# Patient Record
Sex: MALE | Race: WHITE | NOT HISPANIC OR LATINO | Employment: OTHER | URBAN - METROPOLITAN AREA
[De-identification: names, ages, dates, MRNs, and addresses within clinical notes are randomized per-mention and may not be internally consistent; named-entity substitution may affect disease eponyms.]

---

## 2017-02-24 ENCOUNTER — DOCUMENTATION ONLY (OUTPATIENT)
Dept: INTERNAL MEDICINE | Facility: CLINIC | Age: 72
End: 2017-02-24
Payer: MEDICARE

## 2017-02-24 DIAGNOSIS — I10 HYPERTENSION, BENIGN: ICD-10-CM

## 2017-02-24 DIAGNOSIS — E78.00 HIGH CHOLESTEROL: ICD-10-CM

## 2017-02-24 DIAGNOSIS — Z78.9 KNOWN MEDICAL PROBLEMS: Primary | ICD-10-CM

## 2017-02-24 PROCEDURE — 99490 CHRNC CARE MGMT STAFF 1ST 20: CPT | Mod: S$GLB,,, | Performed by: INTERNAL MEDICINE

## 2017-02-24 NOTE — PROGRESS NOTES
The patient's electronic chart was reviewed during this month. The patient's medical, functional, and psychosocial needs were assessed. Need for Home health care, PT, OT, , psychiatric care, and hospice was assessed. All preventative care measures were reviewed and updated. All medications were reviewed and reconciled. Potential drug interactions and medication adherence was reviewed. Prescriptions were renewed as appropriate. Education was provided to the patient and/or caregiver as needed, and all questions were answered. Over 20 minutes were spent providing these non-face-to-face services during this calendar month.    Refilled Simva.

## 2017-03-01 ENCOUNTER — DOCUMENTATION ONLY (OUTPATIENT)
Dept: INTERNAL MEDICINE | Facility: CLINIC | Age: 72
End: 2017-03-01
Payer: MEDICARE

## 2017-03-01 DIAGNOSIS — E87.6 LOW BLOOD POTASSIUM: ICD-10-CM

## 2017-03-01 DIAGNOSIS — E78.5 OTHER AND UNSPECIFIED HYPERLIPIDEMIA: Primary | ICD-10-CM

## 2017-03-01 DIAGNOSIS — Z78.9 KNOWN MEDICAL PROBLEMS: ICD-10-CM

## 2017-03-01 DIAGNOSIS — I10 UNSPECIFIED ESSENTIAL HYPERTENSION: ICD-10-CM

## 2017-03-01 DIAGNOSIS — E87.6 HYPOPOTASSEMIA: ICD-10-CM

## 2017-03-01 PROCEDURE — 99490 CHRNC CARE MGMT STAFF 1ST 20: CPT | Mod: S$GLB,,, | Performed by: INTERNAL MEDICINE

## 2017-03-01 RX ORDER — POTASSIUM CITRATE 10 MEQ/1
10 TABLET, EXTENDED RELEASE ORAL
Qty: 90 TABLET | Refills: 0 | Status: SHIPPED | OUTPATIENT
Start: 2017-03-01 | End: 2017-03-28 | Stop reason: SDUPTHER

## 2017-03-01 NOTE — PROGRESS NOTES
The patient's electronic chart was reviewed during this month. The patient's medical, functional, and psychosocial needs were assessed. Need for Home health care, PT, OT, , psychiatric care, and hospice was assessed. All preventative care measures were reviewed and updated. All medications were reviewed and reconciled. Potential drug interactions and medication adherence was reviewed. Prescriptions were renewed as appropriate. Education was provided to the patient and/or caregiver as needed, and all questions were answered. Over 20 minutes were spent providing these non-face-to-face services during this calendar month.     Refilled Potassium Citrate

## 2017-03-28 DIAGNOSIS — E87.6 HYPOPOTASSEMIA: ICD-10-CM

## 2017-03-28 RX ORDER — POTASSIUM CITRATE 10 MEQ/1
10 TABLET, EXTENDED RELEASE ORAL
Qty: 90 TABLET | Refills: 0 | Status: SHIPPED | OUTPATIENT
Start: 2017-03-28 | End: 2017-04-24 | Stop reason: SDUPTHER

## 2017-04-24 DIAGNOSIS — E87.6 HYPOPOTASSEMIA: ICD-10-CM

## 2017-04-24 RX ORDER — POTASSIUM CITRATE 10 MEQ/1
10 TABLET, EXTENDED RELEASE ORAL
Qty: 90 TABLET | Refills: 1 | Status: SHIPPED | OUTPATIENT
Start: 2017-04-24 | End: 2017-06-22 | Stop reason: SDUPTHER

## 2017-05-02 ENCOUNTER — OFFICE VISIT (OUTPATIENT)
Dept: INTERNAL MEDICINE | Facility: CLINIC | Age: 72
End: 2017-05-02
Attending: INTERNAL MEDICINE
Payer: MEDICARE

## 2017-05-02 VITALS
OXYGEN SATURATION: 97 % | HEART RATE: 77 BPM | WEIGHT: 269 LBS | DIASTOLIC BLOOD PRESSURE: 72 MMHG | BODY MASS INDEX: 35.65 KG/M2 | SYSTOLIC BLOOD PRESSURE: 130 MMHG | HEIGHT: 73 IN

## 2017-05-02 DIAGNOSIS — E78.00 HIGH CHOLESTEROL: ICD-10-CM

## 2017-05-02 DIAGNOSIS — E03.9 HYPOTHYROIDISM, UNSPECIFIED TYPE: ICD-10-CM

## 2017-05-02 DIAGNOSIS — E78.9 DISORDER OF LIPID METABOLISM: ICD-10-CM

## 2017-05-02 DIAGNOSIS — I10 HYPERTENSION, BENIGN: ICD-10-CM

## 2017-05-02 DIAGNOSIS — Z00.00 ROUTINE ADULT HEALTH MAINTENANCE: ICD-10-CM

## 2017-05-02 DIAGNOSIS — N20.1 URETERAL STONE: ICD-10-CM

## 2017-05-02 DIAGNOSIS — E55.9 VITAMIN D DEFICIENCY: ICD-10-CM

## 2017-05-02 DIAGNOSIS — M1A.09X0 IDIOPATHIC CHRONIC GOUT OF MULTIPLE SITES WITHOUT TOPHUS: Primary | ICD-10-CM

## 2017-05-02 DIAGNOSIS — R79.89 OTHER ABNORMAL BLOOD CHEMISTRY: ICD-10-CM

## 2017-05-02 DIAGNOSIS — I49.3 PVC (PREMATURE VENTRICULAR CONTRACTION): ICD-10-CM

## 2017-05-02 DIAGNOSIS — E11.9 TYPE 2 DIABETES MELLITUS WITHOUT COMPLICATION, WITHOUT LONG-TERM CURRENT USE OF INSULIN: Primary | ICD-10-CM

## 2017-05-02 DIAGNOSIS — D50.9 IRON DEFICIENCY ANEMIA, UNSPECIFIED IRON DEFICIENCY ANEMIA TYPE: ICD-10-CM

## 2017-05-02 DIAGNOSIS — Z12.5 SCREENING FOR PROSTATE CANCER: ICD-10-CM

## 2017-05-02 DIAGNOSIS — D51.0 PERNICIOUS ANEMIA: ICD-10-CM

## 2017-05-02 PROCEDURE — 1090F PRES/ABSN URINE INCON ASSESS: CPT | Mod: S$GLB,,, | Performed by: INTERNAL MEDICINE

## 2017-05-02 PROCEDURE — 3017F COLORECTAL CA SCREEN DOC REV: CPT | Mod: S$GLB,,, | Performed by: INTERNAL MEDICINE

## 2017-05-02 PROCEDURE — G0439 PPPS, SUBSEQ VISIT: HCPCS | Mod: S$GLB,,, | Performed by: INTERNAL MEDICINE

## 2017-05-02 PROCEDURE — G0444 DEPRESSION SCREEN ANNUAL: HCPCS | Mod: S$GLB,,, | Performed by: INTERNAL MEDICINE

## 2017-05-02 PROCEDURE — 3288F FALL RISK ASSESSMENT DOCD: CPT | Mod: S$GLB,,, | Performed by: INTERNAL MEDICINE

## 2017-05-02 PROCEDURE — 0521F PLAN OF CARE 4 PAIN DOCD: CPT | Mod: S$GLB,,, | Performed by: INTERNAL MEDICINE

## 2017-05-02 PROCEDURE — 3008F BODY MASS INDEX DOCD: CPT | Mod: S$GLB,,, | Performed by: INTERNAL MEDICINE

## 2017-05-02 PROCEDURE — 1170F FXNL STATUS ASSESSED: CPT | Mod: S$GLB,,, | Performed by: INTERNAL MEDICINE

## 2017-05-02 PROCEDURE — 1101F PT FALLS ASSESS-DOCD LE1/YR: CPT | Mod: S$GLB,,, | Performed by: INTERNAL MEDICINE

## 2017-05-02 PROCEDURE — G0447 BEHAVIOR COUNSEL OBESITY 15M: HCPCS | Mod: S$GLB,,, | Performed by: INTERNAL MEDICINE

## 2017-05-02 PROCEDURE — G0009 ADMIN PNEUMOCOCCAL VACCINE: HCPCS | Mod: S$GLB,,, | Performed by: INTERNAL MEDICINE

## 2017-05-02 PROCEDURE — 90670 PCV13 VACCINE IM: CPT | Mod: S$GLB,,, | Performed by: INTERNAL MEDICINE

## 2017-05-02 PROCEDURE — 3066F NEPHROPATHY DOC TX: CPT | Mod: S$GLB,,, | Performed by: INTERNAL MEDICINE

## 2017-05-02 PROCEDURE — G0442 ANNUAL ALCOHOL SCREEN 15 MIN: HCPCS | Mod: S$GLB,,, | Performed by: INTERNAL MEDICINE

## 2017-05-02 PROCEDURE — 3078F DIAST BP <80 MM HG: CPT | Mod: S$GLB,,, | Performed by: INTERNAL MEDICINE

## 2017-05-02 PROCEDURE — 3075F SYST BP GE 130 - 139MM HG: CPT | Mod: S$GLB,,, | Performed by: INTERNAL MEDICINE

## 2017-05-02 RX ORDER — COLCHICINE 0.6 MG/1
0.6 TABLET ORAL 2 TIMES DAILY PRN
Qty: 60 TABLET | Refills: 0 | Status: SHIPPED | OUTPATIENT
Start: 2017-05-02 | End: 2017-05-08 | Stop reason: SDUPTHER

## 2017-05-02 RX ORDER — INDOMETHACIN 25 MG/1
25 CAPSULE ORAL 2 TIMES DAILY PRN
Qty: 60 CAPSULE | Refills: 0 | Status: SHIPPED | OUTPATIENT
Start: 2017-05-02 | End: 2020-03-10

## 2017-05-02 NOTE — MR AVS SNAPSHOT
[facility]  [address] [facility], [facility] [address] [place] [id number]  Phone: [phone]  Fax: [phone]                  Av Dorantes   2017 8:45 AM   Office Visit    Description:  Male : 1945   Provider:  FORTINO Larry MD   Department:  [facility]           Reason for Visit     Follow-up     Gout           Diagnoses this Visit        Comments    Idiopathic chronic gout of multiple sites without tophus    -  Primary     High cholesterol         Hypertension, benign         PVC (premature ventricular contraction)         Ureteral stone         Routine adult health maintenance                To Do List           Future Appointments        Provider Department Dept Phone    2017 9:00 AM MD [facility]ia [phone]      Goals (5 Years of Data)     None      Follow-Up and Disposition     Return in about 6 months (around 2017).       These Medications        Disp Refills Start End    indomethacin (INDOCIN) 25 MG capsule 60 capsule 0 2017     Take 1 capsule (25 mg total) by mouth 2 (two) times daily as needed. - Oral    Pharmacy: [facility]/pharmacy #[id number] [address] Ph #: [phone]       colchicine (COLCRYS) 0.6 mg tablet 60 tablet 0 2017    Take 1 tablet (0.6 mg total) by mouth 2 (two) times daily as needed. - Oral    Pharmacy: [facility]/pharmacy #[id number] [address] Ph #: [phone]         [facility]     [facility] On [facility] Nurse Care Line -  Assistance  Unless otherwise directed by your provider, please contact [facility], our nurse care line that is available for  assistance.     Registered nurses in the [facility] provide: appointment scheduling, clinical advisement, health education, and other advisory services.  Call: [phone] (toll free)               Medications           START taking these NEW medications        Refills    indomethacin (INDOCIN) 25 MG capsule 0    Sig: Take 1  "capsule (25 mg total) by mouth 2 (two) times daily as needed.    Class: Normal    Route: Oral    colchicine (COLCRYS) 0.6 mg tablet 0    Sig: Take 1 tablet (0.6 mg total) by mouth 2 (two) times daily as needed.    Class: Normal    Route: Oral           Verify that the below list of medications is an accurate representation of the medications you are currently taking.  If none reported, the list may be blank. If incorrect, please contact your healthcare provider. Carry this list with you in case of emergency.           Current Medications     allopurinol (ZYLOPRIM) 300 MG tablet TAKE 1 TABLET DAILY    aspirin 162 MG TbEC Take 162 mg by mouth once daily.      cholecalciferol, vitamin D3, (VITAMIN D3) 1,000 unit capsule Take 1,000 Units by mouth once daily.    colchicine (COLCRYS) 0.6 mg tablet Take 1 tablet (0.6 mg total) by mouth 2 (two) times daily as needed.    indomethacin (INDOCIN) 25 MG capsule Take 1 capsule (25 mg total) by mouth 2 (two) times daily as needed.    irbesartan (AVAPRO) 300 MG tablet TAKE 1 TABLET DAILY    potassium citrate (UROCIT-K) 10 mEq (1,080 mg) TbSR Take 1 tablet (10 mEq total) by mouth 3 (three) times daily with meals.    simvastatin (ZOCOR) 80 MG tablet TAKE 1 TABLET DAILY           Clinical Reference Information           Your Vitals Were     BP Pulse Height Weight SpO2 BMI    130/72 77 6' 1" (1.854 m) 122 kg (269 lb) 97% 35.49 kg/m2      Blood Pressure          Most Recent Value    BP  130/72      Allergies as of 5/2/2017     No Known Allergies      Immunizations Administered on Date of Encounter - 5/2/2017     Name Date Dose VIS Date Route    Pneumococcal Conjugate - 13 Valent  Incomplete 0.5 mL 11/5/2015 Intramuscular      Orders Placed During Today's Visit      Normal Orders This Visit    Pneumococcal Conjugate Vaccine (13 Valent) (IM)       Instructions    Coordinated Plan of Care - 2017        You have been identified as having 2 or more chronic medical problems.  The goal of this " "plan is to minimize the impact of these medical problems on your health and lifespan.      Regaurdless of your medical problems there are 7 things you can do to likely make you live longer.  They are:  1. Do not smoke or quit smoking.   Quit smoking aids (gum, lozenges, and medications) are paid for by registering with the [place] [employer] at   Local - [phone]  Toll Free - [phone]  Web - service@smokingcessationtrust.org    2. Maintain a Body Mass Index < 27. BMI = Your weight in Pounds / (Your Height in Inches)2 × 703.  Examples: 5'0" < 138 lbs., 5'4" < 157 lbs, 5'8" < 177 lbs., 6' < 199 lbs.  (Add about 5 lbs. per additional inch).    3. Engage in regular physical activity.   Exercise aerobically with a target heart rate of (220-age) x 0.8  Exercise 30-45 minutes on most days of the week.  If you are out of shape you may have to start with 5-10 minutes per day and slowly increase your time.    4. Eat a healthy diet.  Fat intake: Not to exceed 30% of total daily calories.  Salt: 1.2 - 3 grams of Sodium each day.  (If you have high blood pressure it should be < 1.2 grams each day).  Supplements  All supplements can be obtained through a varied, healthy diet.   Calcium: 1,000 - 1,200 mg each day.  (1,500 mg each day if you have osteoporosis).   8 oz milk or Calcium fortified O.J. = 300 mg,  1oz of cheese = 100-200 mg  Vitamin D: 1,000 iu each day- Can probably be obtained by 30 min. of direct sunlight each day       3 oz. Kmibhw=242 iu,  3 oz. Tuna =150 iu, Milk or fortified O.J. = 120 iu  Fish oil: 1-2 grams each day or about 840 mg of EPA and DHA (Omega-3 fatty acids) each day       3 oz. Rancocas=2 grams,  3 oz. Tuna=1.3 grams,  3 oz. drained light Tuna= 0.25 grams    5. Maintain a normal cholesterol.  Your Bad cholesterol (LDL) should be < 130.  (LDL < 100 if you have heart artery disease, a stroke, vascular disease, or diabetes).    6. Maintain a normal Blood Pressure.  BP < 150/90 if over 60 " years old.  BP < 140/90 if age under 60, if you have diabetes, or kidney disease.    7. Maintain a normal Glucose (blood sugar).  Fasting Glucose < 100.  Hgb A1c < 6.0.  If you have diabetes a Hgb A1C < 7.0.  If you have diabetes and are older than 65 a Hgb A1C < 8.0.        Other Lifestyle guidelines  Alcohol: 1 drink = 12 oz. domestic beer, 4 oz. wine, or 1 oz. hard (80 proof) liquor             Males: </= 14 drinks per week with no more than 4 in any one day             Females: </= 7 drinks per week with no more than 3 in any one day    Depression: If you feel depressed discuss with your doctor.    Routine tests  You should be see by your Primary Care Doctor at least every 6 months.  Blood pressure check at each visit.  Cholesterol check every year.  Glucose check every year. Every 3-6 months if you have diabetes.  TSH (thyroid screen) every 2 years. Every 6-12 months if you take thyroid medicine.  Colonoscopy at age 50, and repeat every 10 years. until age 75.  Vision screen at age 65. Annual dilated retinal exam if you have diabetes.  Females - Mammogram every 1-2 years from age 40-75.                   Bone density scan at about age 65.  Males: Consider PSA screening annually at age 50, age 45 for  Americans, up to age 75.    Immunizations  Influenza vaccine every year in the fall.  Tetnus/Diptheria/Pertusis(Tdap) vaccine once, then Tetnus/Diptheria(Td) vaccine every 10 years.  Shingles vaccine at age 60.  Pneumonia vaccine at age 65. 2nd Pneumonia vaccine at least 1 year later (1st should be Prevnar-13 and 2nd should be Pneumovax-23).                 Language Assistance Services     ATTENTION: Language assistance services are available, free of charge. Please call [phone].      ATENCIÓN: Si habla chris, tiene a bolivar disposición servicios gratuitos de asistencia lingüística. Llame al [phone].     [facility] Ý: N?u b?n nói Ti?ng Vi?t, có các d?ch v? h? tr? ngôn ng? mi?n phí dành cho b?n. G?i s?  [phone].         [facility] complies with applicable Federal civil rights laws and does not discriminate on the basis of race, color, national origin, age, disability, or sex.

## 2017-05-02 NOTE — PATIENT INSTRUCTIONS
"Coordinated Plan of Care - 2017        You have been identified as having 2 or more chronic medical problems.  The goal of this plan is to minimize the impact of these medical problems on your health and lifespan.      Regaurdless of your medical problems there are 7 things you can do to likely make you live longer.  They are:  1. Do not smoke or quit smoking.   Quit smoking aids (gum, lozenges, and medications) are paid for by registering with the [place] [employer] at   [facility] - [phone]  Toll Free - [phone]  Web - service@smokingcessationtrust.org    2. Maintain a Body Mass Index < 27. BMI = Your weight in Pounds / (Your Height in Inches)2 × 703.  Examples: 5'0" < 138 lbs., 5'4" < 157 lbs, 5'8" < 177 lbs., 6' < 199 lbs.  (Add about 5 lbs. per additional inch).    3. Engage in regular physical activity.   Exercise aerobically with a target heart rate of (220-age) x 0.8  Exercise 30-45 minutes on most days of the week.  If you are out of shape you may have to start with 5-10 minutes per day and slowly increase your time.    4. Eat a healthy diet.  Fat intake: Not to exceed 30% of total daily calories.  Salt: 1.2 - 3 grams of Sodium each day.  (If you have high blood pressure it should be < 1.2 grams each day).  Supplements  All supplements can be obtained through a varied, healthy diet.   Calcium: 1,000 - 1,200 mg each day.  (1,500 mg each day if you have osteoporosis).   8 oz milk or Calcium fortified O.J. = 300 mg,  1oz of cheese = 100-200 mg  Vitamin D: 1,000 iu each day- Can probably be obtained by 30 min. of direct sunlight each day       3 oz. Nlzcop=046 iu,  3 oz. Tuna =150 iu, Milk or fortified O.J. = 120 iu  Fish oil: 1-2 grams each day or about 840 mg of EPA and DHA (Omega-3 fatty acids) each day       3 oz. Burns=2 grams,  3 oz. Tuna=1.3 grams,  3 oz. drained light Tuna= 0.25 grams    5. Maintain a normal cholesterol.  Your Bad cholesterol (LDL) should be < 130.  (LDL < 100 if you have " heart artery disease, a stroke, vascular disease, or diabetes).    6. Maintain a normal Blood Pressure.  BP < 150/90 if over 60 years old.  BP < 140/90 if age under 60, if you have diabetes, or kidney disease.    7. Maintain a normal Glucose (blood sugar).  Fasting Glucose < 100.  Hgb A1c < 6.0.  If you have diabetes a Hgb A1C < 7.0.  If you have diabetes and are older than 65 a Hgb A1C < 8.0.        Other Lifestyle guidelines  Alcohol: 1 drink = 12 oz. domestic beer, 4 oz. wine, or 1 oz. hard (80 proof) liquor             Males: </= 14 drinks per week with no more than 4 in any one day             Females: </= 7 drinks per week with no more than 3 in any one day    Depression: If you feel depressed discuss with your doctor.    Routine tests  You should be see by your Primary Care Doctor at least every 6 months.  Blood pressure check at each visit.  Cholesterol check every year.  Glucose check every year. Every 3-6 months if you have diabetes.  TSH (thyroid screen) every 2 years. Every 6-12 months if you take thyroid medicine.  Colonoscopy at age 50, and repeat every 10 years. until age 75.  Vision screen at age 65. Annual dilated retinal exam if you have diabetes.  Females - Mammogram every 1-2 years from age 40-75.                   Bone density scan at about age 65.  Males: Consider PSA screening annually at age 50, age 45 for  Americans, up to age 75.    Immunizations  Influenza vaccine every year in the fall.  Tetnus/Diptheria/Pertusis(Tdap) vaccine once, then Tetnus/Diptheria(Td) vaccine every 10 years.  Shingles vaccine at age 60.  Pneumonia vaccine at age 65. 2nd Pneumonia vaccine at least 1 year later (1st should be Prevnar-13 and 2nd should be Pneumovax-23).

## 2017-05-04 NOTE — PROGRESS NOTES
Subjective:       Patient ID: Av Dorantes is a 71 y.o. male.    Chief Complaint: Follow-up (6 month) and Gout (right foot)    HPI Comments: Lots of shellfish lately.  Arch of both feet hurt. Better with Colchicine and Indocin for a few days.    Annual Wellness Exam:    Cognitive Impairment: None    Mental Conditions: None    Depression Risk Factors: None    Functional Ability:    Steady gait: Yes  Self reliant: Yes   Safe home: Yes  Hearing Difficulties: No   Vision Difficulties: No    Physical Impairment: None    Living Will: See Patient Demographics    Functional assessment:  Able to do all ADL's  Fall Risk: Low  Urinary incontinence: No  Energy level: Good  Mental well-being: Good    HEIDS Measure screening dates:  BMI: 35.5      DEXA:               See Health Maintenance Report  colon screen:    See Health Maintenance Report      Mammogram:   See Health Maintenance Report                   Glaucoma screen:  per Optho                    Vaccines: See Health Maintenance Report  Flu:            Pneumovax:  Zostavax:       Pain management: Coch and Indocin prn gout pain.      The patient's current health status is: Good   Patient was educated on all medical problems. See A/P.                             Hypertension   This is a chronic problem. The current episode started more than 1 year ago. The problem is controlled.     Review of Systems   Constitutional: Negative.    Respiratory: Negative.    Cardiovascular: Negative.    Musculoskeletal: Positive for arthralgias.   Psychiatric/Behavioral: Negative for dysphoric mood.       Objective:      Physical Exam   Constitutional: He appears well-developed and well-nourished.   HENT:   Head: Normocephalic and atraumatic.   Eyes: Pupils are equal, round, and reactive to light.   Cardiovascular: Normal rate, regular rhythm and normal heart sounds.    Pulmonary/Chest: Effort normal.   Musculoskeletal: He exhibits edema.   1+ edema bilateral.   Neurological: He is alert.        Assessment:       1. Idiopathic chronic gout of multiple sites without tophus    2. High cholesterol    3. Hypertension, benign    4. PVC (premature ventricular contraction)    5. Ureteral stone    6. Routine adult health maintenance        Plan:       Per orders and D/C instructions.  Continue meds/diet for gout, Hx kidney stone, Hx PVCs, HTN, and high cholest. which are stable.  Low purine diet and cont Allopurinol for gout.    Screening: The patient was screened for depression with the PHQ2 questionnaire and possible health consequences were discussed with the patient, who understands (15 minutes spent). The patient was screened for the misuse of alcohol, by asking the number of drinks per average week, and if pt has had more than 4 drinks (more than 3 for women and elderly) in 1 day within the past year. The health and legal consequences of misuse were discussed (15 minutes spent). The patient was screened for obesity (BMI>30), If the current BMI > 30, then the possible consequences of obesity, as well as the benefits of diet, exercise, and weight loss were discussed (15 minutes spent).

## 2017-05-05 LAB
25(OH)D3+25(OH)D2 SERPL-MCNC: 36 NG/ML (ref 30–100)
ALBUMIN SERPL-MCNC: 4.5 G/DL (ref 3.6–5.1)
ALBUMIN/GLOB SERPL: 1.8 (CALC) (ref 1–2.5)
ALP SERPL-CCNC: 46 U/L (ref 40–115)
ALT SERPL-CCNC: 27 U/L (ref 9–46)
AST SERPL-CCNC: 22 U/L (ref 10–35)
BASOPHILS # BLD AUTO: 20 CELLS/UL (ref 0–200)
BASOPHILS NFR BLD AUTO: 0.4 %
BILIRUB SERPL-MCNC: 0.6 MG/DL (ref 0.2–1.2)
BUN SERPL-MCNC: 16 MG/DL (ref 7–25)
BUN/CREAT SERPL: ABNORMAL (CALC) (ref 6–22)
CALCIUM SERPL-MCNC: 9.8 MG/DL (ref 8.6–10.3)
CHLORIDE SERPL-SCNC: 104 MMOL/L (ref 98–110)
CHOLEST SERPL-MCNC: 195 MG/DL (ref 125–200)
CHOLEST/HDLC SERPL: 3.9 (CALC)
CK SERPL-CCNC: 267 U/L (ref 44–196)
CO2 SERPL-SCNC: 25 MMOL/L (ref 20–31)
CREAT SERPL-MCNC: 1 MG/DL (ref 0.7–1.18)
EOSINOPHIL # BLD AUTO: 87 CELLS/UL (ref 15–500)
EOSINOPHIL NFR BLD AUTO: 1.7 %
ERYTHROCYTE [DISTWIDTH] IN BLOOD BY AUTOMATED COUNT: 12.9 % (ref 11–15)
GFR SERPL CREATININE-BSD FRML MDRD: 74 ML/MIN/1.73M2
GLOBULIN SER CALC-MCNC: 2.5 G/DL (CALC) (ref 1.9–3.7)
GLUCOSE SERPL-MCNC: 101 MG/DL (ref 65–99)
HBA1C MFR BLD: 5.7 % OF TOTAL HGB
HCT VFR BLD AUTO: 42.6 % (ref 38.5–50)
HDLC SERPL-MCNC: 50 MG/DL
HGB BLD-MCNC: 14.5 G/DL (ref 13.2–17.1)
LDLC SERPL CALC-MCNC: 114 MG/DL (CALC)
LYMPHOCYTES # BLD AUTO: 1346 CELLS/UL (ref 850–3900)
LYMPHOCYTES NFR BLD AUTO: 26.4 %
MCH RBC QN AUTO: 32.8 PG (ref 27–33)
MCHC RBC AUTO-ENTMCNC: 34.1 G/DL (ref 32–36)
MCV RBC AUTO: 96.1 FL (ref 80–100)
MONOCYTES # BLD AUTO: 464 CELLS/UL (ref 200–950)
MONOCYTES NFR BLD AUTO: 9.1 %
NEUTROPHILS # BLD AUTO: 3182 CELLS/UL (ref 1500–7800)
NEUTROPHILS NFR BLD AUTO: 62.4 %
NONHDLC SERPL-MCNC: 145 MG/DL (CALC)
PLATELET # BLD AUTO: 150 THOUSAND/UL (ref 140–400)
PMV BLD REES-ECKER: 9 FL (ref 7.5–12.5)
POTASSIUM SERPL-SCNC: 4.5 MMOL/L (ref 3.5–5.3)
PROT SERPL-MCNC: 7 G/DL (ref 6.1–8.1)
PSA SERPL-MCNC: 1.2 NG/ML
RBC # BLD AUTO: 4.44 MILLION/UL (ref 4.2–5.8)
SODIUM SERPL-SCNC: 138 MMOL/L (ref 135–146)
TRIGL SERPL-MCNC: 153 MG/DL
TSH SERPL-ACNC: 2.48 MIU/L (ref 0.4–4.5)
URATE SERPL-MCNC: 4.5 MG/DL (ref 4–8)
VIT B12 SERPL-MCNC: 413 PG/ML (ref 200–1100)
VITAMIN D2 SERPL-MCNC: <4 NG/ML
VITAMIN D3 SERPL-MCNC: 36 NG/ML
WBC # BLD AUTO: 5.1 THOUSAND/UL (ref 3.8–10.8)

## 2017-05-08 ENCOUNTER — DOCUMENTATION ONLY (OUTPATIENT)
Dept: INTERNAL MEDICINE | Facility: CLINIC | Age: 72
End: 2017-05-08
Payer: MEDICARE

## 2017-05-08 DIAGNOSIS — I10 HYPERTENSION, BENIGN: Primary | ICD-10-CM

## 2017-05-08 DIAGNOSIS — E78.00 HIGH CHOLESTEROL: ICD-10-CM

## 2017-05-08 DIAGNOSIS — M10.9 GOUT, UNSPECIFIED CAUSE, UNSPECIFIED CHRONICITY, UNSPECIFIED SITE: Primary | ICD-10-CM

## 2017-05-08 DIAGNOSIS — Z78.9 KNOWN MEDICAL PROBLEMS: ICD-10-CM

## 2017-05-08 PROCEDURE — 99490 CHRNC CARE MGMT STAFF 1ST 20: CPT | Mod: S$GLB,,, | Performed by: INTERNAL MEDICINE

## 2017-05-08 RX ORDER — COLCHICINE 0.6 MG/1
0.6 TABLET ORAL 2 TIMES DAILY PRN
Qty: 60 TABLET | Refills: 2 | Status: SHIPPED | OUTPATIENT
Start: 2017-05-08 | End: 2018-05-18 | Stop reason: SDUPTHER

## 2017-05-08 RX ORDER — FEBUXOSTAT 80 MG/1
TABLET, FILM COATED ORAL
Qty: 30 TABLET | Refills: 2 | Status: SHIPPED | OUTPATIENT
Start: 2017-05-08 | End: 2017-08-11 | Stop reason: SDUPTHER

## 2017-05-19 NOTE — PROGRESS NOTES
Called in meds for gout flare up            The patient's electronic chart was reviewed during this month. The patient's medical, functional, and psychosocial needs were assessed. Need for Home health care, PT, OT, , psychiatric care, and hospice was assessed. All preventative care measures were reviewed and updated. All medications were reviewed and reconciled. Potential drug interactions and medication adherence was reviewed. Prescriptions were renewed as appropriate. Education was provided to the patient and/or caregiver. All questions were answered. Over 20 minutes were spent providing these non-face-to-face services during this calendar month.

## 2017-06-22 ENCOUNTER — DOCUMENTATION ONLY (OUTPATIENT)
Dept: INTERNAL MEDICINE | Facility: CLINIC | Age: 72
End: 2017-06-22
Payer: MEDICARE

## 2017-06-22 DIAGNOSIS — I10 HYPERTENSION, BENIGN: ICD-10-CM

## 2017-06-22 DIAGNOSIS — Z78.9 KNOWN MEDICAL PROBLEMS: ICD-10-CM

## 2017-06-22 DIAGNOSIS — E78.00 HIGH CHOLESTEROL: Primary | ICD-10-CM

## 2017-06-22 DIAGNOSIS — E87.6 HYPOPOTASSEMIA: ICD-10-CM

## 2017-06-22 PROCEDURE — 99490 CHRNC CARE MGMT STAFF 1ST 20: CPT | Mod: S$GLB,,, | Performed by: INTERNAL MEDICINE

## 2017-06-22 RX ORDER — POTASSIUM CITRATE 10 MEQ/1
10 TABLET, EXTENDED RELEASE ORAL
Qty: 90 TABLET | Refills: 1 | Status: SHIPPED | OUTPATIENT
Start: 2017-06-22 | End: 2017-12-04 | Stop reason: SDUPTHER

## 2017-06-22 NOTE — PROGRESS NOTES
Refilled potassium citrate and sent to [facility] on [address].          The patient's electronic chart was reviewed during this month. The patient's medical, functional, and psychosocial needs were assessed. Need for Home health care, PT, OT, , psychiatric care, and hospice was assessed. All preventative care measures were reviewed and updated. All medications were reviewed and reconciled. Potential drug interactions and medication adherence was reviewed. Prescriptions were renewed as appropriate. Education was provided to the patient and/or caregiver. All questions were answered. Over 20 minutes were spent providing these non-face-to-face services during this calendar month.

## 2017-06-30 DIAGNOSIS — I10 ESSENTIAL HYPERTENSION: Primary | ICD-10-CM

## 2017-06-30 RX ORDER — IRBESARTAN 300 MG/1
300 TABLET ORAL DAILY
Qty: 90 TABLET | Refills: 1 | Status: SHIPPED | OUTPATIENT
Start: 2017-06-30 | End: 2017-12-29 | Stop reason: SDUPTHER

## 2017-08-11 DIAGNOSIS — M10.9 GOUT, UNSPECIFIED CAUSE, UNSPECIFIED CHRONICITY, UNSPECIFIED SITE: ICD-10-CM

## 2017-08-11 RX ORDER — FEBUXOSTAT 80 MG/1
TABLET, FILM COATED ORAL
Qty: 30 TABLET | Refills: 2 | Status: SHIPPED | OUTPATIENT
Start: 2017-08-11 | End: 2017-11-06 | Stop reason: SDUPTHER

## 2017-09-11 DIAGNOSIS — Z29.89 ALTITUDE SICKNESS PREVENTATIVE MEASURES: Primary | ICD-10-CM

## 2017-09-11 RX ORDER — ACETAZOLAMIDE 250 MG/1
250 TABLET ORAL 2 TIMES DAILY
Qty: 30 TABLET | Refills: 0 | Status: SHIPPED | OUTPATIENT
Start: 2017-09-11 | End: 2017-12-05

## 2017-09-12 DIAGNOSIS — E78.00 HIGH CHOLESTEROL: Primary | ICD-10-CM

## 2017-09-12 RX ORDER — SIMVASTATIN 80 MG/1
80 TABLET, FILM COATED ORAL DAILY
Qty: 90 TABLET | Refills: 1 | Status: SHIPPED | OUTPATIENT
Start: 2017-09-12 | End: 2018-03-13 | Stop reason: SDUPTHER

## 2017-11-06 ENCOUNTER — DOCUMENTATION ONLY (OUTPATIENT)
Dept: INTERNAL MEDICINE | Facility: CLINIC | Age: 72
End: 2017-11-06
Payer: MEDICARE

## 2017-11-06 DIAGNOSIS — Z78.9 KNOWN MEDICAL PROBLEMS: ICD-10-CM

## 2017-11-06 DIAGNOSIS — I10 ESSENTIAL HYPERTENSION, BENIGN: Primary | ICD-10-CM

## 2017-11-06 DIAGNOSIS — E78.5 HYPERLIPIDEMIA, UNSPECIFIED HYPERLIPIDEMIA TYPE: ICD-10-CM

## 2017-11-06 DIAGNOSIS — M10.9 GOUT, UNSPECIFIED CAUSE, UNSPECIFIED CHRONICITY, UNSPECIFIED SITE: ICD-10-CM

## 2017-11-06 PROCEDURE — 99490 CHRNC CARE MGMT STAFF 1ST 20: CPT | Mod: S$GLB,,, | Performed by: INTERNAL MEDICINE

## 2017-11-06 RX ORDER — FEBUXOSTAT 80 MG/1
TABLET, FILM COATED ORAL
Qty: 30 TABLET | Refills: 2 | Status: SHIPPED | OUTPATIENT
Start: 2017-11-06 | End: 2018-03-01 | Stop reason: SDUPTHER

## 2017-11-06 NOTE — PROGRESS NOTES
The patient's electronic chart was reviewed during this month. The patient's medical, functional, and psychosocial needs were assessed. Need for Home health care, PT, OT, , psychiatric care, and hospice was assessed. All preventative care measures were reviewed and updated. All medications were reviewed and reconciled. Potential drug interactions and medication adherence was reviewed. Prescriptions were renewed as appropriate. Education was provided to the patient and/or caregiver as needed, and all questions were answered. Over 20 minutes were spent providing these non-face-to-face services during this calendar month.     Refilled Uloric  Updated LINKS registry

## 2017-12-04 ENCOUNTER — DOCUMENTATION ONLY (OUTPATIENT)
Dept: INTERNAL MEDICINE | Facility: CLINIC | Age: 72
End: 2017-12-04
Payer: MEDICARE

## 2017-12-04 DIAGNOSIS — E87.6 HYPOPOTASSEMIA: ICD-10-CM

## 2017-12-04 DIAGNOSIS — E78.00 HIGH CHOLESTEROL: Primary | ICD-10-CM

## 2017-12-04 DIAGNOSIS — I10 HYPERTENSION, BENIGN: ICD-10-CM

## 2017-12-04 DIAGNOSIS — Z78.9 KNOWN MEDICAL PROBLEMS: ICD-10-CM

## 2017-12-04 PROCEDURE — 99490 CHRNC CARE MGMT STAFF 1ST 20: CPT | Mod: S$GLB,,, | Performed by: INTERNAL MEDICINE

## 2017-12-04 RX ORDER — POTASSIUM CITRATE 10 MEQ/1
10 TABLET, EXTENDED RELEASE ORAL
Qty: 90 TABLET | Refills: 1 | Status: SHIPPED | OUTPATIENT
Start: 2017-12-04 | End: 2018-01-26 | Stop reason: SDUPTHER

## 2017-12-05 ENCOUNTER — OFFICE VISIT (OUTPATIENT)
Dept: INTERNAL MEDICINE | Facility: CLINIC | Age: 72
End: 2017-12-05
Attending: INTERNAL MEDICINE
Payer: MEDICARE

## 2017-12-05 VITALS
DIASTOLIC BLOOD PRESSURE: 78 MMHG | SYSTOLIC BLOOD PRESSURE: 120 MMHG | HEART RATE: 78 BPM | HEIGHT: 73 IN | WEIGHT: 266 LBS | OXYGEN SATURATION: 94 % | BODY MASS INDEX: 35.25 KG/M2

## 2017-12-05 DIAGNOSIS — M1A.09X0 IDIOPATHIC CHRONIC GOUT OF MULTIPLE SITES WITHOUT TOPHUS: ICD-10-CM

## 2017-12-05 DIAGNOSIS — K63.5 POLYP OF COLON, UNSPECIFIED PART OF COLON, UNSPECIFIED TYPE: ICD-10-CM

## 2017-12-05 DIAGNOSIS — I10 HYPERTENSION, BENIGN: ICD-10-CM

## 2017-12-05 DIAGNOSIS — E78.00 HIGH CHOLESTEROL: Primary | ICD-10-CM

## 2017-12-05 PROCEDURE — 90662 IIV NO PRSV INCREASED AG IM: CPT | Mod: S$GLB,,, | Performed by: INTERNAL MEDICINE

## 2017-12-05 PROCEDURE — G0008 ADMIN INFLUENZA VIRUS VAC: HCPCS | Mod: S$GLB,,, | Performed by: INTERNAL MEDICINE

## 2017-12-05 PROCEDURE — 99213 OFFICE O/P EST LOW 20 MIN: CPT | Mod: S$GLB,,, | Performed by: INTERNAL MEDICINE

## 2017-12-05 NOTE — PROGRESS NOTES
Subjective:       Patient ID: Av Dorantes is a 71 y.o. male.    Chief Complaint: Follow-up (6 month)    Hypertension   This is a chronic problem. The current episode started more than 1 year ago. The problem is controlled.     Review of Systems   Constitutional: Negative.    Respiratory: Negative.    Cardiovascular: Negative.        Objective:      Physical Exam   Constitutional: He appears well-developed and well-nourished.   HENT:   Head: Normocephalic and atraumatic.   Eyes: Pupils are equal, round, and reactive to light.   Cardiovascular: Normal rate, regular rhythm and normal heart sounds.    Pulmonary/Chest: Effort normal.   Musculoskeletal: He exhibits edema.   1+ edema bilateral.   Neurological: He is alert.       Assessment:       1. High cholesterol    2. Hypertension, benign    3. Polyp of colon, unspecified part of colon, unspecified type    4. Idiopathic chronic gout of multiple sites without tophus        Plan:       Per orders and D/C instructions.  Continue meds/diet for gout, HTN, and high cholest. which are stable.

## 2017-12-27 NOTE — PROGRESS NOTES
Sent script to pharmacy.          The patient's electronic chart was reviewed during this month. The patient's medical, functional, and psychosocial needs were assessed. Need for Home health care, PT, OT, , psychiatric care, and hospice was assessed. All preventative care measures were reviewed and updated. All medications were reviewed and reconciled. Potential drug interactions and medication adherence was reviewed. Prescriptions were renewed as appropriate. Education was provided to the patient and/or caregiver. All questions were answered. Over 20 minutes were spent providing these non-face-to-face services during this calendar month.

## 2017-12-29 DIAGNOSIS — I10 ESSENTIAL HYPERTENSION: ICD-10-CM

## 2017-12-29 RX ORDER — IRBESARTAN 300 MG/1
300 TABLET ORAL DAILY
Qty: 90 TABLET | Refills: 1 | Status: SHIPPED | OUTPATIENT
Start: 2017-12-29 | End: 2018-06-25 | Stop reason: SDUPTHER

## 2018-01-26 ENCOUNTER — DOCUMENTATION ONLY (OUTPATIENT)
Dept: INTERNAL MEDICINE | Facility: CLINIC | Age: 73
End: 2018-01-26
Payer: MEDICARE

## 2018-01-26 DIAGNOSIS — E78.00 HIGH CHOLESTEROL: Primary | ICD-10-CM

## 2018-01-26 DIAGNOSIS — I10 HYPERTENSION, BENIGN: ICD-10-CM

## 2018-01-26 DIAGNOSIS — Z78.9 KNOWN MEDICAL PROBLEMS: ICD-10-CM

## 2018-01-26 DIAGNOSIS — E87.6 HYPOPOTASSEMIA: ICD-10-CM

## 2018-01-26 PROCEDURE — 99490 CHRNC CARE MGMT STAFF 1ST 20: CPT | Mod: S$GLB,,, | Performed by: INTERNAL MEDICINE

## 2018-01-26 RX ORDER — POTASSIUM CITRATE 10 MEQ/1
10 TABLET, EXTENDED RELEASE ORAL
Qty: 90 TABLET | Refills: 1 | Status: SHIPPED | OUTPATIENT
Start: 2018-01-26 | End: 2018-04-02 | Stop reason: SDUPTHER

## 2018-01-26 NOTE — PROGRESS NOTES
The patient's electronic chart was reviewed during this month. The patient's medical, functional, and psychosocial needs were assessed. Need for Home health care, PT, OT, , psychiatric care, and hospice was assessed. All preventative care measures were reviewed and updated. All medications were reviewed and reconciled. Potential drug interactions and medication adherence was reviewed. Prescriptions were renewed as appropriate. Education was provided to the patient and/or caregiver as needed, and all questions were answered. Over 20 minutes were spent providing these non-face-to-face services during this calendar month.    Refilled Urocrit.

## 2018-03-01 RX ORDER — FEBUXOSTAT 80 MG/1
80 TABLET, FILM COATED ORAL DAILY
Qty: 90 TABLET | Refills: 0 | Status: SHIPPED | OUTPATIENT
Start: 2018-03-01 | End: 2018-07-24 | Stop reason: SDUPTHER

## 2018-03-12 ENCOUNTER — DOCUMENTATION ONLY (OUTPATIENT)
Dept: INTERNAL MEDICINE | Facility: CLINIC | Age: 73
End: 2018-03-12
Payer: MEDICARE

## 2018-03-12 DIAGNOSIS — E78.5 HYPERLIPIDEMIA, UNSPECIFIED HYPERLIPIDEMIA TYPE: Primary | ICD-10-CM

## 2018-03-12 DIAGNOSIS — Z78.9 KNOWN MEDICAL PROBLEMS: ICD-10-CM

## 2018-03-12 DIAGNOSIS — I10 ESSENTIAL HYPERTENSION, MALIGNANT: ICD-10-CM

## 2018-03-12 PROCEDURE — 99490 CHRNC CARE MGMT STAFF 1ST 20: CPT | Mod: S$GLB,,, | Performed by: INTERNAL MEDICINE

## 2018-03-13 RX ORDER — SIMVASTATIN 80 MG/1
80 TABLET, FILM COATED ORAL NIGHTLY
Qty: 90 TABLET | Refills: 0 | Status: SHIPPED | OUTPATIENT
Start: 2018-03-13 | End: 2018-06-11 | Stop reason: SDUPTHER

## 2018-04-02 ENCOUNTER — DOCUMENTATION ONLY (OUTPATIENT)
Dept: INTERNAL MEDICINE | Facility: CLINIC | Age: 73
End: 2018-04-02
Payer: MEDICARE

## 2018-04-02 DIAGNOSIS — E78.00 PURE HYPERCHOLESTEROLEMIA: Primary | ICD-10-CM

## 2018-04-02 DIAGNOSIS — I10 ESSENTIAL HYPERTENSION, MALIGNANT: ICD-10-CM

## 2018-04-02 DIAGNOSIS — Z78.9 KNOWN MEDICAL PROBLEMS: ICD-10-CM

## 2018-04-02 DIAGNOSIS — E87.6 HYPOPOTASSEMIA: ICD-10-CM

## 2018-04-02 PROCEDURE — 99490 CHRNC CARE MGMT STAFF 1ST 20: CPT | Mod: S$GLB,,, | Performed by: INTERNAL MEDICINE

## 2018-04-02 RX ORDER — POTASSIUM CITRATE 10 MEQ/1
10 TABLET, EXTENDED RELEASE ORAL
Qty: 90 TABLET | Refills: 1 | Status: SHIPPED | OUTPATIENT
Start: 2018-04-02 | End: 2018-05-07 | Stop reason: SDUPTHER

## 2018-04-30 NOTE — PROGRESS NOTES
The patient's electronic chart was reviewed during this month. The patient's medical, functional, and psychosocial needs were assessed. Need for Home health care, PT, OT, , psychiatric care, and hospice was assessed. All preventative care measures were reviewed and updated. All medications were reviewed and reconciled. Potential drug interactions and medication adherence was reviewed. Prescriptions were renewed as appropriate. Education was provided to the patient and/or caregiver as needed, and all questions were answered. Over 20 minutes were spent providing these non-face-to-face services during this calendar month.     Refilled Urocit  Received Colonoscopy report, reviewed and updated health maintenance.

## 2018-05-07 DIAGNOSIS — E87.6 HYPOPOTASSEMIA: ICD-10-CM

## 2018-05-07 RX ORDER — POTASSIUM CITRATE 10 MEQ/1
10 TABLET, EXTENDED RELEASE ORAL
Qty: 270 TABLET | Refills: 0 | Status: SHIPPED | OUTPATIENT
Start: 2018-05-07 | End: 2018-12-21 | Stop reason: SDUPTHER

## 2018-05-18 DIAGNOSIS — M10.9 GOUT, UNSPECIFIED CAUSE, UNSPECIFIED CHRONICITY, UNSPECIFIED SITE: ICD-10-CM

## 2018-05-18 RX ORDER — COLCHICINE 0.6 MG/1
0.6 TABLET ORAL 2 TIMES DAILY PRN
Qty: 60 TABLET | Refills: 2 | Status: SHIPPED | OUTPATIENT
Start: 2018-05-18 | End: 2018-07-24 | Stop reason: SDUPTHER

## 2018-06-11 RX ORDER — SIMVASTATIN 80 MG/1
80 TABLET, FILM COATED ORAL NIGHTLY
Qty: 90 TABLET | Refills: 0 | Status: SHIPPED | OUTPATIENT
Start: 2018-06-11 | End: 2018-10-05 | Stop reason: SDUPTHER

## 2018-06-25 DIAGNOSIS — I10 ESSENTIAL HYPERTENSION: ICD-10-CM

## 2018-06-25 RX ORDER — IRBESARTAN 300 MG/1
300 TABLET ORAL DAILY
Qty: 90 TABLET | Refills: 1 | Status: SHIPPED | OUTPATIENT
Start: 2018-06-25 | End: 2018-12-31 | Stop reason: SDUPTHER

## 2018-07-24 DIAGNOSIS — M10.9 GOUT, UNSPECIFIED CAUSE, UNSPECIFIED CHRONICITY, UNSPECIFIED SITE: ICD-10-CM

## 2018-07-24 RX ORDER — COLCHICINE 0.6 MG/1
0.6 TABLET ORAL 2 TIMES DAILY PRN
Qty: 60 TABLET | Refills: 2 | Status: SHIPPED | OUTPATIENT
Start: 2018-07-24 | End: 2018-09-04

## 2018-07-24 RX ORDER — FEBUXOSTAT 80 MG/1
80 TABLET, FILM COATED ORAL DAILY
Qty: 90 TABLET | Refills: 1 | Status: SHIPPED | OUTPATIENT
Start: 2018-07-24 | End: 2019-01-11 | Stop reason: SDUPTHER

## 2018-08-03 DIAGNOSIS — J32.9 SINUSITIS, UNSPECIFIED CHRONICITY, UNSPECIFIED LOCATION: Primary | ICD-10-CM

## 2018-08-03 RX ORDER — METHYLPREDNISOLONE 4 MG/1
TABLET ORAL
Qty: 1 PACKAGE | Refills: 0 | Status: SHIPPED | OUTPATIENT
Start: 2018-08-03 | End: 2018-09-04

## 2018-08-03 RX ORDER — DOXYCYCLINE HYCLATE 100 MG
100 TABLET ORAL 2 TIMES DAILY
Qty: 14 TABLET | Refills: 0 | Status: SHIPPED | OUTPATIENT
Start: 2018-08-03 | End: 2018-09-04

## 2018-09-04 ENCOUNTER — LAB VISIT (OUTPATIENT)
Dept: LAB | Facility: OTHER | Age: 73
End: 2018-09-04
Attending: INTERNAL MEDICINE
Payer: MEDICARE

## 2018-09-04 ENCOUNTER — OFFICE VISIT (OUTPATIENT)
Dept: INTERNAL MEDICINE | Facility: CLINIC | Age: 73
End: 2018-09-04
Attending: INTERNAL MEDICINE
Payer: MEDICARE

## 2018-09-04 VITALS
SYSTOLIC BLOOD PRESSURE: 118 MMHG | HEART RATE: 71 BPM | BODY MASS INDEX: 34.85 KG/M2 | DIASTOLIC BLOOD PRESSURE: 76 MMHG | HEIGHT: 73 IN | WEIGHT: 263 LBS | OXYGEN SATURATION: 97 %

## 2018-09-04 DIAGNOSIS — E55.9 VITAMIN D DEFICIENCY: ICD-10-CM

## 2018-09-04 DIAGNOSIS — Z00.00 ROUTINE ADULT HEALTH MAINTENANCE: ICD-10-CM

## 2018-09-04 DIAGNOSIS — R79.89 OTHER SPECIFIED ABNORMAL FINDINGS OF BLOOD CHEMISTRY: ICD-10-CM

## 2018-09-04 DIAGNOSIS — Z12.5 SCREENING FOR PROSTATE CANCER: ICD-10-CM

## 2018-09-04 DIAGNOSIS — Z13.89 SCREENING FOR OBESITY: ICD-10-CM

## 2018-09-04 DIAGNOSIS — D50.8 OTHER IRON DEFICIENCY ANEMIA: ICD-10-CM

## 2018-09-04 DIAGNOSIS — N20.1 URETERAL STONE: ICD-10-CM

## 2018-09-04 DIAGNOSIS — I10 ESSENTIAL HYPERTENSION: ICD-10-CM

## 2018-09-04 DIAGNOSIS — E78.9 DISORDER OF LIPID METABOLISM: ICD-10-CM

## 2018-09-04 DIAGNOSIS — D51.0 PERNICIOUS ANEMIA: ICD-10-CM

## 2018-09-04 DIAGNOSIS — E78.00 HIGH CHOLESTEROL: Primary | ICD-10-CM

## 2018-09-04 DIAGNOSIS — E66.9 OBESITY (BMI 30.0-34.9): ICD-10-CM

## 2018-09-04 DIAGNOSIS — Z13.39 SCREENING FOR ALCOHOLISM: ICD-10-CM

## 2018-09-04 DIAGNOSIS — M1A.09X0 IDIOPATHIC CHRONIC GOUT OF MULTIPLE SITES WITHOUT TOPHUS: ICD-10-CM

## 2018-09-04 DIAGNOSIS — E03.9 HYPOTHYROIDISM, UNSPECIFIED TYPE: ICD-10-CM

## 2018-09-04 DIAGNOSIS — Z13.31 SCREENING FOR DEPRESSION: ICD-10-CM

## 2018-09-04 PROBLEM — E66.811 OBESITY (BMI 30.0-34.9): Status: ACTIVE | Noted: 2018-09-04

## 2018-09-04 LAB
25(OH)D3+25(OH)D2 SERPL-MCNC: 39 NG/ML
ALBUMIN SERPL BCP-MCNC: 4.1 G/DL
ALP SERPL-CCNC: 59 U/L
ALT SERPL W/O P-5'-P-CCNC: 39 U/L
ANION GAP SERPL CALC-SCNC: 11 MMOL/L
AST SERPL-CCNC: 28 U/L
BASOPHILS # BLD AUTO: 0.03 K/UL
BASOPHILS NFR BLD: 0.6 %
BILIRUB SERPL-MCNC: 1.1 MG/DL
BUN SERPL-MCNC: 16 MG/DL
CALCIUM SERPL-MCNC: 9.8 MG/DL
CHLORIDE SERPL-SCNC: 104 MMOL/L
CHOLEST SERPL-MCNC: 196 MG/DL
CHOLEST/HDLC SERPL: 3.8 {RATIO}
CK SERPL-CCNC: 261 U/L
CO2 SERPL-SCNC: 25 MMOL/L
COMPLEXED PSA SERPL-MCNC: 1.5 NG/ML
CREAT SERPL-MCNC: 1 MG/DL
DIFFERENTIAL METHOD: ABNORMAL
EOSINOPHIL # BLD AUTO: 0.2 K/UL
EOSINOPHIL NFR BLD: 3.3 %
ERYTHROCYTE [DISTWIDTH] IN BLOOD BY AUTOMATED COUNT: 12.1 %
EST. GFR  (AFRICAN AMERICAN): >60 ML/MIN/1.73 M^2
EST. GFR  (NON AFRICAN AMERICAN): >60 ML/MIN/1.73 M^2
ESTIMATED AVG GLUCOSE: 111 MG/DL
GLUCOSE SERPL-MCNC: 99 MG/DL
HBA1C MFR BLD HPLC: 5.5 %
HCT VFR BLD AUTO: 44 %
HDLC SERPL-MCNC: 52 MG/DL
HDLC SERPL: 26.5 %
HGB BLD-MCNC: 14.4 G/DL
LDLC SERPL CALC-MCNC: 122.2 MG/DL
LYMPHOCYTES # BLD AUTO: 1.4 K/UL
LYMPHOCYTES NFR BLD: 29 %
MCH RBC QN AUTO: 32.7 PG
MCHC RBC AUTO-ENTMCNC: 32.7 G/DL
MCV RBC AUTO: 100 FL
MONOCYTES # BLD AUTO: 0.6 K/UL
MONOCYTES NFR BLD: 11.2 %
NEUTROPHILS # BLD AUTO: 2.7 K/UL
NEUTROPHILS NFR BLD: 55.5 %
NONHDLC SERPL-MCNC: 144 MG/DL
PLATELET # BLD AUTO: 160 K/UL
PMV BLD AUTO: 10.5 FL
POTASSIUM SERPL-SCNC: 4.6 MMOL/L
PROT SERPL-MCNC: 7.5 G/DL
RBC # BLD AUTO: 4.41 M/UL
SODIUM SERPL-SCNC: 140 MMOL/L
TRIGL SERPL-MCNC: 109 MG/DL
TSH SERPL DL<=0.005 MIU/L-ACNC: 1.73 UIU/ML
URATE SERPL-MCNC: 4 MG/DL
VIT B12 SERPL-MCNC: 429 PG/ML
WBC # BLD AUTO: 4.89 K/UL

## 2018-09-04 PROCEDURE — G0444 DEPRESSION SCREEN ANNUAL: HCPCS | Mod: 59,S$GLB,, | Performed by: INTERNAL MEDICINE

## 2018-09-04 PROCEDURE — 90662 IIV NO PRSV INCREASED AG IM: CPT | Mod: S$GLB,,, | Performed by: INTERNAL MEDICINE

## 2018-09-04 PROCEDURE — 1160F RVW MEDS BY RX/DR IN RCRD: CPT | Mod: S$GLB,,, | Performed by: INTERNAL MEDICINE

## 2018-09-04 PROCEDURE — 84153 ASSAY OF PSA TOTAL: CPT

## 2018-09-04 PROCEDURE — 82306 VITAMIN D 25 HYDROXY: CPT

## 2018-09-04 PROCEDURE — G0008 ADMIN INFLUENZA VIRUS VAC: HCPCS | Mod: S$GLB,,, | Performed by: INTERNAL MEDICINE

## 2018-09-04 PROCEDURE — 1170F FXNL STATUS ASSESSED: CPT | Mod: S$GLB,,, | Performed by: INTERNAL MEDICINE

## 2018-09-04 PROCEDURE — 84550 ASSAY OF BLOOD/URIC ACID: CPT

## 2018-09-04 PROCEDURE — 82607 VITAMIN B-12: CPT

## 2018-09-04 PROCEDURE — 84443 ASSAY THYROID STIM HORMONE: CPT

## 2018-09-04 PROCEDURE — 1090F PRES/ABSN URINE INCON ASSESS: CPT | Mod: S$GLB,,, | Performed by: INTERNAL MEDICINE

## 2018-09-04 PROCEDURE — 80061 LIPID PANEL: CPT

## 2018-09-04 PROCEDURE — 80053 COMPREHEN METABOLIC PANEL: CPT

## 2018-09-04 PROCEDURE — 3017F COLORECTAL CA SCREEN DOC REV: CPT | Mod: S$GLB,,, | Performed by: INTERNAL MEDICINE

## 2018-09-04 PROCEDURE — 82550 ASSAY OF CK (CPK): CPT

## 2018-09-04 PROCEDURE — 85025 COMPLETE CBC W/AUTO DIFF WBC: CPT

## 2018-09-04 PROCEDURE — G0447 BEHAVIOR COUNSEL OBESITY 15M: HCPCS | Mod: 59,S$GLB,, | Performed by: INTERNAL MEDICINE

## 2018-09-04 PROCEDURE — 83036 HEMOGLOBIN GLYCOSYLATED A1C: CPT

## 2018-09-04 PROCEDURE — G0442 ANNUAL ALCOHOL SCREEN 15 MIN: HCPCS | Mod: 59,S$GLB,, | Performed by: INTERNAL MEDICINE

## 2018-09-04 PROCEDURE — 36415 COLL VENOUS BLD VENIPUNCTURE: CPT

## 2018-09-04 PROCEDURE — 1159F MED LIST DOCD IN RCRD: CPT | Mod: S$GLB,,, | Performed by: INTERNAL MEDICINE

## 2018-09-04 PROCEDURE — 3066F NEPHROPATHY DOC TX: CPT | Mod: S$GLB,,, | Performed by: INTERNAL MEDICINE

## 2018-09-04 PROCEDURE — G0439 PPPS, SUBSEQ VISIT: HCPCS | Mod: S$GLB,,, | Performed by: INTERNAL MEDICINE

## 2018-09-04 PROCEDURE — 0521F PLAN OF CARE 4 PAIN DOCD: CPT | Mod: S$GLB,,, | Performed by: INTERNAL MEDICINE

## 2018-09-04 RX ORDER — COLCHICINE 0.6 MG/1
0.6 TABLET ORAL 2 TIMES DAILY PRN
Qty: 60 TABLET | Refills: 1 | Status: SHIPPED | OUTPATIENT
Start: 2018-09-04 | End: 2019-03-12

## 2018-09-04 NOTE — PATIENT INSTRUCTIONS
"Coordinated Plan of Care - 2017        You have been identified as having 2 or more chronic medical problems.  The goal of this plan is to minimize the impact of these medical problems on your health and lifespan.      Regaurdless of your medical problems there are 7 things you can do to likely make you live longer.  They are:  1. Do not smoke or quit smoking.   Quit smoking aids (gum, lozenges, and medications) are paid for by registering with the [place] [employer] at   [facility] - [phone]  Toll Free - [phone]  Web - service@smokingcessationtrust.org    2. Maintain a Body Mass Index < 27. BMI = Your weight in Pounds / (Your Height in Inches)2 × 703.  Examples: 5'0" < 138 lbs., 5'4" < 157 lbs, 5'8" < 177 lbs., 6' < 199 lbs.  (Add about 5 lbs. per additional inch).    3. Engage in regular physical activity.   Exercise aerobically with a target heart rate of (220-age) x 0.8  Exercise 30-45 minutes on most days of the week.  If you are out of shape you may have to start with 5-10 minutes per day and slowly increase your time.    4. Eat a healthy diet.  Salt: 1.2 - 3 grams of Sodium each day.  (If you have high blood pressure it should be < 1.2 grams each day).  Supplements  All supplements can be obtained through a varied, healthy diet.   Calcium: 1,000 - 1,200 mg each day.  (1,500 mg each day if you have osteoporosis).   8 oz milk or Calcium fortified O.J. = 300 mg,  1oz of cheese = 100-200 mg  Vitamin D: 800 iu each day- Can probably be obtained by 30 min. of direct sunlight each day       3 oz. Xatgbd=208 iu,  3 oz. Tuna =150 iu, Milk or fortified O.J. = 120 iu  Fish oil: 1-2 grams each day or about 840 mg of EPA and DHA (Omega-3 fatty acids) each day       3 oz. [place]=2 grams,  3 oz. Tuna=1.3 grams,  3 oz. drained light Tuna= 0.25 grams    5. Maintain a normal cholesterol.  Your Bad cholesterol (LDL) should be < 130.  (LDL < 100 if you have heart artery disease, a stroke, vascular disease, or " diabetes).    6. Maintain a normal Blood Pressure.  BP < 130/80    7. Maintain a normal Glucose (blood sugar).  Fasting Glucose < 100.  Hgb A1c < 6.0.  If you have diabetes a Hgb A1C < 7.0.  If you have diabetes and are older than 65 a Hgb A1C < 8.0.        Other Lifestyle guidelines  Alcohol: 1 drink = 12 oz. domestic beer, 4 oz. wine, or 1 oz. hard (80 proof) liquor             Males: </= 14 drinks per week with no more than 4 in any one day             Females: </= 7 drinks per week with no more than 3 in any one day    Depression: If you feel depressed discuss with your doctor.    Routine tests  You should be see by your Primary Care Doctor at least every 6 months.  Blood pressure check at each visit.  Cholesterol check every year.  Glucose check every year. Every 3-6 months if you have diabetes.  TSH (thyroid screen) every 2 years. Every 6-12 months if you take thyroid medicine.  Colonoscopy at age 50, and repeat every 10 years. until age 75.  Vision screen at age 65. Annual dilated retinal exam if you have diabetes.  Females - Mammogram every 1-2 years from age 40-75.                   Bone density scan at about age 65.  Males: Consider PSA screening annually at age 50, age 45 for  Americans, up to age 75.    Immunizations  Influenza vaccine every year in the fall.  Tetnus/Diptheria/Pertusis(Tdap) vaccine once, then Tetnus/Diptheria(Td) vaccine every 10 years.  Shingles vaccine at age 60.  Pneumonia vaccine at age 65. 2nd Pneumonia vaccine at least 1 year later (1st should be Prevnar-13 and 2nd should be Pneumovax-23).

## 2018-09-04 NOTE — PROGRESS NOTES
Subjective:       Patient ID: Av Dorantes is a 72 y.o. male.    Chief Complaint: Follow-up (6 month / discuss Colcrys coverage, needs alternative)    Has arthritis in ankle, knees, and hands.    Annual Wellness Exam:    Cognitive Impairment: None    Mental Conditions: None    Depression Risk Factors: None    Functional Ability:    Steady gait: Yes  Self reliant: Yes   Safe home: Yes  Hearing Difficulties: No   Vision Difficulties: No    Physical Impairment: None    Living Will: See Patient Demographics    Functional assessment:  Able to do all ADL's  Fall Risk: Low  Urinary incontinence: No  Energy level: Good  Mental well-being: Good    HEIDS Measure screening dates:  BMI: See Vital signs      DEXA:               See Health Maintenance Report  colon screen:    See Health Maintenance Report      Mammogram:   See Health Maintenance Report                   Glaucoma screen:  per Optho                    Vaccines: See Health Maintenance Report  Flu:            Pneumovax:  Shingrix:       Pain management: Denies pain       The patient's current health status is: Good   Patient was educated on all medical problems. See A/P from note dated today.                               Hypertension   This is a chronic problem. The current episode started more than 1 year ago. The problem is controlled.     Review of Systems   Constitutional: Negative.    Respiratory: Negative.    Cardiovascular: Negative.    Psychiatric/Behavioral: Negative for dysphoric mood.       Objective:      Physical Exam   Constitutional: He appears well-developed and well-nourished.   HENT:   Head: Normocephalic and atraumatic.   Eyes: Pupils are equal, round, and reactive to light.   Cardiovascular: Normal rate, regular rhythm and normal heart sounds.   Pulmonary/Chest: Effort normal.   Musculoskeletal: He exhibits edema.   1+ edema bilateral.   Neurological: He is alert.       Assessment:       1. High cholesterol    2. Essential hypertension    3.  Idiopathic chronic gout of multiple sites without tophus    4. Ureteral stone    5. Routine adult health maintenance    6. Screening for depression    7. Screening for alcoholism    8. Screening for obesity    9. Obesity (BMI 30.0-34.9)        Plan:       Per orders and D/C instructions.  Continue meds/diet for gout, Hx kidney stone, HTN, and high cholest. which are stable.     Check labs.    Screening: The patient was screened for depression with the PHQ2 questionnaire and possible health consequences were discussed with the patient, who understands (15 minutes spent). The patient was screened for the misuse of alcohol, by asking the number of drinks per average week, and if pt has had more than 4 drinks (more than 3 for women and elderly) in 1 day within the past year. The health and legal consequences of misuse were discussed (15 minutes spent). The patient was screened for obesity (BMI>30), If the current BMI > 30, then the possible consequences of obesity, as well as the benefits of diet, exercise, and weight loss were discussed (15 minutes spent).

## 2018-10-05 ENCOUNTER — DOCUMENTATION ONLY (OUTPATIENT)
Dept: INTERNAL MEDICINE | Facility: CLINIC | Age: 73
End: 2018-10-05
Payer: MEDICARE

## 2018-10-05 DIAGNOSIS — E78.00 PURE HYPERCHOLESTEROLEMIA: Primary | ICD-10-CM

## 2018-10-05 DIAGNOSIS — I10 ESSENTIAL HYPERTENSION, MALIGNANT: ICD-10-CM

## 2018-10-05 DIAGNOSIS — Z78.9 KNOWN MEDICAL PROBLEMS: ICD-10-CM

## 2018-10-05 PROCEDURE — 99490 CHRNC CARE MGMT STAFF 1ST 20: CPT | Mod: S$GLB,,, | Performed by: INTERNAL MEDICINE

## 2018-10-05 RX ORDER — SIMVASTATIN 80 MG/1
80 TABLET, FILM COATED ORAL NIGHTLY
Qty: 90 TABLET | Refills: 0 | Status: SHIPPED | OUTPATIENT
Start: 2018-10-05 | End: 2019-01-10 | Stop reason: SDUPTHER

## 2018-10-30 NOTE — PROGRESS NOTES
The patient's electronic chart was reviewed during this month. The patient's medical, functional, and psychosocial needs were assessed. Need for Home health care, PT, OT, , psychiatric care, and hospice was assessed. All preventative care measures were reviewed and updated. All medications were reviewed and reconciled. Potential drug interactions and medication adherence was reviewed. Prescriptions were renewed as appropriate. Education was provided to the patient and/or caregiver as needed, and all questions were answered. Over 20 minutes were spent providing these non-face-to-face services during this calendar month.     Refilled Zocor

## 2018-12-21 ENCOUNTER — DOCUMENTATION ONLY (OUTPATIENT)
Dept: INTERNAL MEDICINE | Facility: CLINIC | Age: 73
End: 2018-12-21
Payer: MEDICARE

## 2018-12-21 DIAGNOSIS — I10 ESSENTIAL HYPERTENSION, MALIGNANT: Primary | ICD-10-CM

## 2018-12-21 DIAGNOSIS — Z78.9 KNOWN MEDICAL PROBLEMS: ICD-10-CM

## 2018-12-21 DIAGNOSIS — E78.00 PURE HYPERCHOLESTEROLEMIA: ICD-10-CM

## 2018-12-21 DIAGNOSIS — E87.6 HYPOPOTASSEMIA: ICD-10-CM

## 2018-12-21 PROCEDURE — 99490 CHRNC CARE MGMT STAFF 1ST 20: CPT | Mod: S$GLB,,, | Performed by: INTERNAL MEDICINE

## 2018-12-21 RX ORDER — POTASSIUM CITRATE 10 MEQ/1
10 TABLET, EXTENDED RELEASE ORAL
Qty: 270 TABLET | Refills: 0 | Status: SHIPPED | OUTPATIENT
Start: 2018-12-21 | End: 2019-04-17 | Stop reason: SDUPTHER

## 2018-12-28 DIAGNOSIS — G43.919 INTRACTABLE MIGRAINE WITHOUT STATUS MIGRAINOSUS, UNSPECIFIED MIGRAINE TYPE: ICD-10-CM

## 2018-12-28 DIAGNOSIS — G43.919 INTRACTABLE MIGRAINE WITHOUT STATUS MIGRAINOSUS, UNSPECIFIED MIGRAINE TYPE: Primary | ICD-10-CM

## 2018-12-28 RX ORDER — BUTALBITAL, ACETAMINOPHEN AND CAFFEINE 50; 325; 40 MG/1; MG/1; MG/1
1 TABLET ORAL DAILY PRN
Qty: 20 TABLET | Refills: 0 | Status: SHIPPED | OUTPATIENT
Start: 2018-12-28 | End: 2018-12-28

## 2018-12-28 RX ORDER — BUTALBITAL, ACETAMINOPHEN AND CAFFEINE 50; 325; 40 MG/1; MG/1; MG/1
1 TABLET ORAL DAILY PRN
Qty: 20 TABLET | Refills: 0 | Status: SHIPPED | OUTPATIENT
Start: 2018-12-28 | End: 2019-01-22 | Stop reason: SDUPTHER

## 2018-12-28 NOTE — PROGRESS NOTES
The patient's electronic chart was reviewed during this month. The patient's medical, functional, and psychosocial needs were assessed. Need for Home health care, PT, OT, , psychiatric care, and hospice was assessed. All preventative care measures were reviewed and updated. All medications were reviewed and reconciled. Potential drug interactions and medication adherence was reviewed. Prescriptions were renewed as appropriate. Education was provided to the patient and/or caregiver as needed, and all questions were answered. Over 20 minutes were spent providing these non-face-to-face services during this calendar month.     Refilled Urocrit

## 2018-12-31 DIAGNOSIS — I10 ESSENTIAL HYPERTENSION: ICD-10-CM

## 2018-12-31 RX ORDER — IRBESARTAN 300 MG/1
300 TABLET ORAL DAILY
Qty: 90 TABLET | Refills: 1 | Status: SHIPPED | OUTPATIENT
Start: 2018-12-31 | End: 2019-06-24 | Stop reason: SDUPTHER

## 2019-01-10 RX ORDER — SIMVASTATIN 80 MG/1
80 TABLET, FILM COATED ORAL NIGHTLY
Qty: 90 TABLET | Refills: 1 | Status: SHIPPED | OUTPATIENT
Start: 2019-01-10 | End: 2019-07-15 | Stop reason: SDUPTHER

## 2019-01-11 DIAGNOSIS — M10.9 GOUT, UNSPECIFIED CAUSE, UNSPECIFIED CHRONICITY, UNSPECIFIED SITE: ICD-10-CM

## 2019-01-11 RX ORDER — FEBUXOSTAT 80 MG/1
TABLET ORAL
Qty: 90 TABLET | Refills: 1 | Status: SHIPPED | OUTPATIENT
Start: 2019-01-11 | End: 2019-09-30 | Stop reason: SDUPTHER

## 2019-01-18 ENCOUNTER — DOCUMENTATION ONLY (OUTPATIENT)
Dept: INTERNAL MEDICINE | Facility: CLINIC | Age: 74
End: 2019-01-18

## 2019-01-22 DIAGNOSIS — G43.919 INTRACTABLE MIGRAINE WITHOUT STATUS MIGRAINOSUS, UNSPECIFIED MIGRAINE TYPE: ICD-10-CM

## 2019-01-22 RX ORDER — SUMATRIPTAN SUCCINATE 100 MG/1
100 TABLET ORAL DAILY PRN
Qty: 15 TABLET | Refills: 3 | Status: SHIPPED | OUTPATIENT
Start: 2019-01-22 | End: 2020-03-10

## 2019-01-23 NOTE — PROGRESS NOTES
Received correspondence from [facility] advising Esomeprazole 40mg is non-formulary.  Called patient to discuss he said one worked for him and one did not but he was not home to check on it.  Will call back to let us know if we can change to formulary med or need to do prior auth for Esomeprazole.    Also followed up with him on lab results where Dr. Larry wanted him to get CT scan ordered by us or Dr. Cisse.  Patient saw Dr. Cisse he sent him to Dr. Varela who ordered CT scan and he said that everything was okay (will try to obtain scans).  He also advised the he has an appointment next week with Dr. Hong to talk about hernia surgery.

## 2019-03-12 ENCOUNTER — OFFICE VISIT (OUTPATIENT)
Dept: INTERNAL MEDICINE | Facility: CLINIC | Age: 74
End: 2019-03-12
Attending: INTERNAL MEDICINE
Payer: MEDICARE

## 2019-03-12 VITALS
HEIGHT: 73 IN | HEART RATE: 80 BPM | BODY MASS INDEX: 34.99 KG/M2 | OXYGEN SATURATION: 98 % | WEIGHT: 264 LBS | SYSTOLIC BLOOD PRESSURE: 128 MMHG | DIASTOLIC BLOOD PRESSURE: 70 MMHG

## 2019-03-12 DIAGNOSIS — E78.00 HIGH CHOLESTEROL: Primary | ICD-10-CM

## 2019-03-12 DIAGNOSIS — I10 ESSENTIAL HYPERTENSION: ICD-10-CM

## 2019-03-12 DIAGNOSIS — M1A.09X0 IDIOPATHIC CHRONIC GOUT OF MULTIPLE SITES WITHOUT TOPHUS: ICD-10-CM

## 2019-03-12 PROCEDURE — 99214 OFFICE O/P EST MOD 30 MIN: CPT | Mod: S$GLB,,, | Performed by: INTERNAL MEDICINE

## 2019-03-12 PROCEDURE — 99214 PR OFFICE/OUTPT VISIT, EST, LEVL IV, 30-39 MIN: ICD-10-PCS | Mod: S$GLB,,, | Performed by: INTERNAL MEDICINE

## 2019-03-12 RX ORDER — COLCHICINE 0.6 MG/1
0.6 TABLET ORAL DAILY
Qty: 60 TABLET | Refills: 1
Start: 2019-03-12 | End: 2019-03-19 | Stop reason: SDUPTHER

## 2019-03-12 NOTE — PROGRESS NOTES
Subjective:       Patient ID: Av Dorantes is a 73 y.o. male.    Chief Complaint: Follow-up (6 month)    Closed shop for prison today.      Hypertension   This is a chronic problem. The current episode started more than 1 year ago. The problem is controlled.     Review of Systems   Constitutional: Negative.    Respiratory: Negative.    Cardiovascular: Negative.    Musculoskeletal: Positive for arthralgias.       Objective:      Physical Exam   Constitutional: He appears well-developed and well-nourished.   HENT:   Head: Normocephalic and atraumatic.   Eyes: Pupils are equal, round, and reactive to light.   Cardiovascular: Normal rate and regular rhythm.   Murmur heard.   Crescendo decrescendo systolic murmur is present with a grade of 2/6.  @RUSB   Pulmonary/Chest: Effort normal.   Musculoskeletal: He exhibits edema.   1+ edema bilateral.   Neurological: He is alert.       Assessment:       1. High cholesterol    2. Idiopathic chronic gout of multiple sites without tophus    3. Essential hypertension        Plan:       Per orders and D/C instructions.  Continue meds/diet for gout, HTN, and high cholest. which are stable.     Defers SE or Echo at this time.

## 2019-03-19 RX ORDER — COLCHICINE 0.6 MG/1
0.6 TABLET ORAL DAILY
Qty: 60 TABLET | Refills: 1 | Status: SHIPPED | OUTPATIENT
Start: 2019-03-19 | End: 2019-09-10 | Stop reason: SDUPTHER

## 2019-04-17 ENCOUNTER — DOCUMENTATION ONLY (OUTPATIENT)
Dept: INTERNAL MEDICINE | Facility: CLINIC | Age: 74
End: 2019-04-17
Payer: MEDICARE

## 2019-04-17 DIAGNOSIS — Z78.9 KNOWN MEDICAL PROBLEMS: ICD-10-CM

## 2019-04-17 DIAGNOSIS — E78.00 PURE HYPERCHOLESTEROLEMIA: Primary | ICD-10-CM

## 2019-04-17 DIAGNOSIS — I10 ESSENTIAL HYPERTENSION, MALIGNANT: ICD-10-CM

## 2019-04-17 DIAGNOSIS — E87.6 HYPOPOTASSEMIA: ICD-10-CM

## 2019-04-17 PROCEDURE — 99490 PR CHRONIC CARE MGMT, 1ST 20 MIN: ICD-10-PCS | Mod: S$GLB,,, | Performed by: INTERNAL MEDICINE

## 2019-04-17 PROCEDURE — 99490 CHRNC CARE MGMT STAFF 1ST 20: CPT | Mod: S$GLB,,, | Performed by: INTERNAL MEDICINE

## 2019-04-17 RX ORDER — POTASSIUM CITRATE 10 MEQ/1
10 TABLET, EXTENDED RELEASE ORAL
Qty: 270 TABLET | Refills: 0 | Status: SHIPPED | OUTPATIENT
Start: 2019-04-17 | End: 2019-05-14 | Stop reason: SDUPTHER

## 2019-04-30 NOTE — PROGRESS NOTES
The patient's electronic chart was reviewed during this month. The patient's medical, functional, and psychosocial needs were assessed. Need for Home health care, PT, OT, , psychiatric care, and hospice was assessed. All preventative care measures were reviewed and updated. All medications were reviewed and reconciled. Potential drug interactions and medication adherence was reviewed. Prescriptions were renewed as appropriate. Education was provided to the patient and/or caregiver as needed, and all questions were answered. Over 20 minutes were spent providing these non-face-to-face services during this calendar month.     Refilled Potassium

## 2019-05-14 DIAGNOSIS — E87.6 HYPOPOTASSEMIA: ICD-10-CM

## 2019-05-14 RX ORDER — POTASSIUM CITRATE 10 MEQ/1
10 TABLET, EXTENDED RELEASE ORAL
Qty: 270 TABLET | Refills: 1 | Status: SHIPPED | OUTPATIENT
Start: 2019-05-14 | End: 2020-06-03

## 2019-06-24 DIAGNOSIS — I10 ESSENTIAL HYPERTENSION: ICD-10-CM

## 2019-06-24 RX ORDER — IRBESARTAN 300 MG/1
300 TABLET ORAL DAILY
Qty: 90 TABLET | Refills: 1 | Status: SHIPPED | OUTPATIENT
Start: 2019-06-24 | End: 2019-12-26 | Stop reason: SDUPTHER

## 2019-07-15 RX ORDER — SIMVASTATIN 80 MG/1
80 TABLET, FILM COATED ORAL NIGHTLY
Qty: 90 TABLET | Refills: 1 | Status: SHIPPED | OUTPATIENT
Start: 2019-07-15 | End: 2020-01-20 | Stop reason: SDUPTHER

## 2019-09-10 ENCOUNTER — OFFICE VISIT (OUTPATIENT)
Dept: INTERNAL MEDICINE | Facility: CLINIC | Age: 74
End: 2019-09-10
Attending: INTERNAL MEDICINE
Payer: MEDICARE

## 2019-09-10 ENCOUNTER — LAB VISIT (OUTPATIENT)
Dept: LAB | Facility: OTHER | Age: 74
End: 2019-09-10
Attending: INTERNAL MEDICINE
Payer: MEDICARE

## 2019-09-10 VITALS
HEIGHT: 73 IN | WEIGHT: 264 LBS | SYSTOLIC BLOOD PRESSURE: 110 MMHG | HEART RATE: 73 BPM | BODY MASS INDEX: 34.99 KG/M2 | DIASTOLIC BLOOD PRESSURE: 68 MMHG | OXYGEN SATURATION: 97 %

## 2019-09-10 DIAGNOSIS — Z13.31 SCREENING FOR DEPRESSION: ICD-10-CM

## 2019-09-10 DIAGNOSIS — E78.9 DISORDER OF LIPID METABOLISM: ICD-10-CM

## 2019-09-10 DIAGNOSIS — Z12.5 SCREENING FOR PROSTATE CANCER: ICD-10-CM

## 2019-09-10 DIAGNOSIS — D51.0 PERNICIOUS ANEMIA: ICD-10-CM

## 2019-09-10 DIAGNOSIS — Z00.00 ROUTINE ADULT HEALTH MAINTENANCE: ICD-10-CM

## 2019-09-10 DIAGNOSIS — Z13.39 SCREENING FOR ALCOHOLISM: ICD-10-CM

## 2019-09-10 DIAGNOSIS — I10 ESSENTIAL HYPERTENSION: ICD-10-CM

## 2019-09-10 DIAGNOSIS — E03.9 HYPOTHYROIDISM, UNSPECIFIED TYPE: ICD-10-CM

## 2019-09-10 DIAGNOSIS — M1A.09X0 IDIOPATHIC CHRONIC GOUT OF MULTIPLE SITES WITHOUT TOPHUS: Primary | ICD-10-CM

## 2019-09-10 DIAGNOSIS — E66.9 OBESITY (BMI 30.0-34.9): ICD-10-CM

## 2019-09-10 DIAGNOSIS — Z13.89 SCREENING FOR OBESITY: ICD-10-CM

## 2019-09-10 DIAGNOSIS — M1A.09X0 IDIOPATHIC CHRONIC GOUT OF MULTIPLE SITES WITHOUT TOPHUS: ICD-10-CM

## 2019-09-10 DIAGNOSIS — D50.8 OTHER IRON DEFICIENCY ANEMIA: ICD-10-CM

## 2019-09-10 DIAGNOSIS — E55.9 VITAMIN D DEFICIENCY: ICD-10-CM

## 2019-09-10 DIAGNOSIS — Z23 FLU VACCINE NEED: Primary | ICD-10-CM

## 2019-09-10 DIAGNOSIS — R79.89 OTHER SPECIFIED ABNORMAL FINDINGS OF BLOOD CHEMISTRY: ICD-10-CM

## 2019-09-10 DIAGNOSIS — E78.00 HIGH CHOLESTEROL: ICD-10-CM

## 2019-09-10 LAB
25(OH)D3+25(OH)D2 SERPL-MCNC: 39 NG/ML (ref 30–96)
ALBUMIN SERPL BCP-MCNC: 4.1 G/DL (ref 3.5–5.2)
ALP SERPL-CCNC: 61 U/L (ref 55–135)
ALT SERPL W/O P-5'-P-CCNC: 38 U/L (ref 10–44)
ANION GAP SERPL CALC-SCNC: 8 MMOL/L (ref 8–16)
AST SERPL-CCNC: 29 U/L (ref 10–40)
BASOPHILS # BLD AUTO: 0.04 K/UL (ref 0–0.2)
BASOPHILS NFR BLD: 0.7 % (ref 0–1.9)
BILIRUB SERPL-MCNC: 0.7 MG/DL (ref 0.1–1)
BUN SERPL-MCNC: 16 MG/DL (ref 8–23)
CALCIUM SERPL-MCNC: 10.1 MG/DL (ref 8.7–10.5)
CHLORIDE SERPL-SCNC: 104 MMOL/L (ref 95–110)
CHOLEST SERPL-MCNC: 182 MG/DL (ref 120–199)
CHOLEST/HDLC SERPL: 3.4 {RATIO} (ref 2–5)
CK SERPL-CCNC: 245 U/L (ref 20–200)
CO2 SERPL-SCNC: 27 MMOL/L (ref 23–29)
COMPLEXED PSA SERPL-MCNC: 2.3 NG/ML (ref 0–4)
CREAT SERPL-MCNC: 1.1 MG/DL (ref 0.5–1.4)
DIFFERENTIAL METHOD: ABNORMAL
EOSINOPHIL # BLD AUTO: 0.1 K/UL (ref 0–0.5)
EOSINOPHIL NFR BLD: 2.1 % (ref 0–8)
ERYTHROCYTE [DISTWIDTH] IN BLOOD BY AUTOMATED COUNT: 11.6 % (ref 11.5–14.5)
EST. GFR  (AFRICAN AMERICAN): >60 ML/MIN/1.73 M^2
EST. GFR  (NON AFRICAN AMERICAN): >60 ML/MIN/1.73 M^2
ESTIMATED AVG GLUCOSE: 117 MG/DL (ref 68–131)
GLUCOSE SERPL-MCNC: 101 MG/DL (ref 70–110)
HBA1C MFR BLD HPLC: 5.7 % (ref 4–5.6)
HCT VFR BLD AUTO: 42.9 % (ref 40–54)
HDLC SERPL-MCNC: 54 MG/DL (ref 40–75)
HDLC SERPL: 29.7 % (ref 20–50)
HGB BLD-MCNC: 14.5 G/DL (ref 14–18)
IMM GRANULOCYTES # BLD AUTO: 0.03 K/UL (ref 0–0.04)
IMM GRANULOCYTES NFR BLD AUTO: 0.5 % (ref 0–0.5)
LDLC SERPL CALC-MCNC: 108.2 MG/DL (ref 63–159)
LYMPHOCYTES # BLD AUTO: 1.3 K/UL (ref 1–4.8)
LYMPHOCYTES NFR BLD: 21.8 % (ref 18–48)
MCH RBC QN AUTO: 33.1 PG (ref 27–31)
MCHC RBC AUTO-ENTMCNC: 33.8 G/DL (ref 32–36)
MCV RBC AUTO: 98 FL (ref 82–98)
MONOCYTES # BLD AUTO: 0.7 K/UL (ref 0.3–1)
MONOCYTES NFR BLD: 11.4 % (ref 4–15)
NEUTROPHILS # BLD AUTO: 3.7 K/UL (ref 1.8–7.7)
NEUTROPHILS NFR BLD: 63.5 % (ref 38–73)
NONHDLC SERPL-MCNC: 128 MG/DL
NRBC BLD-RTO: 0 /100 WBC
PLATELET # BLD AUTO: 159 K/UL (ref 150–350)
PMV BLD AUTO: 10.3 FL (ref 9.2–12.9)
POTASSIUM SERPL-SCNC: 5.3 MMOL/L (ref 3.5–5.1)
PROT SERPL-MCNC: 7.5 G/DL (ref 6–8.4)
RBC # BLD AUTO: 4.38 M/UL (ref 4.6–6.2)
SODIUM SERPL-SCNC: 139 MMOL/L (ref 136–145)
TRIGL SERPL-MCNC: 99 MG/DL (ref 30–150)
TSH SERPL DL<=0.005 MIU/L-ACNC: 1.82 UIU/ML (ref 0.4–4)
URATE SERPL-MCNC: 7.8 MG/DL (ref 3.4–7)
VIT B12 SERPL-MCNC: 418 PG/ML (ref 210–950)
WBC # BLD AUTO: 5.77 K/UL (ref 3.9–12.7)

## 2019-09-10 PROCEDURE — G0439 PPPS, SUBSEQ VISIT: HCPCS | Mod: S$GLB,,, | Performed by: INTERNAL MEDICINE

## 2019-09-10 PROCEDURE — 83036 HEMOGLOBIN GLYCOSYLATED A1C: CPT

## 2019-09-10 PROCEDURE — G0439 PR MEDICARE ANNUAL WELLNESS SUBSEQUENT VISIT: ICD-10-PCS | Mod: S$GLB,,, | Performed by: INTERNAL MEDICINE

## 2019-09-10 PROCEDURE — G0447 BEHAVIOR COUNSEL OBESITY 15M: HCPCS | Mod: S$GLB,,, | Performed by: INTERNAL MEDICINE

## 2019-09-10 PROCEDURE — 82306 VITAMIN D 25 HYDROXY: CPT

## 2019-09-10 PROCEDURE — G0008 ADMIN INFLUENZA VIRUS VAC: HCPCS | Mod: S$GLB,,, | Performed by: INTERNAL MEDICINE

## 2019-09-10 PROCEDURE — 80061 LIPID PANEL: CPT

## 2019-09-10 PROCEDURE — 84550 ASSAY OF BLOOD/URIC ACID: CPT

## 2019-09-10 PROCEDURE — 90662 IIV NO PRSV INCREASED AG IM: CPT | Mod: S$GLB,,, | Performed by: INTERNAL MEDICINE

## 2019-09-10 PROCEDURE — 82607 VITAMIN B-12: CPT

## 2019-09-10 PROCEDURE — 36415 COLL VENOUS BLD VENIPUNCTURE: CPT

## 2019-09-10 PROCEDURE — 80053 COMPREHEN METABOLIC PANEL: CPT

## 2019-09-10 PROCEDURE — G0442 ANNUAL ALCOHOL SCREEN 15 MIN: HCPCS | Mod: S$GLB,,, | Performed by: INTERNAL MEDICINE

## 2019-09-10 PROCEDURE — G0444 DEPRESSION SCREEN ANNUAL: HCPCS | Mod: 59,S$GLB,, | Performed by: INTERNAL MEDICINE

## 2019-09-10 PROCEDURE — G0008 FLU VACCINE - HIGH DOSE (65+) PRESERVATIVE FREE IM: ICD-10-PCS | Mod: S$GLB,,, | Performed by: INTERNAL MEDICINE

## 2019-09-10 PROCEDURE — 90662 FLU VACCINE - HIGH DOSE (65+) PRESERVATIVE FREE IM: ICD-10-PCS | Mod: S$GLB,,, | Performed by: INTERNAL MEDICINE

## 2019-09-10 PROCEDURE — G0442 PR  ALCOHOL SCREENING: ICD-10-PCS | Mod: S$GLB,,, | Performed by: INTERNAL MEDICINE

## 2019-09-10 PROCEDURE — 82550 ASSAY OF CK (CPK): CPT

## 2019-09-10 PROCEDURE — 84443 ASSAY THYROID STIM HORMONE: CPT

## 2019-09-10 PROCEDURE — 84153 ASSAY OF PSA TOTAL: CPT

## 2019-09-10 PROCEDURE — 85025 COMPLETE CBC W/AUTO DIFF WBC: CPT

## 2019-09-10 PROCEDURE — G0447 PR OBESITY COUNSELING: ICD-10-PCS | Mod: S$GLB,,, | Performed by: INTERNAL MEDICINE

## 2019-09-10 PROCEDURE — G0444 PR DEPRESSION SCREENING: ICD-10-PCS | Mod: 59,S$GLB,, | Performed by: INTERNAL MEDICINE

## 2019-09-10 RX ORDER — COLCHICINE 0.6 MG/1
0.6 TABLET ORAL 2 TIMES DAILY PRN
Qty: 30 TABLET | Refills: 1 | Status: SHIPPED | OUTPATIENT
Start: 2019-09-10 | End: 2020-05-22

## 2019-09-11 NOTE — PROGRESS NOTES
Subjective:       Patient ID: Av Dorantes is a 73 y.o. male.    Chief Complaint: Follow-up (6 month)    Annual Wellness Exam:    Cognitive Impairment: None    Mental Conditions: None    Depression Risk Factors: None    Functional Ability:    Steady gait: Yes  Self reliant: Yes   Safe home: Yes  Hearing Difficulties: No   Vision Difficulties: No    Physical Impairment: None    Living Will: See Patient Demographics    Functional assessment:  Able to do all ADL's  Fall Risk: Low  Urinary incontinence: No  Energy level: Good  Mental well-being: Good    HEIDS Measure screening dates:  BMI: See Vital signs      DEXA:               See Health Maintenance Report  colon screen:    See Health Maintenance Report      Mammogram:   See Health Maintenance Report                   Glaucoma screen:  per Optho                    Vaccines: See Health Maintenance Report  Flu:            Pneumovax:  Shingrix:       Pain management: Denies pain       The patient's current health status is: Good   Patient was educated on all medical problems. See A/P from note dated today.                             Hypertension   This is a chronic problem. The current episode started more than 1 year ago. The problem is controlled.     Review of Systems   Constitutional: Negative.    Respiratory: Negative.    Cardiovascular: Negative.    Psychiatric/Behavioral: Negative for dysphoric mood.       Objective:      Physical Exam   Constitutional: He appears well-developed and well-nourished.   HENT:   Head: Normocephalic and atraumatic.   Eyes: Pupils are equal, round, and reactive to light.   Cardiovascular: Normal rate and regular rhythm.   Murmur heard.   Crescendo decrescendo systolic murmur is present with a grade of 2/6.  @RUSB   Pulmonary/Chest: Effort normal.   Musculoskeletal: He exhibits edema.   1+ edema bilateral.   Neurological: He is alert.       Assessment:       1. Flu vaccine need    2. High cholesterol    3. Idiopathic chronic gout of  multiple sites without [facility]    4. Essential hypertension    5. Obesity (BMI 30.0-34.9)    6. Routine adult health maintenance    7. Screening for alcoholism    8. Screening for obesity    9. Screening for depression        Plan:       Per orders and D/C instructions.  Continue meds/diet for gout, HTN, and high cholesterol, which are stable.     Check labs.    Screening: The patient was screened for depression with the PHQ2 questionnaire and possible health consequences were discussed with the patient, who understands (15 minutes spent). The patient was screened for the misuse of alcohol, by asking the number of drinks per average week, and if pt has had more than 4 drinks (more than 3 for women and elderly) in 1 day within the past year. The health and legal consequences of misuse were discussed (15 minutes spent). The patient was screened for obesity (BMI>30), If the current BMI > 30, then the possible consequences of obesity, as well as the benefits of diet, exercise, and weight loss were discussed. Any behavioral risks were identified, and methods to achieve appropriate treatment goals were discussed (15 minutes spent).

## 2019-09-30 DIAGNOSIS — M10.9 GOUT, UNSPECIFIED CAUSE, UNSPECIFIED CHRONICITY, UNSPECIFIED SITE: ICD-10-CM

## 2019-09-30 RX ORDER — FEBUXOSTAT 80 MG/1
TABLET, FILM COATED ORAL
Qty: 90 TABLET | Refills: 3 | Status: SHIPPED | OUTPATIENT
Start: 2019-09-30 | End: 2020-09-22

## 2019-11-26 DIAGNOSIS — R19.7 DIARRHEA: ICD-10-CM

## 2019-11-26 RX ORDER — DIPHENOXYLATE HYDROCHLORIDE AND ATROPINE SULFATE 2.5; .025 MG/1; MG/1
1 TABLET ORAL EVERY 6 HOURS PRN
Qty: 30 TABLET | Refills: 0 | Status: SHIPPED | OUTPATIENT
Start: 2019-11-26 | End: 2020-03-10

## 2019-12-26 ENCOUNTER — DOCUMENTATION ONLY (OUTPATIENT)
Dept: INTERNAL MEDICINE | Facility: CLINIC | Age: 74
End: 2019-12-26
Payer: MEDICARE

## 2019-12-26 DIAGNOSIS — I10 ESSENTIAL HYPERTENSION, MALIGNANT: Primary | ICD-10-CM

## 2019-12-26 DIAGNOSIS — Z78.9 KNOWN MEDICAL PROBLEMS: ICD-10-CM

## 2019-12-26 DIAGNOSIS — E78.00 PURE HYPERCHOLESTEROLEMIA: ICD-10-CM

## 2019-12-26 DIAGNOSIS — I10 ESSENTIAL HYPERTENSION: ICD-10-CM

## 2019-12-26 PROCEDURE — 99490 PR CHRONIC CARE MGMT, 1ST 20 MIN: ICD-10-PCS | Mod: S$GLB,,, | Performed by: INTERNAL MEDICINE

## 2019-12-26 PROCEDURE — 99490 CHRNC CARE MGMT STAFF 1ST 20: CPT | Mod: S$GLB,,, | Performed by: INTERNAL MEDICINE

## 2019-12-26 RX ORDER — IRBESARTAN 300 MG/1
300 TABLET ORAL DAILY
Qty: 90 TABLET | Refills: 1 | Status: SHIPPED | OUTPATIENT
Start: 2019-12-26 | End: 2020-07-28 | Stop reason: SDUPTHER

## 2019-12-30 NOTE — PROGRESS NOTES
The patient's electronic chart was reviewed during this month. The patient's medical, functional, and psychosocial needs were assessed. Need for Home health care, PT, OT, , psychiatric care, and hospice was assessed. All preventative care measures were reviewed and updated. All medications were reviewed and reconciled. Potential drug interactions and medication adherence was reviewed. Prescriptions were renewed as appropriate. Education was provided to the patient and/or caregiver as needed, and all questions were answered. Over 20 minutes were spent providing these non-face-to-face services during this calendar month.     Refilled Irbesartan

## 2020-01-20 RX ORDER — SIMVASTATIN 80 MG/1
80 TABLET, FILM COATED ORAL NIGHTLY
Qty: 90 TABLET | Refills: 1 | Status: SHIPPED | OUTPATIENT
Start: 2020-01-20 | End: 2020-08-05 | Stop reason: SDUPTHER

## 2020-03-10 ENCOUNTER — OFFICE VISIT (OUTPATIENT)
Dept: INTERNAL MEDICINE | Facility: CLINIC | Age: 75
End: 2020-03-10
Attending: INTERNAL MEDICINE
Payer: MEDICARE

## 2020-03-10 VITALS
WEIGHT: 266 LBS | DIASTOLIC BLOOD PRESSURE: 70 MMHG | HEIGHT: 73 IN | SYSTOLIC BLOOD PRESSURE: 108 MMHG | HEART RATE: 67 BPM | OXYGEN SATURATION: 97 % | BODY MASS INDEX: 35.25 KG/M2

## 2020-03-10 DIAGNOSIS — E78.00 HIGH CHOLESTEROL: Primary | ICD-10-CM

## 2020-03-10 DIAGNOSIS — M17.0 PRIMARY OSTEOARTHRITIS OF BOTH KNEES: ICD-10-CM

## 2020-03-10 DIAGNOSIS — I10 ESSENTIAL HYPERTENSION: ICD-10-CM

## 2020-03-10 PROCEDURE — 99214 OFFICE O/P EST MOD 30 MIN: CPT | Mod: S$GLB,,, | Performed by: INTERNAL MEDICINE

## 2020-03-10 PROCEDURE — 99214 PR OFFICE/OUTPT VISIT, EST, LEVL IV, 30-39 MIN: ICD-10-PCS | Mod: S$GLB,,, | Performed by: INTERNAL MEDICINE

## 2020-03-10 RX ORDER — CELECOXIB 200 MG/1
200 CAPSULE ORAL DAILY PRN
Qty: 30 CAPSULE | Refills: 3 | Status: SHIPPED | OUTPATIENT
Start: 2020-03-10 | End: 2020-06-25

## 2020-03-10 NOTE — PROGRESS NOTES
Subjective:       Patient ID: Av Dorantes is a 74 y.o. male.    Chief Complaint: Follow-up (6 month); Temporomandibular Joint Pain (discuss Celebrex script); and Sinus Problem (seeing ENT)    He felt achy, especially in his knees on a cold day, and took 1 of his friends Celebrex which gave him very good relief.  He would like to have a prescription to take as needed.    Follow-up   This is a chronic problem. The current episode started more than 1 year ago. The problem has been unchanged. Associated symptoms include arthralgias.   Sinus Problem   This is a recurrent problem. The current episode started more than 1 month ago.   Hypertension   This is a chronic problem. The current episode started more than 1 year ago. The problem is controlled.     Review of Systems   Constitutional: Negative.    Respiratory: Negative.    Cardiovascular: Negative.    Musculoskeletal: Positive for arthralgias.       Objective:      Physical Exam   Constitutional: He appears well-developed and well-nourished.   HENT:   Head: Normocephalic and atraumatic.   Eyes: Pupils are equal, round, and reactive to light.   Cardiovascular: Normal rate and regular rhythm.   Murmur heard.   Crescendo decrescendo systolic murmur is present with a grade of 2/6.  @RUSB   Pulmonary/Chest: Effort normal.   Musculoskeletal: He exhibits edema.   1+ edema bilateral.   Neurological: He is alert.       Assessment:       1. High cholesterol    2. Essential hypertension    3. Primary osteoarthritis of both knees        Plan:       Per orders and D/C instructions.  Continue meds/diet for HTN, and high cholesterol, which are stable.     Celebrex as needed for arthritis of knees.

## 2020-05-19 DIAGNOSIS — M1A.09X0 IDIOPATHIC CHRONIC GOUT OF MULTIPLE SITES WITHOUT TOPHUS: Primary | ICD-10-CM

## 2020-05-19 RX ORDER — INDOMETHACIN 25 MG/1
25 CAPSULE ORAL 2 TIMES DAILY PRN
Qty: 60 CAPSULE | Refills: 0 | Status: SHIPPED | OUTPATIENT
Start: 2020-05-19 | End: 2020-05-22

## 2020-05-22 DIAGNOSIS — M1A.09X0 IDIOPATHIC CHRONIC GOUT OF MULTIPLE SITES WITHOUT TOPHUS: ICD-10-CM

## 2020-05-22 RX ORDER — INDOMETHACIN 25 MG/1
25 CAPSULE ORAL 2 TIMES DAILY PRN
Qty: 60 CAPSULE | Refills: 0 | Status: SHIPPED | OUTPATIENT
Start: 2020-05-22 | End: 2020-06-15

## 2020-05-22 RX ORDER — COLCHICINE 0.6 MG/1
TABLET, FILM COATED ORAL
Qty: 30 TABLET | Refills: 1 | Status: SHIPPED | OUTPATIENT
Start: 2020-05-22 | End: 2020-06-09

## 2020-07-17 DIAGNOSIS — Z71.89 COMPLEX CARE COORDINATION: ICD-10-CM

## 2020-07-28 DIAGNOSIS — I10 ESSENTIAL HYPERTENSION: ICD-10-CM

## 2020-07-28 RX ORDER — IRBESARTAN 300 MG/1
300 TABLET ORAL DAILY
Qty: 90 TABLET | Refills: 1 | Status: SHIPPED | OUTPATIENT
Start: 2020-07-28 | End: 2021-01-22

## 2020-08-05 ENCOUNTER — DOCUMENTATION ONLY (OUTPATIENT)
Dept: INTERNAL MEDICINE | Facility: CLINIC | Age: 75
End: 2020-08-05
Payer: MEDICARE

## 2020-08-05 DIAGNOSIS — I10 ESSENTIAL HYPERTENSION: Primary | ICD-10-CM

## 2020-08-05 DIAGNOSIS — E78.00 HIGH CHOLESTEROL: ICD-10-CM

## 2020-08-05 DIAGNOSIS — M1A.09X0 IDIOPATHIC CHRONIC GOUT OF MULTIPLE SITES WITHOUT TOPHUS: ICD-10-CM

## 2020-08-05 PROCEDURE — 99490 CHRNC CARE MGMT STAFF 1ST 20: CPT | Mod: S$GLB,,, | Performed by: INTERNAL MEDICINE

## 2020-08-05 PROCEDURE — 99490 PR CHRONIC CARE MGMT, 1ST 20 MIN: ICD-10-PCS | Mod: S$GLB,,, | Performed by: INTERNAL MEDICINE

## 2020-08-05 RX ORDER — SIMVASTATIN 80 MG/1
80 TABLET, FILM COATED ORAL NIGHTLY
Qty: 90 TABLET | Refills: 1 | Status: SHIPPED | OUTPATIENT
Start: 2020-08-05 | End: 2020-11-13

## 2020-08-25 NOTE — PROGRESS NOTES
The patient's electronic chart was reviewed during this month. The patient's medical, functional, and psychosocial needs were assessed. Need for Home health care, PT, OT, , psychiatric care, and hospice was assessed. All preventative care measures were reviewed and updated. All medications were reviewed and reconciled. Potential drug interactions and medication adherence was reviewed. Prescriptions were renewed as appropriate. Education was provided to the patient and/or caregiver as needed, and all questions were answered. Over 20 minutes were spent providing these non-face-to-face services during this calendar month.       Refilled Colcrys  Refilled Simvastatin  Refilled Irbesartan

## 2020-09-08 ENCOUNTER — OFFICE VISIT (OUTPATIENT)
Dept: INTERNAL MEDICINE | Facility: CLINIC | Age: 75
End: 2020-09-08
Attending: INTERNAL MEDICINE
Payer: MEDICARE

## 2020-09-08 ENCOUNTER — LAB VISIT (OUTPATIENT)
Dept: LAB | Facility: OTHER | Age: 75
End: 2020-09-08
Attending: INTERNAL MEDICINE
Payer: MEDICARE

## 2020-09-08 VITALS
SYSTOLIC BLOOD PRESSURE: 134 MMHG | WEIGHT: 259 LBS | HEIGHT: 73 IN | DIASTOLIC BLOOD PRESSURE: 60 MMHG | BODY MASS INDEX: 34.33 KG/M2 | HEART RATE: 66 BPM | OXYGEN SATURATION: 95 %

## 2020-09-08 DIAGNOSIS — Z13.39 SCREENING FOR ALCOHOLISM: ICD-10-CM

## 2020-09-08 DIAGNOSIS — U07.1 COVID-19: Primary | ICD-10-CM

## 2020-09-08 DIAGNOSIS — M1A.09X0 IDIOPATHIC CHRONIC GOUT OF MULTIPLE SITES WITHOUT TOPHUS: ICD-10-CM

## 2020-09-08 DIAGNOSIS — E66.9 OBESITY (BMI 30.0-34.9): ICD-10-CM

## 2020-09-08 DIAGNOSIS — E55.9 VITAMIN D DEFICIENCY: ICD-10-CM

## 2020-09-08 DIAGNOSIS — U07.1 COVID-19: ICD-10-CM

## 2020-09-08 DIAGNOSIS — E03.9 HYPOTHYROIDISM, UNSPECIFIED TYPE: ICD-10-CM

## 2020-09-08 DIAGNOSIS — D51.0 PERNICIOUS ANEMIA: ICD-10-CM

## 2020-09-08 DIAGNOSIS — E78.00 HIGH CHOLESTEROL: Primary | ICD-10-CM

## 2020-09-08 DIAGNOSIS — I10 ESSENTIAL HYPERTENSION: ICD-10-CM

## 2020-09-08 DIAGNOSIS — E78.9 DISORDER OF LIPID METABOLISM: ICD-10-CM

## 2020-09-08 DIAGNOSIS — R79.89 OTHER SPECIFIED ABNORMAL FINDINGS OF BLOOD CHEMISTRY: ICD-10-CM

## 2020-09-08 DIAGNOSIS — D50.8 OTHER IRON DEFICIENCY ANEMIA: ICD-10-CM

## 2020-09-08 DIAGNOSIS — Z13.31 SCREENING FOR DEPRESSION: ICD-10-CM

## 2020-09-08 DIAGNOSIS — Z12.5 SCREENING FOR PROSTATE CANCER: ICD-10-CM

## 2020-09-08 DIAGNOSIS — Z13.89 SCREENING FOR OBESITY: ICD-10-CM

## 2020-09-08 DIAGNOSIS — Z00.00 ROUTINE ADULT HEALTH MAINTENANCE: ICD-10-CM

## 2020-09-08 LAB
25(OH)D3+25(OH)D2 SERPL-MCNC: 36 NG/ML (ref 30–96)
ALBUMIN SERPL BCP-MCNC: 4.2 G/DL (ref 3.5–5.2)
ALP SERPL-CCNC: 55 U/L (ref 55–135)
ALT SERPL W/O P-5'-P-CCNC: 38 U/L (ref 10–44)
ANION GAP SERPL CALC-SCNC: 12 MMOL/L (ref 8–16)
AST SERPL-CCNC: 31 U/L (ref 10–40)
BASOPHILS # BLD AUTO: 0.05 K/UL (ref 0–0.2)
BASOPHILS NFR BLD: 0.8 % (ref 0–1.9)
BILIRUB SERPL-MCNC: 1 MG/DL (ref 0.1–1)
BUN SERPL-MCNC: 19 MG/DL (ref 8–23)
CALCIUM SERPL-MCNC: 9.9 MG/DL (ref 8.7–10.5)
CHLORIDE SERPL-SCNC: 105 MMOL/L (ref 95–110)
CO2 SERPL-SCNC: 22 MMOL/L (ref 23–29)
CREAT SERPL-MCNC: 1 MG/DL (ref 0.5–1.4)
DIFFERENTIAL METHOD: ABNORMAL
EOSINOPHIL # BLD AUTO: 0.1 K/UL (ref 0–0.5)
EOSINOPHIL NFR BLD: 1.9 % (ref 0–8)
ERYTHROCYTE [DISTWIDTH] IN BLOOD BY AUTOMATED COUNT: 12.2 % (ref 11.5–14.5)
EST. GFR  (AFRICAN AMERICAN): >60 ML/MIN/1.73 M^2
EST. GFR  (NON AFRICAN AMERICAN): >60 ML/MIN/1.73 M^2
GLUCOSE SERPL-MCNC: 98 MG/DL (ref 70–110)
HCT VFR BLD AUTO: 44.9 % (ref 40–54)
HGB BLD-MCNC: 14.9 G/DL (ref 14–18)
IMM GRANULOCYTES # BLD AUTO: 0.03 K/UL (ref 0–0.04)
IMM GRANULOCYTES NFR BLD AUTO: 0.5 % (ref 0–0.5)
LYMPHOCYTES # BLD AUTO: 1.3 K/UL (ref 1–4.8)
LYMPHOCYTES NFR BLD: 19.6 % (ref 18–48)
MCH RBC QN AUTO: 33.1 PG (ref 27–31)
MCHC RBC AUTO-ENTMCNC: 33.2 G/DL (ref 32–36)
MCV RBC AUTO: 100 FL (ref 82–98)
MONOCYTES # BLD AUTO: 0.8 K/UL (ref 0.3–1)
MONOCYTES NFR BLD: 12.6 % (ref 4–15)
NEUTROPHILS # BLD AUTO: 4.1 K/UL (ref 1.8–7.7)
NEUTROPHILS NFR BLD: 64.6 % (ref 38–73)
NRBC BLD-RTO: 0 /100 WBC
PLATELET # BLD AUTO: 160 K/UL (ref 150–350)
PMV BLD AUTO: 10.7 FL (ref 9.2–12.9)
POTASSIUM SERPL-SCNC: 4.6 MMOL/L (ref 3.5–5.1)
PROT SERPL-MCNC: 7.3 G/DL (ref 6–8.4)
RBC # BLD AUTO: 4.5 M/UL (ref 4.6–6.2)
SARS-COV-2 IGG SERPLBLD QL IA.RAPID: NEGATIVE
SODIUM SERPL-SCNC: 139 MMOL/L (ref 136–145)
TSH SERPL DL<=0.005 MIU/L-ACNC: 2.14 UIU/ML (ref 0.4–4)
URATE SERPL-MCNC: 5.4 MG/DL (ref 3.4–7)
WBC # BLD AUTO: 6.37 K/UL (ref 3.9–12.7)

## 2020-09-08 PROCEDURE — 84550 ASSAY OF BLOOD/URIC ACID: CPT

## 2020-09-08 PROCEDURE — 82607 VITAMIN B-12: CPT

## 2020-09-08 PROCEDURE — 85025 COMPLETE CBC W/AUTO DIFF WBC: CPT

## 2020-09-08 PROCEDURE — 3066F PR DOCUMENTATION OF TREATMENT FOR NEPHROPATHY: ICD-10-PCS | Mod: S$GLB,,, | Performed by: INTERNAL MEDICINE

## 2020-09-08 PROCEDURE — 99214 OFFICE O/P EST MOD 30 MIN: CPT | Mod: 25,S$GLB,, | Performed by: INTERNAL MEDICINE

## 2020-09-08 PROCEDURE — G0442 PR  ALCOHOL SCREENING: ICD-10-PCS | Mod: S$GLB,,, | Performed by: INTERNAL MEDICINE

## 2020-09-08 PROCEDURE — G0008 PR ADMIN INFLUENZA VIRUS VAC: ICD-10-PCS | Mod: S$GLB,,, | Performed by: INTERNAL MEDICINE

## 2020-09-08 PROCEDURE — G0447 PR OBESITY COUNSELING: ICD-10-PCS | Mod: S$GLB,,, | Performed by: INTERNAL MEDICINE

## 2020-09-08 PROCEDURE — G0447 BEHAVIOR COUNSEL OBESITY 15M: HCPCS | Mod: S$GLB,,, | Performed by: INTERNAL MEDICINE

## 2020-09-08 PROCEDURE — 36415 COLL VENOUS BLD VENIPUNCTURE: CPT

## 2020-09-08 PROCEDURE — 90694 FLU VACCINE - QUADRIVALENT - ADJUVANTED: ICD-10-PCS | Mod: S$GLB,,, | Performed by: INTERNAL MEDICINE

## 2020-09-08 PROCEDURE — G0442 ANNUAL ALCOHOL SCREEN 15 MIN: HCPCS | Mod: S$GLB,,, | Performed by: INTERNAL MEDICINE

## 2020-09-08 PROCEDURE — 3066F NEPHROPATHY DOC TX: CPT | Mod: S$GLB,,, | Performed by: INTERNAL MEDICINE

## 2020-09-08 PROCEDURE — 90694 VACC AIIV4 NO PRSRV 0.5ML IM: CPT | Mod: S$GLB,,, | Performed by: INTERNAL MEDICINE

## 2020-09-08 PROCEDURE — 84443 ASSAY THYROID STIM HORMONE: CPT

## 2020-09-08 PROCEDURE — 82306 VITAMIN D 25 HYDROXY: CPT

## 2020-09-08 PROCEDURE — G0008 ADMIN INFLUENZA VIRUS VAC: HCPCS | Mod: S$GLB,,, | Performed by: INTERNAL MEDICINE

## 2020-09-08 PROCEDURE — 86803 HEPATITIS C AB TEST: CPT

## 2020-09-08 PROCEDURE — 80061 LIPID PANEL: CPT

## 2020-09-08 PROCEDURE — 83036 HEMOGLOBIN GLYCOSYLATED A1C: CPT

## 2020-09-08 PROCEDURE — 80053 COMPREHEN METABOLIC PANEL: CPT

## 2020-09-08 PROCEDURE — 99214 PR OFFICE/OUTPT VISIT, EST, LEVL IV, 30-39 MIN: ICD-10-PCS | Mod: 25,S$GLB,, | Performed by: INTERNAL MEDICINE

## 2020-09-08 PROCEDURE — 86769 SARS-COV-2 COVID-19 ANTIBODY: CPT

## 2020-09-08 RX ORDER — COLCHICINE 0.6 MG/1
0.6 TABLET, FILM COATED ORAL DAILY
Qty: 30 TABLET | Refills: 5
Start: 2020-09-08 | End: 2021-01-19

## 2020-09-08 NOTE — PROGRESS NOTES
Subjective:       Patient ID: Av Dorantes is a 74 y.o. male.    Chief Complaint: Annual Exam    He takes Uloric and 1 colchicine every day to minimize his gout attacks.  He usually gets it in his right foot and is relieved with Indocin.    Annual Wellness Exam:    Cognitive Impairment: None    Mental Conditions: None    Depression Risk Factors: None    Functional Ability:    Steady gait: Yes  Self reliant: Yes   Safe home: Yes  Hearing Difficulties: No   Vision Difficulties: No    Physical Impairment: None    Living Will: See Patient Demographics    Functional assessment:  Able to do all ADL's (including dressing, feeding, toileting, grooming, bathing, and ambulating).  Fall Risk: Low  Energy level: Good  Mental well-being: Good    HEIDS Measure screening dates:  BMI: See Vital signs      DEXA:               See Health Maintenance Report  colon screen:    See Health Maintenance Report      Mammogram:   See Health Maintenance Report                   Glaucoma screen:  per Optho                    Vaccines: See Health Maintenance Report  Flu:            Pneumovax:  Shingrix:           The patient's current health status is: Good   Patient was educated on all medical problems. See A/P from note dated today.    A written screening and health advice schedule was printed for the patient under Patient Instructions.    Screening: The patient was screened for depression with the PHQ2 questionnaire and possible health consequences were discussed with the patient, who understands (15 minutes spent). The patient was screened for the misuse of alcohol, by asking the number of drinks per average week, and if pt has had more than 4 drinks (more than 3 for women and elderly) in 1 day within the past year. The health and legal consequences of misuse were discussed (15 minutes spent). The patient was screened for obesity (BMI>30), If the current BMI > 30, then the possible consequences of obesity, as well as the benefits of diet,  exercise, and weight loss were discussed. Any behavioral risks were identified, and methods to achieve appropriate treatment goals were discussed (15 minutes spent).                                 Hypertension  This is a chronic problem. The current episode started more than 1 year ago. The problem is controlled.     Review of Systems   Constitutional: Negative.    Respiratory: Negative.    Cardiovascular: Negative.    Musculoskeletal: Positive for arthralgias.   Psychiatric/Behavioral: Negative for dysphoric mood.         Objective:      Physical Exam  Constitutional:       Appearance: He is well-developed.   HENT:      Head: Normocephalic and atraumatic.   Eyes:      Pupils: Pupils are equal, round, and reactive to light.   Cardiovascular:      Rate and Rhythm: Normal rate and regular rhythm.      Heart sounds: Murmur present. Crescendo  decrescendo  systolic murmur present with a grade of 2/6.      Comments: @RUSB  Pulmonary:      Effort: Pulmonary effort is normal.   Musculoskeletal:      Comments: 1+ edema bilateral.   Neurological:      Mental Status: He is alert.         Assessment:       1. High cholesterol    2. Idiopathic chronic gout of multiple sites without tophus    3. Essential hypertension    4. Obesity (BMI 30.0-34.9)    5. Routine adult health maintenance    6. Screening for alcoholism    7. Screening for obesity    8. Screening for depression        Plan:       Per orders and D/C instructions.  Continue meds/diet for gout, HTN, and high cholesterol, which are stable.     Check labs.    Per orders and D/C instructions.  Continue meds/diet for DM, HTN, and high cholesterol, which are stable.

## 2020-09-09 LAB
CHOLEST SERPL-MCNC: 178 MG/DL (ref 120–199)
CHOLEST/HDLC SERPL: 3.1 {RATIO} (ref 2–5)
ESTIMATED AVG GLUCOSE: 111 MG/DL (ref 68–131)
HBA1C MFR BLD HPLC: 5.5 % (ref 4–5.6)
HCV AB SERPL QL IA: NEGATIVE
HDLC SERPL-MCNC: 58 MG/DL (ref 40–75)
HDLC SERPL: 32.6 % (ref 20–50)
LDLC SERPL CALC-MCNC: 101.2 MG/DL (ref 63–159)
NONHDLC SERPL-MCNC: 120 MG/DL
TRIGL SERPL-MCNC: 94 MG/DL (ref 30–150)
VIT B12 SERPL-MCNC: 429 PG/ML (ref 210–950)

## 2020-11-13 ENCOUNTER — DOCUMENTATION ONLY (OUTPATIENT)
Dept: INTERNAL MEDICINE | Facility: CLINIC | Age: 75
End: 2020-11-13
Payer: MEDICARE

## 2020-11-13 DIAGNOSIS — I10 ESSENTIAL HYPERTENSION: ICD-10-CM

## 2020-11-13 DIAGNOSIS — Z78.9 KNOWN MEDICAL PROBLEMS: ICD-10-CM

## 2020-11-13 DIAGNOSIS — E78.00 HIGH CHOLESTEROL: Primary | ICD-10-CM

## 2020-11-13 PROCEDURE — 99490 PR CHRONIC CARE MGMT, 1ST 20 MIN: ICD-10-PCS | Mod: S$GLB,,, | Performed by: INTERNAL MEDICINE

## 2020-11-13 PROCEDURE — 99490 CHRNC CARE MGMT STAFF 1ST 20: CPT | Mod: S$GLB,,, | Performed by: INTERNAL MEDICINE

## 2020-11-30 NOTE — PROGRESS NOTES
The patient's electronic chart was reviewed during this month. The patient's medical, functional, and psychosocial needs were assessed. Need for Home health care, PT, OT, , psychiatric care, and hospice was assessed. All preventative care measures were reviewed and updated. All medications were reviewed and reconciled. Potential drug interactions and medication adherence was reviewed. Prescriptions were renewed as appropriate. Education was provided to the patient and/or caregiver as needed, and all questions were answered. Over 20 minutes were spent providing these non-face-to-face services during this calendar month.       Refilled:    indomethacin (INDOCIN) 25 MG capsule 60 capsule 0 11/13/2020  No   Sig: TAKE 1 CAPSULE BY MOUTH TWICE A DAY AS NEEDED   Sent to pharmacy as: indomethacin (INDOCIN) 25 MG capsule   Class: Normal   Order: [id number]   Date/Time Signed: 11/13/2020 16:56       E-Prescribing Status: Receipt confirmed by pharmacy (11/13/2020  4:56 PM CST)       simvastatin (ZOCOR) 80 MG tablet 90 tablet 1 11/13/2020  No   Sig: TAKE 1 TABLET BY MOUTH EVERY EVENING   Sent to pharmacy as: simvastatin (ZOCOR) 80 MG tablet   Class: Normal   Order: [id number]   Date/Time Signed: 11/13/2020 16:56       E-Prescribing Status: Receipt confirmed by pharmacy (11/13/2020  4:56 PM CST)

## 2021-01-06 ENCOUNTER — IMMUNIZATION (OUTPATIENT)
Dept: INTERNAL MEDICINE | Facility: CLINIC | Age: 76
End: 2021-01-06
Payer: MEDICARE

## 2021-01-06 DIAGNOSIS — Z23 NEED FOR VACCINATION: ICD-10-CM

## 2021-01-06 PROCEDURE — 91300 COVID-19, MRNA, LNP-S, PF, 30 MCG/0.3 ML DOSE VACCINE: CPT | Mod: PBBFAC

## 2021-01-19 DIAGNOSIS — M1A.09X0 IDIOPATHIC CHRONIC GOUT OF MULTIPLE SITES WITHOUT TOPHUS: Primary | ICD-10-CM

## 2021-01-19 RX ORDER — COLCHICINE 0.6 MG/1
1 CAPSULE ORAL DAILY
Qty: 30 CAPSULE | Refills: 5 | Status: SHIPPED | OUTPATIENT
Start: 2021-01-19

## 2021-01-27 ENCOUNTER — IMMUNIZATION (OUTPATIENT)
Dept: INTERNAL MEDICINE | Facility: CLINIC | Age: 76
End: 2021-01-27
Payer: MEDICARE

## 2021-01-27 DIAGNOSIS — Z23 NEED FOR VACCINATION: Primary | ICD-10-CM

## 2021-01-27 PROCEDURE — 0002A COVID-19, MRNA, LNP-S, PF, 30 MCG/0.3 ML DOSE VACCINE: CPT | Mod: PBBFAC

## 2021-01-27 PROCEDURE — 91300 COVID-19, MRNA, LNP-S, PF, 30 MCG/0.3 ML DOSE VACCINE: CPT | Mod: PBBFAC

## 2021-02-15 ENCOUNTER — DOCUMENTATION ONLY (OUTPATIENT)
Dept: INTERNAL MEDICINE | Facility: CLINIC | Age: 76
End: 2021-02-15
Payer: MEDICARE

## 2021-02-15 DIAGNOSIS — I10 ESSENTIAL HYPERTENSION: Primary | ICD-10-CM

## 2021-02-15 DIAGNOSIS — E78.00 HIGH CHOLESTEROL: ICD-10-CM

## 2021-02-15 DIAGNOSIS — M17.0 PRIMARY OSTEOARTHRITIS OF BOTH KNEES: ICD-10-CM

## 2021-02-15 DIAGNOSIS — Z78.9 KNOWN MEDICAL PROBLEMS: ICD-10-CM

## 2021-02-15 PROCEDURE — 99490 PR CHRONIC CARE MGMT, 1ST 20 MIN: ICD-10-PCS | Mod: S$GLB,,, | Performed by: INTERNAL MEDICINE

## 2021-02-15 PROCEDURE — 99490 CHRNC CARE MGMT STAFF 1ST 20: CPT | Mod: S$GLB,,, | Performed by: INTERNAL MEDICINE

## 2021-03-08 ENCOUNTER — OFFICE VISIT (OUTPATIENT)
Dept: INTERNAL MEDICINE | Facility: CLINIC | Age: 76
End: 2021-03-08
Attending: INTERNAL MEDICINE
Payer: MEDICARE

## 2021-03-08 VITALS
BODY MASS INDEX: 32.34 KG/M2 | HEIGHT: 73 IN | OXYGEN SATURATION: 98 % | WEIGHT: 244 LBS | HEART RATE: 72 BPM | DIASTOLIC BLOOD PRESSURE: 70 MMHG | SYSTOLIC BLOOD PRESSURE: 110 MMHG

## 2021-03-08 DIAGNOSIS — M17.0 PRIMARY OSTEOARTHRITIS OF BOTH KNEES: ICD-10-CM

## 2021-03-08 DIAGNOSIS — M1A.09X0 IDIOPATHIC CHRONIC GOUT OF MULTIPLE SITES WITHOUT TOPHUS: ICD-10-CM

## 2021-03-08 DIAGNOSIS — I10 ESSENTIAL HYPERTENSION: ICD-10-CM

## 2021-03-08 DIAGNOSIS — E78.00 HIGH CHOLESTEROL: Primary | ICD-10-CM

## 2021-03-08 PROCEDURE — 99214 OFFICE O/P EST MOD 30 MIN: CPT | Mod: S$GLB,,, | Performed by: INTERNAL MEDICINE

## 2021-03-08 PROCEDURE — 99214 PR OFFICE/OUTPT VISIT, EST, LEVL IV, 30-39 MIN: ICD-10-PCS | Mod: S$GLB,,, | Performed by: INTERNAL MEDICINE

## 2021-03-15 ENCOUNTER — PATIENT MESSAGE (OUTPATIENT)
Dept: INTERNAL MEDICINE | Facility: CLINIC | Age: 76
End: 2021-03-15

## 2021-03-18 ENCOUNTER — PATIENT MESSAGE (OUTPATIENT)
Dept: RESEARCH | Facility: HOSPITAL | Age: 76
End: 2021-03-18

## 2021-03-26 ENCOUNTER — PATIENT MESSAGE (OUTPATIENT)
Dept: RESEARCH | Facility: HOSPITAL | Age: 76
End: 2021-03-26

## 2021-04-19 ENCOUNTER — DOCUMENTATION ONLY (OUTPATIENT)
Dept: INTERNAL MEDICINE | Facility: CLINIC | Age: 76
End: 2021-04-19
Payer: MEDICARE

## 2021-04-19 DIAGNOSIS — I10 ESSENTIAL HYPERTENSION: Primary | ICD-10-CM

## 2021-04-19 DIAGNOSIS — E03.9 HYPOTHYROIDISM, UNSPECIFIED TYPE: ICD-10-CM

## 2021-04-19 DIAGNOSIS — Z78.9 KNOWN MEDICAL PROBLEMS: ICD-10-CM

## 2021-04-19 DIAGNOSIS — K29.60 REFLUX GASTRITIS: Primary | ICD-10-CM

## 2021-04-19 DIAGNOSIS — M17.0 PRIMARY OSTEOARTHRITIS OF BOTH KNEES: ICD-10-CM

## 2021-04-19 PROCEDURE — 99490 CHRNC CARE MGMT STAFF 1ST 20: CPT | Mod: S$GLB,,, | Performed by: INTERNAL MEDICINE

## 2021-04-19 PROCEDURE — 99490 PR CHRONIC CARE MGMT, 1ST 20 MIN: ICD-10-PCS | Mod: S$GLB,,, | Performed by: INTERNAL MEDICINE

## 2021-04-19 RX ORDER — PANTOPRAZOLE SODIUM 40 MG/1
40 TABLET, DELAYED RELEASE ORAL DAILY
Qty: 30 TABLET | Refills: 0 | Status: SHIPPED | OUTPATIENT
Start: 2021-04-19 | End: 2021-05-13

## 2021-05-13 ENCOUNTER — DOCUMENTATION ONLY (OUTPATIENT)
Dept: INTERNAL MEDICINE | Facility: CLINIC | Age: 76
End: 2021-05-13
Payer: MEDICARE

## 2021-05-13 DIAGNOSIS — E78.00 HIGH CHOLESTEROL: ICD-10-CM

## 2021-05-13 DIAGNOSIS — M17.0 PRIMARY OSTEOARTHRITIS OF BOTH KNEES: ICD-10-CM

## 2021-05-13 DIAGNOSIS — I10 ESSENTIAL HYPERTENSION: Primary | ICD-10-CM

## 2021-05-13 DIAGNOSIS — Z78.9 KNOWN MEDICAL PROBLEMS: ICD-10-CM

## 2021-05-13 PROCEDURE — 99490 PR CHRONIC CARE MGMT, 1ST 20 MIN: ICD-10-PCS | Mod: S$GLB,,, | Performed by: INTERNAL MEDICINE

## 2021-05-13 PROCEDURE — 99490 CHRNC CARE MGMT STAFF 1ST 20: CPT | Mod: S$GLB,,, | Performed by: INTERNAL MEDICINE

## 2021-06-22 ENCOUNTER — PES CALL (OUTPATIENT)
Dept: ADMINISTRATIVE | Facility: CLINIC | Age: 76
End: 2021-06-22

## 2021-08-15 ENCOUNTER — CLINICAL SUPPORT (OUTPATIENT)
Dept: URGENT CARE | Facility: CLINIC | Age: 76
End: 2021-08-15
Payer: MEDICARE

## 2021-08-15 DIAGNOSIS — Z20.822 ENCOUNTER FOR LABORATORY TESTING FOR COVID-19 VIRUS: ICD-10-CM

## 2021-08-15 LAB
CTP QC/QA: YES
SARS-COV-2 RDRP RESP QL NAA+PROBE: NEGATIVE

## 2021-08-15 PROCEDURE — U0002 COVID-19 LAB TEST NON-CDC: HCPCS | Mod: QW,CR,S$GLB, | Performed by: NURSE PRACTITIONER

## 2021-08-15 PROCEDURE — 99211 OFF/OP EST MAY X REQ PHY/QHP: CPT | Mod: S$GLB,CS,, | Performed by: NURSE PRACTITIONER

## 2021-08-15 PROCEDURE — 99211 PR OFFICE/OUTPT VISIT, EST, LEVL I: ICD-10-PCS | Mod: S$GLB,CS,, | Performed by: NURSE PRACTITIONER

## 2021-08-15 PROCEDURE — U0002: ICD-10-PCS | Mod: QW,CR,S$GLB, | Performed by: NURSE PRACTITIONER

## 2021-09-09 ENCOUNTER — OFFICE VISIT (OUTPATIENT)
Dept: INTERNAL MEDICINE | Facility: CLINIC | Age: 76
End: 2021-09-09
Attending: INTERNAL MEDICINE
Payer: MEDICARE

## 2021-09-09 VITALS
BODY MASS INDEX: 32.74 KG/M2 | DIASTOLIC BLOOD PRESSURE: 64 MMHG | HEIGHT: 73 IN | OXYGEN SATURATION: 97 % | WEIGHT: 247 LBS | HEART RATE: 81 BPM | SYSTOLIC BLOOD PRESSURE: 112 MMHG

## 2021-09-09 DIAGNOSIS — M1A.09X0 IDIOPATHIC CHRONIC GOUT OF MULTIPLE SITES WITHOUT TOPHUS: ICD-10-CM

## 2021-09-09 DIAGNOSIS — E78.00 HIGH CHOLESTEROL: Primary | ICD-10-CM

## 2021-09-09 DIAGNOSIS — Z13.39 SCREENING FOR ALCOHOLISM: ICD-10-CM

## 2021-09-09 DIAGNOSIS — I10 ESSENTIAL HYPERTENSION: ICD-10-CM

## 2021-09-09 DIAGNOSIS — Z13.89 SCREENING FOR OBESITY: ICD-10-CM

## 2021-09-09 DIAGNOSIS — Z13.31 SCREENING FOR DEPRESSION: ICD-10-CM

## 2021-09-09 PROCEDURE — G0442 PR  ALCOHOL SCREENING: ICD-10-PCS | Mod: 59,S$GLB,, | Performed by: INTERNAL MEDICINE

## 2021-09-09 PROCEDURE — G0442 ANNUAL ALCOHOL SCREEN 15 MIN: HCPCS | Mod: 59,S$GLB,, | Performed by: INTERNAL MEDICINE

## 2021-09-09 PROCEDURE — G0444 PR DEPRESSION SCREENING: ICD-10-PCS | Mod: 59,S$GLB,, | Performed by: INTERNAL MEDICINE

## 2021-09-09 PROCEDURE — 99214 PR OFFICE/OUTPT VISIT, EST, LEVL IV, 30-39 MIN: ICD-10-PCS | Mod: 25,S$GLB,, | Performed by: INTERNAL MEDICINE

## 2021-09-09 PROCEDURE — G0447 PR OBESITY COUNSELING: ICD-10-PCS | Mod: 59,S$GLB,, | Performed by: INTERNAL MEDICINE

## 2021-09-09 PROCEDURE — G0447 BEHAVIOR COUNSEL OBESITY 15M: HCPCS | Mod: 59,S$GLB,, | Performed by: INTERNAL MEDICINE

## 2021-09-09 PROCEDURE — 99214 OFFICE O/P EST MOD 30 MIN: CPT | Mod: 25,S$GLB,, | Performed by: INTERNAL MEDICINE

## 2021-09-09 PROCEDURE — G0444 DEPRESSION SCREEN ANNUAL: HCPCS | Mod: 59,S$GLB,, | Performed by: INTERNAL MEDICINE

## 2021-09-27 ENCOUNTER — DOCUMENTATION ONLY (OUTPATIENT)
Dept: INTERNAL MEDICINE | Facility: CLINIC | Age: 76
End: 2021-09-27
Payer: MEDICARE

## 2021-09-27 DIAGNOSIS — Z78.9 KNOWN MEDICAL PROBLEMS: ICD-10-CM

## 2021-09-27 DIAGNOSIS — I10 ESSENTIAL HYPERTENSION: Primary | ICD-10-CM

## 2021-09-27 DIAGNOSIS — M17.0 PRIMARY OSTEOARTHRITIS OF BOTH KNEES: ICD-10-CM

## 2021-09-27 PROCEDURE — 99490 CHRNC CARE MGMT STAFF 1ST 20: CPT | Mod: S$GLB,,, | Performed by: INTERNAL MEDICINE

## 2021-09-27 PROCEDURE — 99490 PR CHRONIC CARE MGMT, 1ST 20 MIN: ICD-10-PCS | Mod: S$GLB,,, | Performed by: INTERNAL MEDICINE

## 2021-12-20 DIAGNOSIS — K29.60 REFLUX GASTRITIS: ICD-10-CM

## 2021-12-20 DIAGNOSIS — M17.0 PRIMARY OSTEOARTHRITIS OF BOTH KNEES: Primary | ICD-10-CM

## 2021-12-20 RX ORDER — CELECOXIB 200 MG/1
200 CAPSULE ORAL DAILY PRN
Qty: 10 CAPSULE | Refills: 0 | Status: SHIPPED | OUTPATIENT
Start: 2021-12-20 | End: 2022-06-27 | Stop reason: SDUPTHER

## 2021-12-20 RX ORDER — PANTOPRAZOLE SODIUM 40 MG/1
40 TABLET, DELAYED RELEASE ORAL DAILY
Qty: 10 TABLET | Refills: 0 | Status: SHIPPED | OUTPATIENT
Start: 2021-12-20 | End: 2022-06-27 | Stop reason: SDUPTHER

## 2022-01-25 DIAGNOSIS — I10 ESSENTIAL HYPERTENSION: ICD-10-CM

## 2022-01-25 DIAGNOSIS — E78.00 HIGH CHOLESTEROL: Primary | ICD-10-CM

## 2022-01-25 RX ORDER — SIMVASTATIN 80 MG/1
80 TABLET, FILM COATED ORAL NIGHTLY
Qty: 90 TABLET | Refills: 1 | Status: SHIPPED | OUTPATIENT
Start: 2022-01-25 | End: 2023-05-05 | Stop reason: SDUPTHER

## 2022-01-25 RX ORDER — IRBESARTAN 300 MG/1
300 TABLET ORAL DAILY
Qty: 90 TABLET | Refills: 1 | Status: SHIPPED | OUTPATIENT
Start: 2022-01-25 | End: 2022-07-22

## 2022-03-08 ENCOUNTER — OFFICE VISIT (OUTPATIENT)
Dept: INTERNAL MEDICINE | Facility: CLINIC | Age: 77
End: 2022-03-08
Attending: INTERNAL MEDICINE
Payer: MEDICARE

## 2022-03-08 VITALS
HEIGHT: 73 IN | DIASTOLIC BLOOD PRESSURE: 80 MMHG | OXYGEN SATURATION: 97 % | WEIGHT: 252 LBS | BODY MASS INDEX: 33.4 KG/M2 | SYSTOLIC BLOOD PRESSURE: 112 MMHG | HEART RATE: 66 BPM

## 2022-03-08 DIAGNOSIS — I10 ESSENTIAL HYPERTENSION: ICD-10-CM

## 2022-03-08 DIAGNOSIS — E66.9 OBESITY (BMI 30.0-34.9): ICD-10-CM

## 2022-03-08 DIAGNOSIS — M1A.09X0 IDIOPATHIC CHRONIC GOUT OF MULTIPLE SITES WITHOUT TOPHUS: ICD-10-CM

## 2022-03-08 DIAGNOSIS — Z13.31 SCREENING FOR DEPRESSION: ICD-10-CM

## 2022-03-08 DIAGNOSIS — E78.00 HIGH CHOLESTEROL: Primary | ICD-10-CM

## 2022-03-08 DIAGNOSIS — Z13.89 SCREENING FOR OBESITY: ICD-10-CM

## 2022-03-08 DIAGNOSIS — M17.0 PRIMARY OSTEOARTHRITIS OF BOTH KNEES: ICD-10-CM

## 2022-03-08 DIAGNOSIS — Z13.39 SCREENING FOR ALCOHOLISM: ICD-10-CM

## 2022-03-08 PROCEDURE — G0447 BEHAVIOR COUNSEL OBESITY 15M: HCPCS | Mod: 59,S$GLB,, | Performed by: INTERNAL MEDICINE

## 2022-03-08 PROCEDURE — 99214 OFFICE O/P EST MOD 30 MIN: CPT | Mod: 25,S$GLB,, | Performed by: INTERNAL MEDICINE

## 2022-03-08 PROCEDURE — G0442 ANNUAL ALCOHOL SCREEN 15 MIN: HCPCS | Mod: 59,S$GLB,, | Performed by: INTERNAL MEDICINE

## 2022-03-08 PROCEDURE — G0442 PR  ALCOHOL SCREENING: ICD-10-PCS | Mod: 59,S$GLB,, | Performed by: INTERNAL MEDICINE

## 2022-03-08 PROCEDURE — G0447 PR OBESITY COUNSELING: ICD-10-PCS | Mod: 59,S$GLB,, | Performed by: INTERNAL MEDICINE

## 2022-03-08 PROCEDURE — G0444 DEPRESSION SCREEN ANNUAL: HCPCS | Mod: 59,S$GLB,, | Performed by: INTERNAL MEDICINE

## 2022-03-08 PROCEDURE — 99214 PR OFFICE/OUTPT VISIT, EST, LEVL IV, 30-39 MIN: ICD-10-PCS | Mod: 25,S$GLB,, | Performed by: INTERNAL MEDICINE

## 2022-03-08 PROCEDURE — G0444 PR DEPRESSION SCREENING: ICD-10-PCS | Mod: 59,S$GLB,, | Performed by: INTERNAL MEDICINE

## 2022-03-09 NOTE — PROGRESS NOTES
Subjective:       Patient ID: Av Dorantes is a 76 y.o. male.    Chief Complaint: Follow-up (Discuss fitness routine)    He would like to start exercising and walk up to 2 miles per day eventually.    Hypertension  This is a chronic problem. The current episode started more than 1 year ago. The problem is controlled.     Review of Systems   Constitutional: Negative.    Respiratory: Negative.    Cardiovascular: Negative.    Musculoskeletal: Positive for arthralgias.         Objective:      Physical Exam  Constitutional:       Appearance: He is well-developed.   HENT:      Head: Normocephalic and atraumatic.   Eyes:      Pupils: Pupils are equal, round, and reactive to light.   Cardiovascular:      Rate and Rhythm: Normal rate and regular rhythm.      Heart sounds: Murmur heard.    Crescendo decrescendo systolic murmur is present with a grade of 2/6.     Comments: @RUSB  Pulmonary:      Effort: Pulmonary effort is normal.   Musculoskeletal:      Comments: 1+ edema bilateral.   Neurological:      Mental Status: He is alert.         Assessment:       Problem List Items Addressed This Visit        Unprioritized    Primary osteoarthritis of both knees    Obesity (BMI 30.0-34.9)    High cholesterol - Primary    Essential hypertension    Chronic idiopathic gout of multiple sites      Other Visit Diagnoses     Screening for depression        Screening for alcoholism        Screening for obesity              Plan:       Per orders and D/C instructions.  Continue diet and/or meds for gout, HTN, and high cholesterol, which are stable.     follow-up with ortho for right knee pain.  Slowly increase exercise up to 2 miles per day.  [facility]    Screening: The patient was screened for depression with the PHQ2 questionnaire and possible health consequences were discussed with the patient, who understands (15 minutes spent).       The patient was screened for the misuse of alcohol, by asking the number of  drinks per average week, and if pt has had more than 4 drinks (more than 3 for women and elderly) in 1 day within the past year. The health and legal consequences of misuse were discussed (15 minutes spent).       The patient was screened for obesity (BMI>30), Since the current BMI is > 30, the possible consequences of obesity, as well as the benefits of diet, exercise, and weight loss were discussed. Any behavioral risks were identified, and methods to achieve appropriate treatment goals were discussed (15 minutes spent).

## 2022-04-18 DIAGNOSIS — E87.6 HYPOPOTASSEMIA: ICD-10-CM

## 2022-04-18 RX ORDER — POTASSIUM CITRATE 10 MEQ/1
10 TABLET, EXTENDED RELEASE ORAL
Qty: 20 TABLET | Refills: 0 | Status: SHIPPED | OUTPATIENT
Start: 2022-04-18 | End: 2024-03-12 | Stop reason: ALTCHOICE

## 2022-06-16 ENCOUNTER — OFFICE VISIT (OUTPATIENT)
Dept: CARDIOLOGY | Facility: CLINIC | Age: 77
End: 2022-06-16
Payer: OTHER GOVERNMENT

## 2022-06-16 VITALS
HEART RATE: 71 BPM | HEIGHT: 73 IN | SYSTOLIC BLOOD PRESSURE: 118 MMHG | WEIGHT: 256 LBS | OXYGEN SATURATION: 97 % | BODY MASS INDEX: 33.93 KG/M2 | DIASTOLIC BLOOD PRESSURE: 66 MMHG

## 2022-06-16 DIAGNOSIS — I35.0 NONRHEUMATIC AORTIC VALVE STENOSIS: Primary | ICD-10-CM

## 2022-06-16 DIAGNOSIS — I50.22 CHRONIC SYSTOLIC HEART FAILURE: ICD-10-CM

## 2022-06-16 PROCEDURE — 93005 ELECTROCARDIOGRAM TRACING: CPT

## 2022-06-16 PROCEDURE — 99205 OFFICE O/P NEW HI 60 MIN: CPT | Mod: S$PBB,,, | Performed by: INTERNAL MEDICINE

## 2022-06-16 PROCEDURE — 93010 EKG 12-LEAD: ICD-10-PCS | Mod: ,,, | Performed by: INTERNAL MEDICINE

## 2022-06-16 PROCEDURE — 99205 PR OFFICE/OUTPT VISIT, NEW, LEVL V, 60-74 MIN: ICD-10-PCS | Mod: S$PBB,,, | Performed by: INTERNAL MEDICINE

## 2022-06-16 PROCEDURE — 99214 OFFICE O/P EST MOD 30 MIN: CPT | Mod: PBBFAC | Performed by: INTERNAL MEDICINE

## 2022-06-16 PROCEDURE — 99999 PR PBB SHADOW E&M-EST. PATIENT-LVL IV: ICD-10-PCS | Mod: PBBFAC,,, | Performed by: INTERNAL MEDICINE

## 2022-06-16 PROCEDURE — 99999 PR PBB SHADOW E&M-EST. PATIENT-LVL IV: CPT | Mod: PBBFAC,,, | Performed by: INTERNAL MEDICINE

## 2022-06-16 PROCEDURE — 93010 ELECTROCARDIOGRAM REPORT: CPT | Mod: ,,, | Performed by: INTERNAL MEDICINE

## 2022-06-16 NOTE — PROGRESS NOTES
[facility]    Chief Complaint  Chief Complaint   Patient presents with    Valvular Heart Disease       HPI:    Referred for evaluation of aortic stenosis.  [facility] because of a murmur.  Denies prior cardiac problems.  I do not have echocardiogram report referral papers [facility] the [facility] suggest an ejection fraction of 35-40% and mild aortic stenosis.  He does note decreased exercise tolerance.  Got winded walking up half of the parking garage ramp.  No associated chest tightness.  No paroxysmal nocturnal dyspnea orthopnea.  No syncope or presyncope.    Medications  Current Outpatient Medications   Medication Sig Dispense Refill    celecoxib (CELEBREX) 200 MG capsule Take 1 capsule (200 mg total) by mouth daily as needed for Pain. 10 capsule 0    cholecalciferol, vitamin D3, (VITAMIN D3) 25 mcg (1,000 unit) capsule Take 1,000 Units by mouth once daily.      colchicine (MITIGARE) 0.6 mg Cap Take 1 capsule (0.6 mg total) by mouth once daily. 30 capsule 5    febuxostat (ULORIC) 80 mg Tab TAKE 1 TABLET DAILY 90 tablet 3    indomethacin (INDOCIN) 25 MG capsule TAKE 1 CAPSULE BY MOUTH TWICE A DAY AS NEEDED 60 capsule 0    irbesartan (AVAPRO) 300 MG tablet Take 1 tablet (300 mg total) by mouth once daily. 90 tablet 1    pantoprazole (PROTONIX) 40 MG tablet Take 1 tablet (40 mg total) by mouth once daily. 10 tablet 0    potassium citrate (UROCIT-K) 10 mEq (1,080 mg) TbSR Take 1 tablet (10 mEq total) by mouth 3 (three) times daily with meals. 20 tablet 0    simvastatin (ZOCOR) 80 MG tablet Take 1 tablet (80 mg total) by mouth every evening. 90 tablet 1    aspirin 162 MG TbEC Take 162 mg by mouth once daily.         No current facility-administered medications for this visit.        History  Past Medical History:   Diagnosis Date    Abnormal stress echo 10/12/2015    9/15/2015: Stress Echo: 6:00 min. No CP. ECG negative but frequent VPCs. Echo negative.     Family  history of premature CAD 2013    Gout, unspecified 2013    High cholesterol 2013    HTN (hypertension) 2013    Hypertension, benign 2013    1995: Diagnosed.     Normal cardiac stress test 2013    Ureteral stone 2013     Past Surgical History:   Procedure Laterality Date    ADENOIDECTOMY      TONSILLECTOMY       Social History     Socioeconomic History    Marital status:     Number of children: 2   Occupational History    Occupation: [employer]   Tobacco Use    Smoking status: Former Smoker     Packs/day: 2.00     Years: 30.00     Pack years: 60.00     Start date: 1962     Quit date: 1992     Years since quittin.4    Smokeless tobacco: Never Used   Substance and Sexual Activity    Alcohol use: Yes     Alcohol/week: 15.0 standard drinks     Types: 15 Shots of liquor per week    Drug use: No    Sexual activity: Not Currently   Social History Narrative    Walks 30 min 3x/wks. Weights 2-3/wk.    Goes to [place] every July.    2019 - retired.     Family History   Problem Relation Age of Onset    Cancer Mother     Heart disease Father 51        MI    Diabetes Paternal Grandfather         Allergies  Review of patient's allergies indicates:  No Known Allergies    Review of Systems   Review of Systems   Constitutional: Negative for malaise/fatigue, weight gain and weight loss.   Eyes: Negative for visual disturbance.   Cardiovascular: Positive for dyspnea on exertion. Negative for chest pain, claudication, cyanosis, irregular heartbeat, leg swelling, near-syncope, orthopnea, palpitations, paroxysmal nocturnal dyspnea and syncope.   Respiratory: Negative for cough, hemoptysis, shortness of breath, sleep disturbances due to breathing and wheezing.    Hematologic/Lymphatic: Negative for bleeding problem. Does not bruise/bleed easily.   Skin: Negative for poor wound healing.   Musculoskeletal: Negative for muscle cramps and myalgias.   Gastrointestinal:  Negative for abdominal pain, anorexia, diarrhea, heartburn, hematemesis, hematochezia, melena, nausea and vomiting.   Genitourinary: Negative for hematuria and nocturia.   Neurological: Negative for excessive daytime sleepiness, dizziness, focal weakness, light-headedness and weakness.       Physical Exam  Vitals:    06/16/22 1427   BP: 118/66   Pulse: 71     Wt Readings from Last 1 Encounters:   06/16/22 116.1 kg (256 lb)     Physical Exam  Vitals and nursing note reviewed.   Constitutional:       General: He is not in acute distress.     Appearance: He is not toxic-appearing or diaphoretic.   HENT:      Head: Normocephalic and atraumatic.      Mouth/Throat:      Lips: Pink.      Mouth: Mucous membranes are moist.   Eyes:      General: No scleral icterus.     Conjunctiva/sclera: Conjunctivae normal.   Neck:      Thyroid: No thyromegaly.      Vascular: No carotid bruit, hepatojugular reflux or JVD.      Trachea: Trachea normal.   Cardiovascular:      Rate and Rhythm: Normal rate and regular rhythm.  No extrasystoles are present.     Chest Wall: PMI is not displaced.      Pulses:           Carotid pulses are 2+ on the right side and 2+ on the left side.       Radial pulses are 2+ on the right side and 2+ on the left side.        Posterior tibial pulses are 2+ on the right side and 2+ on the left side.      Heart sounds: S1 normal and S2 normal. Murmur heard.    Harsh midsystolic murmur is present with a grade of 2/6 at the upper right sternal border radiating to the neck.    No friction rub. No S3 or S4 sounds.   Pulmonary:      Effort: Pulmonary effort is normal. No tachypnea, bradypnea, accessory muscle usage or respiratory distress.      Breath sounds: Normal breath sounds and air entry. No decreased breath sounds, wheezing, rhonchi or rales.   Abdominal:      General: Bowel sounds are normal. There is no distension or abdominal bruit.      Palpations: Abdomen is soft. There is no hepatomegaly, splenomegaly or  pulsatile mass.      Tenderness: There is no abdominal tenderness.   Musculoskeletal:         General: No tenderness or deformity.      Right lower le+ Edema (ankle) present.      Left lower le+ Edema (ankle) present.   Skin:     General: Skin is warm and dry.      Capillary Refill: Capillary refill takes less than 2 seconds.      Coloration: Skin is not cyanotic or pale.      Nails: There is no clubbing.   Neurological:      General: No focal deficit present.      Mental Status: He is alert and oriented to person, place, and time.   Psychiatric:         Attention and Perception: Attention normal.         Mood and Affect: Mood normal.         Speech: Speech normal.         Behavior: Behavior normal. Behavior is cooperative.         Labs  No visits with results within 6 Month(s) from this visit.   Latest known visit with results is:   Clinical Support on 08/15/2021   Component Date Value Ref Range Status    POC Rapid COVID 08/15/2021 Negative  Negative Final     Acceptable 08/15/2021 Yes   Final       Imaging  No results found.    Assessment  1. Nonrheumatic aortic valve stenosis  Mild?  - Echo; Future    2. Chronic systolic heart failure  Seems compensated  - Echo; Future      Plan and Discussion    We discussed aortic stenosis.  We discussed its natural history.  We discussed indications for valve replacement.  We discussed methods of valve replacement.  By my exam, I suspect he has at least mild aortic stenosis.  Will repeat his echocardiogram to better assess for low flow/low gradient aortic stenosis.  Further planning based on those results.    The 10-year ASCVD risk score (Charlott[place]agan CARIAS Jr., et al., 2013) is: 28%    Values used to calculate the score:      Age: 76 years      Sex: Male      Is Non- : No      Diabetic: No      Tobacco smoker: No      Systolic Blood Pressure: 118 mmHg      Is BP treated: Yes      HDL Cholesterol: 58 mg/dL      Total Cholesterol: 178 mg/dL      Follow Up  Follow up in about 4 weeks (around 7/14/2022).      Alberto Montes MD

## 2022-06-27 ENCOUNTER — DOCUMENTATION ONLY (OUTPATIENT)
Dept: INTERNAL MEDICINE | Facility: CLINIC | Age: 77
End: 2022-06-27
Payer: OTHER GOVERNMENT

## 2022-06-27 DIAGNOSIS — Z78.9 KNOWN MEDICAL PROBLEMS: ICD-10-CM

## 2022-06-27 DIAGNOSIS — K29.60 REFLUX GASTRITIS: ICD-10-CM

## 2022-06-27 DIAGNOSIS — M17.0 PRIMARY OSTEOARTHRITIS OF BOTH KNEES: ICD-10-CM

## 2022-06-27 DIAGNOSIS — I10 ESSENTIAL HYPERTENSION: Primary | ICD-10-CM

## 2022-06-27 PROCEDURE — 99490 CHRNC CARE MGMT STAFF 1ST 20: CPT | Mod: S$GLB,,, | Performed by: INTERNAL MEDICINE

## 2022-06-27 PROCEDURE — 99490 PR CHRONIC CARE MGMT, 1ST 20 MIN: ICD-10-PCS | Mod: S$GLB,,, | Performed by: INTERNAL MEDICINE

## 2022-06-27 RX ORDER — PANTOPRAZOLE SODIUM 40 MG/1
40 TABLET, DELAYED RELEASE ORAL DAILY
Qty: 10 TABLET | Refills: 0 | Status: SHIPPED | OUTPATIENT
Start: 2022-06-27 | End: 2022-10-06 | Stop reason: SDUPTHER

## 2022-06-27 RX ORDER — CELECOXIB 200 MG/1
200 CAPSULE ORAL DAILY PRN
Qty: 10 CAPSULE | Refills: 0 | Status: SHIPPED | OUTPATIENT
Start: 2022-06-27 | End: 2022-10-06 | Stop reason: SDUPTHER

## 2022-06-30 NOTE — PROGRESS NOTES
The patient's electronic chart was reviewed during this month. The patient's medical, functional, and psychosocial needs were assessed. Need for Home health care, PT, OT, , psychiatric care, and hospice was assessed. All preventative care measures were reviewed and updated. All medications were reviewed and reconciled. Potential drug interactions and medication adherence was reviewed. Prescriptions were renewed as appropriate. Education was provided to the patient and/or caregiver as needed, and all questions were answered. Over 20 minutes were spent providing these non-face-to-face services during this calendar month.     Refilled    pantoprazole (PROTONIX) 40 MG tablet 10 tablet 0 6/27/2022  No   Sig - Route: Take 1 tablet (40 mg total) by mouth once daily. - Oral   Sent to pharmacy as: pantoprazole (PROTONIX) 40 MG tablet   Class: Normal   Order: [id number]   Date/Time Signed: 6/27/2022 15:11       E-Prescribing Status: Receipt confirmed by pharmacy (6/27/2022  3:12 PM CDT)     celecoxib (CELEBREX) 200 MG capsule 10 capsule 0 6/27/2022  No   Sig - Route: Take 1 capsule (200 mg total) by mouth daily as needed for Pain. - Oral   Sent to pharmacy as: celecoxib (CELEBREX) 200 MG capsule   Class: Normal   Order: [id number]   Date/Time Signed: 6/27/2022 15:11       E-Prescribing Status: Receipt confirmed by pharmacy (6/27/2022  3:12 PM CDT)

## 2022-07-18 ENCOUNTER — HOSPITAL ENCOUNTER (OUTPATIENT)
Dept: CARDIOLOGY | Facility: OTHER | Age: 77
Discharge: HOME | End: 2022-07-18
Attending: INTERNAL MEDICINE
Payer: OTHER GOVERNMENT

## 2022-07-18 VITALS
BODY MASS INDEX: 33.93 KG/M2 | WEIGHT: 256 LBS | HEIGHT: 73 IN | SYSTOLIC BLOOD PRESSURE: 118 MMHG | DIASTOLIC BLOOD PRESSURE: 66 MMHG

## 2022-07-18 DIAGNOSIS — I50.22 CHRONIC SYSTOLIC HEART FAILURE: ICD-10-CM

## 2022-07-18 DIAGNOSIS — I35.0 NONRHEUMATIC AORTIC VALVE STENOSIS: ICD-10-CM

## 2022-07-18 LAB
ASCENDING AORTA: 3.33 CM
AV INDEX (PROSTH): 0.41
AV MEAN GRADIENT: 13 MMHG
AV PEAK GRADIENT: 21 MMHG
AV VALVE AREA: 1.5 CM2
AV VELOCITY RATIO: 0.42
BSA FOR ECHO PROCEDURE: 2.45 M2
CV ECHO LV RWT: 0.44 CM
DOP CALC AO PEAK VEL: 2.31 M/S
DOP CALC AO VTI: 47.6 CM
DOP CALC LVOT AREA: 3.7 CM2
DOP CALC LVOT DIAMETER: 2.17 CM
DOP CALC LVOT PEAK VEL: 0.97 M/S
DOP CALC LVOT STROKE VOLUME: 71.53 CM3
DOP CALCLVOT PEAK VEL VTI: 19.35 CM
E WAVE DECELERATION TIME: 272.85 MSEC
E/A RATIO: 0.45
E/E' RATIO: 10.91 M/S
ECHO LV POSTERIOR WALL: 0.94 CM (ref 0.6–1.1)
EJECTION FRACTION: 50 %
FRACTIONAL SHORTENING: 29 % (ref 28–44)
INTERVENTRICULAR SEPTUM: 1.05 CM (ref 0.6–1.1)
LA MAJOR: 6.76 CM
LA MINOR: 7.09 CM
LA WIDTH: 4.7 CM
LEFT ATRIUM SIZE: 4.15 CM
LEFT ATRIUM VOLUME INDEX MOD: 25.1 ML/M2
LEFT ATRIUM VOLUME INDEX: 48 ML/M2
LEFT ATRIUM VOLUME MOD: 60 CM3
LEFT ATRIUM VOLUME: 114.75 CM3
LEFT INTERNAL DIMENSION IN SYSTOLE: 3 CM (ref 2.1–4)
LEFT VENTRICLE DIASTOLIC VOLUME INDEX: 33.58 ML/M2
LEFT VENTRICLE DIASTOLIC VOLUME: 80.26 ML
LEFT VENTRICLE MASS INDEX: 58 G/M2
LEFT VENTRICLE SYSTOLIC VOLUME INDEX: 14.6 ML/M2
LEFT VENTRICLE SYSTOLIC VOLUME: 34.86 ML
LEFT VENTRICULAR INTERNAL DIMENSION IN DIASTOLE: 4.24 CM (ref 3.5–6)
LEFT VENTRICULAR MASS: 138.36 G
LV LATERAL E/E' RATIO: 10 M/S
LV SEPTAL E/E' RATIO: 12 M/S
MV A" WAVE DURATION": 10.66 MSEC
MV PEAK A VEL: 1.32 M/S
MV PEAK E VEL: 0.6 M/S
MV STENOSIS PRESSURE HALF TIME: 79.13 MS
MV VALVE AREA P 1/2 METHOD: 2.78 CM2
PISA MRMAX VEL: 0.04 M/S
PISA TR MAX VEL: 2.43 M/S
PULM VEIN S/D RATIO: 1.35
PV PEAK D VEL: 0.34 M/S
PV PEAK S VEL: 0.46 M/S
PV PEAK VELOCITY: 0.96 CM/S
RA MAJOR: 5.09 CM
RA PRESSURE: 3 MMHG
RA WIDTH: 2.7 CM
SINUS: 3.27 CM
STJ: 3.22 CM
TDI LATERAL: 0.06 M/S
TDI SEPTAL: 0.05 M/S
TDI: 0.06 M/S
TR MAX PG: 24 MMHG
TRICUSPID ANNULAR PLANE SYSTOLIC EXCURSION: 1.75 CM
TV REST PULMONARY ARTERY PRESSURE: 27 MMHG

## 2022-07-18 PROCEDURE — 93306 TTE W/DOPPLER COMPLETE: CPT

## 2022-07-18 PROCEDURE — 93306 ECHO (CUPID ONLY): ICD-10-PCS | Mod: 26,,, | Performed by: INTERNAL MEDICINE

## 2022-07-18 PROCEDURE — 93306 TTE W/DOPPLER COMPLETE: CPT | Mod: 26,,, | Performed by: INTERNAL MEDICINE

## 2022-07-20 ENCOUNTER — OFFICE VISIT (OUTPATIENT)
Dept: CARDIOLOGY | Facility: CLINIC | Age: 77
End: 2022-07-20
Payer: OTHER GOVERNMENT

## 2022-07-20 VITALS
HEART RATE: 73 BPM | HEIGHT: 73 IN | SYSTOLIC BLOOD PRESSURE: 126 MMHG | BODY MASS INDEX: 33.55 KG/M2 | WEIGHT: 253.19 LBS | DIASTOLIC BLOOD PRESSURE: 74 MMHG

## 2022-07-20 DIAGNOSIS — I35.0 NONRHEUMATIC AORTIC VALVE STENOSIS: Primary | ICD-10-CM

## 2022-07-20 DIAGNOSIS — I10 ESSENTIAL HYPERTENSION: ICD-10-CM

## 2022-07-20 DIAGNOSIS — E78.00 HYPERCHOLESTEROLEMIA: ICD-10-CM

## 2022-07-20 PROCEDURE — 99213 OFFICE O/P EST LOW 20 MIN: CPT | Mod: S$PBB,ICN,, | Performed by: INTERNAL MEDICINE

## 2022-07-20 PROCEDURE — 99213 PR OFFICE/OUTPT VISIT, EST, LEVL III, 20-29 MIN: ICD-10-PCS | Mod: S$PBB,ICN,, | Performed by: INTERNAL MEDICINE

## 2022-07-20 PROCEDURE — 99214 OFFICE O/P EST MOD 30 MIN: CPT | Mod: PBBFAC | Performed by: INTERNAL MEDICINE

## 2022-07-20 PROCEDURE — 99999 PR PBB SHADOW E&M-EST. PATIENT-LVL IV: CPT | Mod: PBBFAC,,, | Performed by: INTERNAL MEDICINE

## 2022-07-20 PROCEDURE — 99999 PR PBB SHADOW E&M-EST. PATIENT-LVL IV: ICD-10-PCS | Mod: PBBFAC,,, | Performed by: INTERNAL MEDICINE

## 2022-07-20 NOTE — PROGRESS NOTES
[facility]    Chief Complaint  Chief Complaint   Patient presents with    Valvular Heart Disease       HPI:    Presents today with his wife to discuss test results.  Continues to exercise regularly.  No change in his symptoms.    Medications  Current Outpatient Medications   Medication Sig Dispense Refill    celecoxib (CELEBREX) 200 MG capsule Take 1 capsule (200 mg total) by mouth daily as needed for Pain. 10 capsule 0    cholecalciferol, vitamin D3, (VITAMIN D3) 25 mcg (1,000 unit) capsule Take 1,000 Units by mouth once daily.      colchicine (MITIGARE) 0.6 mg Cap Take 1 capsule (0.6 mg total) by mouth once daily. 30 capsule 5    febuxostat (ULORIC) 80 mg Tab TAKE 1 TABLET DAILY 90 tablet 3    indomethacin (INDOCIN) 25 MG capsule TAKE 1 CAPSULE BY MOUTH TWICE A DAY AS NEEDED 60 capsule 0    irbesartan (AVAPRO) 300 MG tablet Take 1 tablet (300 mg total) by mouth once daily. 90 tablet 1    pantoprazole (PROTONIX) 40 MG tablet Take 1 tablet (40 mg total) by mouth once daily. 10 tablet 0    potassium citrate (UROCIT-K) 10 mEq (1,080 mg) TbSR Take 1 tablet (10 mEq total) by mouth 3 (three) times daily with meals. 20 tablet 0    simvastatin (ZOCOR) 80 MG tablet Take 1 tablet (80 mg total) by mouth every evening. 90 tablet 1    aspirin 162 MG TbEC Take 162 mg by mouth once daily.         No current facility-administered medications for this visit.        History  Past Medical History:   Diagnosis Date    Abnormal stress echo 10/12/2015    9/15/2015: Stress Echo: 6:00 min. No CP. ECG negative but frequent VPCs. Echo negative.     Family history of premature CAD 01/22/2013    Gout, unspecified 01/22/2013    High cholesterol 01/22/2013    Hypertension, benign 01/22/2013    1995: Diagnosed.     Normal cardiac stress test 01/22/2013    Ureteral stone 01/22/2013     Past Surgical History:   Procedure Laterality Date    ADENOIDECTOMY      TONSILLECTOMY       Social History     Socioeconomic  History    Marital status:     Number of children: 2   Occupational History    Occupation: [employer]   Tobacco Use    Smoking status: Former Smoker     Packs/day: 2.00     Years: 30.00     Pack years: 60.00     Start date: 1962     Quit date: 1992     Years since quittin.5    Smokeless tobacco: Never Used   Substance and Sexual Activity    Alcohol use: Yes     Alcohol/week: 15.0 standard drinks     Types: 15 Shots of liquor per week    Drug use: No    Sexual activity: Not Currently   Social History Narrative    Walks 30 min 3x/wks. Weights 2-3/wk.    Goes to [place] every July.    2019 - retired.     Family History   Problem Relation Age of Onset    Cancer Mother     Heart disease Father 51        MI    Diabetes Paternal Grandfather         Allergies  Review of patient's allergies indicates:  No Known Allergies    Review of Systems   Review of Systems   Constitutional: Negative for malaise/fatigue, weight gain and weight loss.   Eyes: Negative for visual disturbance.   Cardiovascular: Positive for dyspnea on exertion. Negative for chest pain, claudication, cyanosis, irregular heartbeat, leg swelling, near-syncope, orthopnea, palpitations, paroxysmal nocturnal dyspnea and syncope.   Respiratory: Negative for cough, hemoptysis, shortness of breath, sleep disturbances due to breathing and wheezing.    Hematologic/Lymphatic: Negative for bleeding problem. Does not bruise/bleed easily.   Skin: Negative for poor wound healing.   Musculoskeletal: Negative for muscle cramps and myalgias.   Gastrointestinal: Negative for abdominal pain, anorexia, diarrhea, heartburn, hematemesis, hematochezia, melena, nausea and vomiting.   Genitourinary: Negative for hematuria and nocturia.   Neurological: Negative for excessive daytime sleepiness, dizziness, focal weakness, light-headedness and weakness.       Physical Exam  Vitals:    22 1351   BP: 126/74   Pulse: 73     Wt Readings from Last   Encounters:   22 114.8 kg (253 lb 3.2 oz)     Physical Exam  Vitals and nursing note reviewed.   Constitutional:       General: He is not in acute distress.     Appearance: He is not toxic-appearing or diaphoretic.   HENT:      Head: Normocephalic and atraumatic.      Mouth/Throat:      Lips: Pink.      Mouth: Mucous membranes are moist.   Eyes:      General: No scleral icterus.     Conjunctiva/sclera: Conjunctivae normal.   Neck:      Thyroid: No thyromegaly.      Vascular: No carotid bruit, hepatojugular reflux or JVD.      Trachea: Trachea normal.   Cardiovascular:      Rate and Rhythm: Normal rate and regular rhythm.  No extrasystoles are present.     Chest Wall: PMI is not displaced.      Pulses:           Carotid pulses are 2+ on the right side and 2+ on the left side.       Radial pulses are 2+ on the right side and 2+ on the left side.        Posterior tibial pulses are 2+ on the right side and 2+ on the left side.      Heart sounds: S1 normal and S2 normal. Murmur heard.    Harsh midsystolic murmur is present with a grade of 2/6 at the upper right sternal border radiating to the neck.    No friction rub. No S3 or S4 sounds.   Pulmonary:      Effort: Pulmonary effort is normal. No tachypnea, bradypnea, accessory muscle usage or respiratory distress.      Breath sounds: Normal breath sounds and air entry. No decreased breath sounds, wheezing, rhonchi or rales.   Abdominal:      General: Bowel sounds are normal. There is no distension or abdominal bruit.      Palpations: Abdomen is soft. There is no hepatomegaly, splenomegaly or pulsatile mass.      Tenderness: There is no abdominal tenderness.   Musculoskeletal:         General: No tenderness or deformity.      Right lower le+ Edema (ankle) present.      Left lower le+ Edema (ankle) present.   Skin:     General: Skin is warm and dry.      Capillary Refill: Capillary refill takes less than 2 seconds.      Coloration: Skin is not cyanotic or pale.  "     Nails: There is no clubbing.   Neurological:      General: No focal deficit present.      Mental Status: He is alert and oriented to person, place, and time.   Psychiatric:         Attention and Perception: Attention normal.         Mood and Affect: Mood normal.         Speech: Speech normal.         Behavior: Behavior normal. Behavior is cooperative.         Labs  Hospital Outpatient Visit on 07/18/2022   Component Date Value Ref Range Status    BSA 07/18/2022 2.45  m2 Final    TDI SEPTAL 07/18/2022 0.05  m/s Final    LV LATERAL E/E' RATIO 07/18/2022 10.00  m/s Final    LV SEPTAL E/E' RATIO 07/18/2022 12.00  m/s Final    LA WIDTH 07/18/2022 4.70  cm Final    Right Atrial Pressure (from IVC) 07/18/2022 3  mmHg Final    TDI LATERAL 07/18/2022 0.06  m/s Final    PV PEAK VELOCITY 07/18/2022 0.96  cm/s Final    LVIDd 07/18/2022 4.24  3.5 - 6.0 cm Final    IVS 07/18/2022 1.05  0.6 - 1.1 cm Final    Posterior Wall 07/18/2022 0.94  0.6 - 1.1 cm Final    LVIDs 07/18/2022 3.00  2.1 - 4.0 cm Final    FS 07/18/2022 29  28 - 44 % Final    LA volume 07/18/2022 114.75  cm3 Final    Sinus 07/18/2022 3.27  cm Final    STJ 07/18/2022 3.22  cm Final    Ascending aorta 07/18/2022 3.33  cm Final    LV mass 07/18/2022 138.36  g Final    LA size 07/18/2022 4.15  cm Final    TAPSE 07/18/2022 1.75  cm Final    Left Ventricle Relative Wall Thick* 07/18/2022 0.44  cm Final    AV mean gradient 07/18/2022 13  mmHg Final    AV valve area 07/18/2022 1.50  cm2 Final    AV Velocity Ratio 07/18/2022 0.42   Final    AV index (prosthetic) 07/18/2022 0.41   Final    MV valve area p 1/2 method 07/18/2022 2.78  cm2 Final    E/A ratio 07/18/2022 0.45   Final    Mean e' 07/18/2022 0.06  m/s Final    E wave deceleration time 07/18/2022 272.85  msec Final    MV "A" wave duration 07/18/2022 10.66  msec Final    Pulm vein S/D ratio 07/18/2022 1.35   Final    LVOT diameter 07/18/2022 2.17  cm Final    LVOT area 07/18/2022 " 3.7  cm2 Final    LVOT peak elmo 07/18/2022 0.97  m/s Final    LVOT peak VTI 07/18/2022 19.35  cm Final    Ao peak elmo 07/18/2022 2.31  m/s Final    Ao VTI 07/18/2022 47.60  cm Final    Mr max elmo 07/18/2022 0.04  m/s Final    LVOT stroke volume 07/18/2022 71.53  cm3 Final    AV peak gradient 07/18/2022 21  mmHg Final    TV rest pulmonary artery pressure 07/18/2022 27  mmHg Final    E/E' ratio 07/18/2022 10.91  m/s Final    MV Peak E Elmo 07/18/2022 0.60  m/s Final    TR Max Elmo 07/18/2022 2.43  m/s Final    MV stenosis pressure 1/2 time 07/18/2022 79.13  ms Final    MV Peak A Elmo 07/18/2022 1.32  m/s Final    PV Peak S Elmo 07/18/2022 0.46  m/s Final    PV Peak D Elmo 07/18/2022 0.34  m/s Final    LV Systolic Volume 07/18/2022 34.86  mL Final    LV Systolic Volume Index 07/18/2022 14.6  mL/m2 Final    LV Diastolic Volume 07/18/2022 80.26  mL Final    LV Diastolic Volume Index 07/18/2022 33.58  mL/m2 Final    LA Volume Index 07/18/2022 48.0  mL/m2 Final    LV Mass Index 07/18/2022 58  g/m2 Final    RA Major Axis 07/18/2022 5.09  cm Final    Left Atrium Minor Axis 07/18/2022 7.09  cm Final    Left Atrium Major Axis 07/18/2022 6.76  cm Final    Triscuspid Valve Regurgitation Pea* 07/18/2022 24  mmHg Final    LA Volume Index (Mod) 07/18/2022 25.1  mL/m2 Final    LA volume (mod) 07/18/2022 60.00  cm3 Final    RA Width 07/18/2022 2.70  cm Final    EF 07/18/2022 50  % Final       Imaging  Echo    Result Date: 7/18/2022  · The left ventricle is normal in size with concentric remodeling and low normal systolic function. · Severe left atrial enlargement. · Grade I left ventricular diastolic dysfunction. · Normal right ventricular size with normal right ventricular systolic function. · There is mild aortic valve stenosis.        Assessment  1. Nonrheumatic aortic valve stenosis  Mild with preserved left ventricular contractility.    2. Essential hypertension  Controlled    3. Hypercholesterolemia    Laron following      Plan and Discussion    Discussed mild aortic stenosis.  Discussed indications for valve replacement, none yet.  Discussed regular monitoring.    The 10-year ASCVD risk score (Joshmagan CARIAS Jr., et al., 2013) is: 30.9%    Values used to calculate the score:      Age: 76 years      Sex: Male      Is Non- : No      Diabetic: No      Tobacco smoker: No      Systolic Blood Pressure: 126 mmHg      Is BP treated: Yes      HDL Cholesterol: 58 mg/dL      Total Cholesterol: 178 mg/dL     Follow Up  Follow up in about 6 months (around 1/20/2023).      Alberto Montes MD

## 2022-07-22 ENCOUNTER — DOCUMENTATION ONLY (OUTPATIENT)
Dept: INTERNAL MEDICINE | Facility: CLINIC | Age: 77
End: 2022-07-22
Payer: OTHER GOVERNMENT

## 2022-07-22 DIAGNOSIS — M17.0 PRIMARY OSTEOARTHRITIS OF BOTH KNEES: ICD-10-CM

## 2022-07-22 DIAGNOSIS — I10 ESSENTIAL HYPERTENSION: Primary | ICD-10-CM

## 2022-07-22 DIAGNOSIS — Z78.9 KNOWN MEDICAL PROBLEMS: ICD-10-CM

## 2022-07-22 PROCEDURE — 99490 CHRNC CARE MGMT STAFF 1ST 20: CPT | Mod: S$GLB,,, | Performed by: INTERNAL MEDICINE

## 2022-07-22 PROCEDURE — 99490 PR CHRONIC CARE MGMT, 1ST 20 MIN: ICD-10-PCS | Mod: S$GLB,,, | Performed by: INTERNAL MEDICINE

## 2022-07-26 NOTE — PROGRESS NOTES
The patient's electronic chart was reviewed during this month. The patient's medical, functional, and psychosocial needs were assessed. Need for Home health care, PT, OT, , psychiatric care, and hospice was assessed. All preventative care measures were reviewed and updated. All medications were reviewed and reconciled. Potential drug interactions and medication adherence was reviewed. Prescriptions were renewed as appropriate. Education was provided to the patient and/or caregiver as needed, and all questions were answered. Over 20 minutes were spent providing these non-face-to-face services during this calendar month.     Refilled      irbesartan (AVAPRO) 300 MG tablet 90 tablet 1 7/22/2022  No   Sig: TAKE 1 TABLET BY MOUTH EVERY DAY   Sent to pharmacy as: irbesartan (AVAPRO) 300 MG tablet   Class: Normal   Order: [id number]   Date/Time Signed: 7/22/2022 09:05       E-Prescribing Status: Receipt confirmed by pharmacy (7/22/2022  9:05 AM CDT)

## 2022-10-06 DIAGNOSIS — M17.0 PRIMARY OSTEOARTHRITIS OF BOTH KNEES: ICD-10-CM

## 2022-10-06 DIAGNOSIS — K29.60 REFLUX GASTRITIS: ICD-10-CM

## 2022-10-06 RX ORDER — CELECOXIB 200 MG/1
200 CAPSULE ORAL DAILY PRN
Qty: 30 CAPSULE | Refills: 0 | Status: SHIPPED | OUTPATIENT
Start: 2022-10-06 | End: 2022-11-06

## 2022-10-06 RX ORDER — PANTOPRAZOLE SODIUM 40 MG/1
40 TABLET, DELAYED RELEASE ORAL DAILY PRN
Qty: 30 TABLET | Refills: 1 | Status: SHIPPED | OUTPATIENT
Start: 2022-10-06 | End: 2022-10-31

## 2022-12-19 ENCOUNTER — DOCUMENTATION ONLY (OUTPATIENT)
Dept: INTERNAL MEDICINE | Facility: CLINIC | Age: 77
End: 2022-12-19
Payer: OTHER GOVERNMENT

## 2022-12-19 DIAGNOSIS — U07.1 COVID-19: Primary | ICD-10-CM

## 2022-12-19 DIAGNOSIS — E78.00 HIGH CHOLESTEROL: ICD-10-CM

## 2022-12-19 DIAGNOSIS — I10 ESSENTIAL HYPERTENSION: Primary | ICD-10-CM

## 2022-12-19 DIAGNOSIS — Z78.9 KNOWN MEDICAL PROBLEMS: ICD-10-CM

## 2022-12-19 DIAGNOSIS — M17.0 PRIMARY OSTEOARTHRITIS OF BOTH KNEES: ICD-10-CM

## 2022-12-19 PROCEDURE — 99490 CHRNC CARE MGMT STAFF 1ST 20: CPT | Mod: S$GLB,,, | Performed by: INTERNAL MEDICINE

## 2022-12-19 PROCEDURE — 99490 PR CHRONIC CARE MGMT, 1ST 20 MIN: ICD-10-PCS | Mod: S$GLB,,, | Performed by: INTERNAL MEDICINE

## 2022-12-29 NOTE — PROGRESS NOTES
Quality 110: Preventive Care And Screening: Influenza Immunization: Influenza Immunization previously received during influenza season The patient's electronic chart was reviewed during this month. The patient's medical, functional, and psychosocial needs were assessed. Need for Home health care, PT, OT, , psychiatric care, and hospice was assessed. All preventative care measures were reviewed and updated. All medications were reviewed and reconciled. Potential drug interactions and medication adherence was reviewed. Prescriptions were renewed as appropriate. Education was provided to the patient and/or caregiver as needed, and all questions were answered. Over 20 minutes were spent providing these non-face-to-face services during this calendar month.     Refilled    celecoxib (CELEBREX) 200 MG capsule 30 capsule 0 12/5/2022  No   Sig: TAKE 1 CAPSULE BY MOUTH DAILY AS NEEDED FOR PAIN.   Sent to pharmacy as: celecoxib (CELEBREX) 200 MG capsule   Class: Normal   Order: [id number]   Date/Time Signed: 12/5/2022 08:59       E-Prescribing Status: Receipt confirmed by pharmacy (12/5/2022  8:59 AM CST)     fluticasone (VERAMYST) 27.5 mcg/actuation nasal spray 9.1 mL 0 12/19/2022  No   Sig - Route: 2 sprays by Nasal route once daily. - Nasal   Sent to pharmacy as: fluticasone (VERAMYST) 27.5 mcg/actuation nasal spray   Class: Normal   Order: [id number]   Date/Time Signed: 12/19/2022 16:14       E-Prescribing Status: Receipt confirmed by pharmacy (12/19/2022  4:14 PM CST)      Quality 226: Preventive Care And Screening: Tobacco Use: Screening And Cessation Intervention: Patient screened for tobacco and never smoked Quality 431: Preventive Care And Screening: Unhealthy Alcohol Use - Screening: Patient screened for unhealthy alcohol use using a single question and scores less than 2 times per year Detail Level: Detailed Quality 130: Documentation Of Current Medications In The Medical Record: Current Medications Documented Quality 111:Pneumonia Vaccination Status For Older Adults: Pneumococcal Vaccination Previously Received

## 2023-01-06 ENCOUNTER — DOCUMENTATION ONLY (OUTPATIENT)
Dept: INTERNAL MEDICINE | Facility: CLINIC | Age: 78
End: 2023-01-06
Payer: OTHER GOVERNMENT

## 2023-01-06 DIAGNOSIS — M17.0 PRIMARY OSTEOARTHRITIS OF BOTH KNEES: Primary | ICD-10-CM

## 2023-01-06 DIAGNOSIS — I10 ESSENTIAL HYPERTENSION: Primary | ICD-10-CM

## 2023-01-06 DIAGNOSIS — M17.0 PRIMARY OSTEOARTHRITIS OF BOTH KNEES: ICD-10-CM

## 2023-01-06 DIAGNOSIS — Z78.9 KNOWN MEDICAL PROBLEMS: ICD-10-CM

## 2023-01-06 PROCEDURE — 99490 CHRNC CARE MGMT STAFF 1ST 20: CPT | Mod: S$GLB,,, | Performed by: INTERNAL MEDICINE

## 2023-01-06 PROCEDURE — 99490 PR CHRONIC CARE MGMT, 1ST 20 MIN: ICD-10-PCS | Mod: S$GLB,,, | Performed by: INTERNAL MEDICINE

## 2023-01-06 RX ORDER — MELOXICAM 15 MG/1
15 TABLET ORAL DAILY PRN
Qty: 30 TABLET | Refills: 1 | Status: SHIPPED | OUTPATIENT
Start: 2023-01-06 | End: 2023-03-08

## 2023-01-23 NOTE — PROGRESS NOTES
The patient's electronic chart was reviewed during this month. The patient's medical, functional, and psychosocial needs were assessed. Need for Home health care, PT, OT, , psychiatric care, and hospice was assessed. All preventative care measures were reviewed and updated. All medications were reviewed and reconciled. Potential drug interactions and medication adherence was reviewed. Prescriptions were renewed as appropriate. Education was provided to the patient and/or caregiver as needed, and all questions were answered. Over 20 minutes were spent providing these non-face-to-face services during this calendar month.       Orders Placed This Encounter     Active    meloxicam (MOBIC) 15 MG tablet Ordered On: 01/06/2023

## 2023-01-25 ENCOUNTER — OFFICE VISIT (OUTPATIENT)
Dept: CARDIOLOGY | Facility: CLINIC | Age: 78
End: 2023-01-25
Payer: OTHER GOVERNMENT

## 2023-01-25 VITALS
SYSTOLIC BLOOD PRESSURE: 130 MMHG | DIASTOLIC BLOOD PRESSURE: 68 MMHG | WEIGHT: 249.13 LBS | HEART RATE: 78 BPM | BODY MASS INDEX: 32.86 KG/M2 | OXYGEN SATURATION: 95 %

## 2023-01-25 DIAGNOSIS — I10 PRIMARY HYPERTENSION: ICD-10-CM

## 2023-01-25 DIAGNOSIS — E78.00 HYPERCHOLESTEROLEMIA: ICD-10-CM

## 2023-01-25 DIAGNOSIS — I35.0 NONRHEUMATIC AORTIC VALVE STENOSIS: Primary | ICD-10-CM

## 2023-01-25 PROCEDURE — 99214 PR OFFICE/OUTPT VISIT, EST, LEVL IV, 30-39 MIN: ICD-10-PCS | Mod: S$PBB,,, | Performed by: INTERNAL MEDICINE

## 2023-01-25 PROCEDURE — 99999 PR PBB SHADOW E&M-EST. PATIENT-LVL III: CPT | Mod: PBBFAC,,, | Performed by: INTERNAL MEDICINE

## 2023-01-25 PROCEDURE — 99213 OFFICE O/P EST LOW 20 MIN: CPT | Mod: PBBFAC | Performed by: INTERNAL MEDICINE

## 2023-01-25 PROCEDURE — 99214 OFFICE O/P EST MOD 30 MIN: CPT | Mod: S$PBB,,, | Performed by: INTERNAL MEDICINE

## 2023-01-25 PROCEDURE — 99999 PR PBB SHADOW E&M-EST. PATIENT-LVL III: ICD-10-PCS | Mod: PBBFAC,,, | Performed by: INTERNAL MEDICINE

## 2023-01-25 NOTE — PROGRESS NOTES
[facility]    Chief Complaint  Chief Complaint   Patient presents with    Valvular Heart Disease       HPI:    No complaints.  Doing well.  Exercises 30-60 minutes every day.  No problems with decrease in exercise capacity.  No syncope.  No palpitations.  No angina.  No paroxysmal nocturnal dyspnea or orthopnea.  [facility]  Was recommended that he take metoprolol.  His blood pressure was good that day on his current regimen.    Medications  Current Outpatient Medications   Medication Sig Dispense Refill    cholecalciferol, vitamin D3, (VITAMIN D3) 25 mcg (1,000 unit) capsule Take 1,000 Units by mouth once daily.      colchicine (MITIGARE) 0.6 mg Cap Take 1 capsule (0.6 mg total) by mouth once daily. 30 capsule 5    febuxostat (ULORIC) 80 mg Tab TAKE 1 TABLET DAILY 90 tablet 1    fluticasone (VERAMYST) 27.5 mcg/actuation nasal spray 2 sprays by Nasal route once daily. 9.1 mL 0    indomethacin (INDOCIN) 25 MG capsule TAKE 1 CAPSULE BY MOUTH TWICE A DAY AS NEEDED 60 capsule 0    irbesartan (AVAPRO) 300 MG tablet TAKE 1 TABLET BY MOUTH EVERY DAY 90 tablet 1    meloxicam (MOBIC) 15 MG tablet Take 1 tablet (15 mg total) by mouth daily as needed for Pain. Take with food. 30 tablet 1    pantoprazole (PROTONIX) 40 MG tablet TAKE 1 TABLET BY MOUTH EVERY DAY AS NEEDED 30 tablet 1    potassium citrate (UROCIT-K) 10 mEq (1,080 mg) TbSR Take 1 tablet (10 mEq total) by mouth 3 (three) times daily with meals. 20 tablet 0    simvastatin (ZOCOR) 80 MG tablet Take 1 tablet (80 mg total) by mouth every evening. 90 tablet 1     No current facility-administered medications for this visit.        History  Past Medical History:   Diagnosis Date    Abnormal stress echo 10/12/2015    9/15/2015: Stress Echo: 6:00 min. No CP. ECG negative but frequent VPCs. Echo negative.     Aortic valve stenosis     Family history of premature CAD 01/22/2013    Gout, unspecified 01/22/2013    High cholesterol  2013    Hypertension, benign 2013    1995: Diagnosed.     Normal cardiac stress test 2013    Ureteral stone 2013     Past Surgical History:   Procedure Laterality Date    ADENOIDECTOMY      TONSILLECTOMY       Social History     Socioeconomic History    Marital status:     Number of children: 2   Occupational History    Occupation: [employer]   Tobacco Use    Smoking status: Former     Packs/day: 2.00     Years: 30.00     Pack years: 60.00     Types: Cigarettes     Start date: 1962     Quit date: 1992     Years since quittin.0    Smokeless tobacco: Never   Substance and Sexual Activity    Alcohol use: Yes     Alcohol/week: 15.0 standard drinks     Types: 15 Shots of liquor per week    Drug use: No    Sexual activity: Not Currently   Social History Narrative    Walks 30 min 3x/wks. Weights 2-3/wk.    Goes to [place] every July.    2019 - retired.     Family History   Problem Relation Age of Onset    Cancer Mother     Heart disease Father 51        MI    Diabetes Paternal Grandfather         Allergies  Review of patient's allergies indicates:  No Known Allergies    Review of Systems   Review of Systems   Constitutional: Negative for malaise/fatigue, weight gain and weight loss.   Eyes:  Negative for visual disturbance.   Cardiovascular:  Negative for chest pain, claudication, cyanosis, dyspnea on exertion, irregular heartbeat, leg swelling, near-syncope, orthopnea, palpitations, paroxysmal nocturnal dyspnea and syncope.   Respiratory:  Negative for cough, hemoptysis, shortness of breath, sleep disturbances due to breathing and wheezing.    Hematologic/Lymphatic: Negative for bleeding problem. Does not bruise/bleed easily.   Skin:  Negative for poor wound healing.   Musculoskeletal:  Negative for muscle cramps and myalgias.   Gastrointestinal:  Negative for abdominal pain, anorexia, diarrhea, heartburn, hematemesis, hematochezia, melena, nausea and vomiting.   Genitourinary:   Negative for hematuria and nocturia.   Neurological:  Negative for excessive daytime sleepiness, dizziness, focal weakness, light-headedness and weakness.     Physical Exam  Vitals:    01/25/23 1324   BP: 130/68   Pulse: 78     Wt Readings from Last 1 Encounters:   01/25/23 113 kg (249 lb 1.6 oz)     Physical Exam  Vitals and nursing note reviewed.   Constitutional:       General: He is not in acute distress.     Appearance: He is not toxic-appearing or diaphoretic.   HENT:      Head: Normocephalic and atraumatic.      Mouth/Throat:      Lips: Pink.      Mouth: Mucous membranes are moist.   Eyes:      General: No scleral icterus.     Conjunctiva/sclera: Conjunctivae normal.   Neck:      Thyroid: No thyromegaly.      Vascular: No carotid bruit, hepatojugular reflux or JVD.      Trachea: Trachea normal.   Cardiovascular:      Rate and Rhythm: Normal rate and regular rhythm. No extrasystoles are present.     Chest Wall: PMI is not displaced.      Pulses:           Carotid pulses are 2+ on the right side and 2+ on the left side.       Radial pulses are 2+ on the right side and 2+ on the left side.        Posterior tibial pulses are 2+ on the right side and 2+ on the left side.      Heart sounds: S1 normal and S2 normal. Murmur heard.   Harsh midsystolic murmur is present with a grade of 2/6 at the upper right sternal border radiating to the neck.     No friction rub. No S3 or S4 sounds.   Pulmonary:      Effort: Pulmonary effort is normal. No tachypnea, bradypnea, accessory muscle usage or respiratory distress.      Breath sounds: Normal breath sounds and air entry. No decreased breath sounds, wheezing, rhonchi or rales.   Abdominal:      General: Bowel sounds are normal. There is no distension or abdominal bruit.      Palpations: Abdomen is soft. There is no hepatomegaly, splenomegaly or pulsatile mass.      Tenderness: There is no abdominal tenderness.   Musculoskeletal:         General: No tenderness or deformity.       Right lower le+ Edema (ankle) present.      Left lower le+ Edema (ankle) present.   Skin:     General: Skin is warm and dry.      Capillary Refill: Capillary refill takes less than 2 seconds.      Coloration: Skin is not cyanotic or pale.      Nails: There is no clubbing.   Neurological:      General: No focal deficit present.      Mental Status: He is alert and oriented to person, place, and time.   Psychiatric:         Attention and Perception: Attention normal.         Mood and Affect: Mood normal.         Speech: Speech normal.         Behavior: Behavior normal. Behavior is cooperative.       Labs  No visits with results within 6 Month(s) from this visit.   Latest known visit with results is:   Hospital Outpatient Visit on 2022   Component Date Value Ref Range Status    BSA 2022 2.45  m2 Final    TDI SEPTAL 2022 0.05  m/s Final    LV LATERAL E/E' RATIO 2022 10.00  m/s Final    LV SEPTAL E/E' RATIO 2022 12.00  m/s Final    LA WIDTH 2022 4.70  cm Final    Right Atrial Pressure (from IVC) 2022 3  mmHg Final    TDI LATERAL 2022 0.06  m/s Final    PV PEAK VELOCITY 2022 0.96  cm/s Final    LVIDd 2022 4.24  3.5 - 6.0 cm Final    IVS 2022 1.05  0.6 - 1.1 cm Final    Posterior Wall 2022 0.94  0.6 - 1.1 cm Final    LVIDs 2022 3.00  2.1 - 4.0 cm Final    FS 2022 29  28 - 44 % Final    LA volume 2022 114.75  cm3 Final    Sinus 2022 3.27  cm Final    STJ 2022 3.22  cm Final    Ascending aorta 2022 3.33  cm Final    LV mass 2022 138.36  g Final    LA size 2022 4.15  cm Final    TAPSE 2022 1.75  cm Final    Left Ventricle Relative Wall Thick* 2022 0.44  cm Final    AV mean gradient 2022 13  mmHg Final    AV valve area 2022 1.50  cm2 Final    AV Velocity Ratio 2022 0.42   Final    AV index (prosthetic) 2022 0.41   Final    MV valve area p 1/2 method 2022 2.78  " cm2 Final    E/A ratio 07/18/2022 0.45   Final    Mean e' 07/18/2022 0.06  m/s Final    E wave deceleration time 07/18/2022 272.85  msec Final    MV "A" wave duration 07/18/2022 10.66  msec Final    Pulm vein S/D ratio 07/18/2022 1.35   Final    LVOT diameter 07/18/2022 2.17  cm Final    LVOT area 07/18/2022 3.7  cm2 Final    LVOT peak elmo 07/18/2022 0.97  m/s Final    LVOT peak VTI 07/18/2022 19.35  cm Final    Ao peak elmo 07/18/2022 2.31  m/s Final    Ao VTI 07/18/2022 47.60  cm Final    Mr max elmo 07/18/2022 0.04  m/s Final    LVOT stroke volume 07/18/2022 71.53  cm3 Final    AV peak gradient 07/18/2022 21  mmHg Final    TV rest pulmonary artery pressure 07/18/2022 27  mmHg Final    E/E' ratio 07/18/2022 10.91  m/s Final    MV Peak E Elmo 07/18/2022 0.60  m/s Final    TR Max Elmo 07/18/2022 2.43  m/s Final    MV stenosis pressure 1/2 time 07/18/2022 79.13  ms Final    MV Peak A Elmo 07/18/2022 1.32  m/s Final    PV Peak S Elmo 07/18/2022 0.46  m/s Final    PV Peak D Elmo 07/18/2022 0.34  m/s Final    LV Systolic Volume 07/18/2022 34.86  mL Final    LV Systolic Volume Index 07/18/2022 14.6  mL/m2 Final    LV Diastolic Volume 07/18/2022 80.26  mL Final    LV Diastolic Volume Index 07/18/2022 33.58  mL/m2 Final    LA Volume Index 07/18/2022 48.0  mL/m2 Final    LV Mass Index 07/18/2022 58  g/m2 Final    RA Major [place] 07/18/2022 5.09  cm Final    Left Atrium Minor Axis 07/18/2022 7.09  cm Final    Left Atrium Major Axis 07/18/2022 6.76  cm Final    Triscuspid Valve Regurgitation Pea* 07/18/2022 24  mmHg Final    LA Volume Index (Mod) 07/18/2022 25.1  mL/m2 Final    LA volume (mod) 07/18/2022 60.00  cm3 Final    RA Width 07/18/2022 2.70  cm Final    EF 07/18/2022 50  % Final       Imaging  No results found.    Assessment  1. Nonrheumatic aortic valve stenosis  Mild with normal left ventricular function  - Echo; Future    2. Primary hypertension  Controlled.  Probably no need to add metoprolol    3. " Hypercholesterolemia  [facility] and [facility] good control      Plan and Discussion    Continue current guideline directed medical therapy.  Repeat echocardiogram prior to next visit.    The 10-year ASCVD risk score (Debra DK, et al., 2019) is: 34.4%    Values used to calculate the score:      Age: 77 years      Sex: Male      Is Non- : No      Diabetic: No      Tobacco smoker: No      Systolic Blood Pressure: 130 mmHg      Is BP treated: Yes      HDL Cholesterol: 58 mg/dL      Total Cholesterol: 178 mg/dL     Follow Up  Follow up in about 6 months (around 7/25/2023).      Alberto Montes MD

## 2023-02-22 ENCOUNTER — DOCUMENTATION ONLY (OUTPATIENT)
Dept: INTERNAL MEDICINE | Facility: CLINIC | Age: 78
End: 2023-02-22
Payer: OTHER GOVERNMENT

## 2023-02-22 DIAGNOSIS — I10 ESSENTIAL HYPERTENSION: Primary | ICD-10-CM

## 2023-02-22 DIAGNOSIS — M17.0 PRIMARY OSTEOARTHRITIS OF BOTH KNEES: ICD-10-CM

## 2023-02-22 DIAGNOSIS — Z78.9 KNOWN MEDICAL PROBLEMS: ICD-10-CM

## 2023-02-22 DIAGNOSIS — E78.00 HIGH CHOLESTEROL: ICD-10-CM

## 2023-02-22 PROCEDURE — 99490 PR CHRONIC CARE MGMT, 1ST 20 MIN: ICD-10-PCS | Mod: S$GLB,,, | Performed by: INTERNAL MEDICINE

## 2023-02-22 PROCEDURE — 99490 CHRNC CARE MGMT STAFF 1ST 20: CPT | Mod: S$GLB,,, | Performed by: INTERNAL MEDICINE

## 2023-02-22 NOTE — PROGRESS NOTES
The patient's electronic chart was reviewed during this month. The patient's medical, functional, and psychosocial needs were assessed. Need for Home health care, PT, OT, , psychiatric care, and hospice was assessed. All preventative care measures were reviewed and updated. All medications were reviewed and reconciled. Potential drug interactions and medication adherence was reviewed. Prescriptions were renewed as appropriate. Education was provided to the patient and/or caregiver as needed, and all questions were answered. Over 20 minutes were spent providing these non-face-to-face services during this calendar month.     Refilled     Disp Refills Start End ADRIANE   febuxostat (ULORIC) 80 mg Tab 90 tablet 0 2/22/2023  No   Sig: TAKE 1 TABLET DAILY   Sent to pharmacy as: febuxostat (ULORIC) 80 mg Tab   Class: Normal   Order: [id number]   Date/Time Signed: 2/22/2023 08:34       E-Prescribing Status: Receipt confirmed by pharmacy (2/22/2023  8:35 AM CST)

## 2023-03-08 ENCOUNTER — OFFICE VISIT (OUTPATIENT)
Dept: INTERNAL MEDICINE | Facility: CLINIC | Age: 78
End: 2023-03-08
Attending: INTERNAL MEDICINE
Payer: MEDICARE

## 2023-03-08 VITALS
WEIGHT: 246 LBS | HEIGHT: 73 IN | SYSTOLIC BLOOD PRESSURE: 122 MMHG | BODY MASS INDEX: 32.6 KG/M2 | DIASTOLIC BLOOD PRESSURE: 76 MMHG | OXYGEN SATURATION: 96 % | HEART RATE: 74 BPM

## 2023-03-08 DIAGNOSIS — Z13.39 SCREENING FOR ALCOHOLISM: ICD-10-CM

## 2023-03-08 DIAGNOSIS — E78.00 HIGH CHOLESTEROL: Primary | ICD-10-CM

## 2023-03-08 DIAGNOSIS — I35.0 NONRHEUMATIC AORTIC VALVE STENOSIS: ICD-10-CM

## 2023-03-08 DIAGNOSIS — M1A.09X0 IDIOPATHIC CHRONIC GOUT OF MULTIPLE SITES WITHOUT TOPHUS: ICD-10-CM

## 2023-03-08 DIAGNOSIS — Z13.31 SCREENING FOR DEPRESSION: ICD-10-CM

## 2023-03-08 DIAGNOSIS — R31.9 HEMATURIA, UNSPECIFIED TYPE: ICD-10-CM

## 2023-03-08 DIAGNOSIS — I10 ESSENTIAL HYPERTENSION: ICD-10-CM

## 2023-03-08 DIAGNOSIS — Z13.89 SCREENING FOR OBESITY: ICD-10-CM

## 2023-03-08 DIAGNOSIS — E66.9 OBESITY (BMI 30.0-34.9): ICD-10-CM

## 2023-03-08 PROCEDURE — 99214 OFFICE O/P EST MOD 30 MIN: CPT | Mod: 25,S$GLB,, | Performed by: INTERNAL MEDICINE

## 2023-03-08 PROCEDURE — G0444 PR DEPRESSION SCREENING: ICD-10-PCS | Mod: 59,S$GLB,, | Performed by: INTERNAL MEDICINE

## 2023-03-08 PROCEDURE — G0447 BEHAVIOR COUNSEL OBESITY 15M: HCPCS | Mod: 59,S$GLB,, | Performed by: INTERNAL MEDICINE

## 2023-03-08 PROCEDURE — 99214 PR OFFICE/OUTPT VISIT, EST, LEVL IV, 30-39 MIN: ICD-10-PCS | Mod: 25,S$GLB,, | Performed by: INTERNAL MEDICINE

## 2023-03-08 PROCEDURE — G0447 PR OBESITY COUNSELING: ICD-10-PCS | Mod: 59,S$GLB,, | Performed by: INTERNAL MEDICINE

## 2023-03-08 PROCEDURE — G0442 PR  ALCOHOL SCREENING: ICD-10-PCS | Mod: 59,S$GLB,, | Performed by: INTERNAL MEDICINE

## 2023-03-08 PROCEDURE — G0442 ANNUAL ALCOHOL SCREEN 15 MIN: HCPCS | Mod: 59,S$GLB,, | Performed by: INTERNAL MEDICINE

## 2023-03-08 PROCEDURE — G0444 DEPRESSION SCREEN ANNUAL: HCPCS | Mod: 59,S$GLB,, | Performed by: INTERNAL MEDICINE

## 2023-03-08 RX ORDER — UBIDECARENONE 75 MG
500 CAPSULE ORAL DAILY
COMMUNITY
End: 2023-09-12 | Stop reason: SDUPTHER

## 2023-03-08 RX ORDER — UBIDECARENONE 75 MG
500 CAPSULE ORAL DAILY
COMMUNITY

## 2023-03-08 NOTE — PROGRESS NOTES
Subjective:       Patient ID: Av Dorantes is a 77 y.o. male.    Chief Complaint: Annual Ex[facility] ([facility]), Hyperlipidemia, and Hypertension    [facility].  His labs were good.  His urinalysis had blood.  He is scheduled to get a CT scan to look for kidney stone.    Hyperlipidemia  This is a chronic problem. The current episode started more than 1 year ago. The problem is controlled.   Hypertension    Review of Systems   Constitutional: Negative.    Respiratory: Negative.     Cardiovascular: Negative.    Musculoskeletal:  Positive for arthralgias.       Objective:      Physical Exam  Constitutional:       Appearance: He is well-developed.   HENT:      Head: Normocephalic and atraumatic.   Eyes:      Pupils: Pupils are equal, round, and reactive to light.   Cardiovascular:      Rate and Rhythm: Normal rate and regular rhythm.      Heart sounds: Murmur heard.   Crescendo decrescendo systolic murmur is present with a grade of 2/6.      Comments: @RUSB  Pulmonary:      Effort: Pulmonary effort is normal.   Musculoskeletal:      Comments: 1+ edema bilateral.   Neurological:      Mental Status: He is alert.       Assessment:       Problem List Items Addressed This Visit          Unprioritized    High cholesterol - Primary    Chronic idiopathic gout of multiple sites    Essential hypertension    Obesity (BMI 30.0-34.9)    Nonrheumatic aortic valve stenosis     Other Visit Diagnoses       Hematuria, unspecified type        Screening for depression        Screening for alcoholism        Screening for obesity                  Plan:       Per orders and D/C instructions.  Continue diet and/or meds for gout, obesity, HTN, and high cholesterol, which are stable.    Follow-up with cardiology for aortic valve stenosis.    Screening: The patient was screened for depression with the PHQ2 questionnaire and possible health consequences were discussed with the patient, who  understands (15 minutes spent).       The patient was screened for the misuse of alcohol, by asking the number of drinks per average week, and if pt has had more than 4 drinks (more than 3 for women and elderly) in 1 day within the past year. The health and legal consequences of misuse were discussed (15 minutes spent).       The patient was screened for obesity (BMI>30), Since the current BMI is > 30, the possible consequences of obesity, as well as the benefits of diet, exercise, and weight loss were discussed. Any behavioral risks were identified, and methods to achieve appropriate treatment goals were discussed (15 minutes spent).

## 2023-03-09 ENCOUNTER — PATIENT MESSAGE (OUTPATIENT)
Dept: INTERNAL MEDICINE | Facility: CLINIC | Age: 78
End: 2023-03-09
Payer: MEDICARE

## 2023-04-25 ENCOUNTER — TELEPHONE (OUTPATIENT)
Dept: CARDIOLOGY | Facility: CLINIC | Age: 78
End: 2023-04-25
Payer: MEDICARE

## 2023-04-25 NOTE — TELEPHONE ENCOUNTER
----- Message from Livier Murrell sent at 4/24/2023  4:31 PM CDT -----  Regarding: FW: Advice  Contact: JENNIFER SAUNDERS [[id number]]    ----- Message -----  From: Magdalena Messina  Sent: 4/24/2023   4:17 PM CDT  To: Jyoti Dominguez Staff  Subject: Advice                                           Name of Who is Calling: Jennifer Saunders            What is the request in detail: Pt states he is requesting a call back in regards to taking some rx due to him going to a trip to [place] , he states there the altitude  it much higher and wants to know if that is a good idea. Please advise           Can the clinic reply by [facility]:  No         What Number to Call Back if not in [facility]: Home Phone      [phone]  Work Phone      Not on file.  Mobile          [phone]

## 2023-04-27 ENCOUNTER — PATIENT MESSAGE (OUTPATIENT)
Dept: CARDIOLOGY | Facility: CLINIC | Age: 78
End: 2023-04-27
Payer: MEDICARE

## 2023-05-05 ENCOUNTER — PATIENT MESSAGE (OUTPATIENT)
Dept: INTERNAL MEDICINE | Facility: CLINIC | Age: 78
End: 2023-05-05
Payer: MEDICARE

## 2023-05-05 DIAGNOSIS — E78.00 HIGH CHOLESTEROL: ICD-10-CM

## 2023-05-05 RX ORDER — SIMVASTATIN 80 MG/1
80 TABLET, FILM COATED ORAL NIGHTLY
Qty: 10 TABLET | Refills: 0 | Status: SHIPPED | OUTPATIENT
Start: 2023-05-05 | End: 2023-11-15 | Stop reason: SDUPTHER

## 2023-05-18 DIAGNOSIS — M1A.09X0 IDIOPATHIC CHRONIC GOUT OF MULTIPLE SITES WITHOUT TOPHUS: ICD-10-CM

## 2023-05-18 RX ORDER — INDOMETHACIN 25 MG/1
CAPSULE ORAL
Qty: 60 CAPSULE | Refills: 0 | Status: SHIPPED | OUTPATIENT
Start: 2023-05-18 | End: 2023-06-16

## 2023-05-22 DIAGNOSIS — M10.9 GOUT, UNSPECIFIED CAUSE, UNSPECIFIED CHRONICITY, UNSPECIFIED SITE: ICD-10-CM

## 2023-05-23 ENCOUNTER — DOCUMENTATION ONLY (OUTPATIENT)
Dept: INTERNAL MEDICINE | Facility: CLINIC | Age: 78
End: 2023-05-23
Payer: MEDICARE

## 2023-05-23 DIAGNOSIS — Z78.9 KNOWN MEDICAL PROBLEMS: ICD-10-CM

## 2023-05-23 DIAGNOSIS — M17.0 PRIMARY OSTEOARTHRITIS OF BOTH KNEES: ICD-10-CM

## 2023-05-23 DIAGNOSIS — I10 ESSENTIAL HYPERTENSION: Primary | ICD-10-CM

## 2023-05-23 DIAGNOSIS — E78.00 HIGH CHOLESTEROL: ICD-10-CM

## 2023-05-23 PROCEDURE — 99490 PR CHRONIC CARE MGMT, 1ST 20 MIN: ICD-10-PCS | Mod: S$GLB,,, | Performed by: INTERNAL MEDICINE

## 2023-05-23 PROCEDURE — 99490 CHRNC CARE MGMT STAFF 1ST 20: CPT | Mod: S$GLB,,, | Performed by: INTERNAL MEDICINE

## 2023-05-23 RX ORDER — FEBUXOSTAT 80 MG/1
TABLET, FILM COATED ORAL
Qty: 90 TABLET | Refills: 3 | Status: SHIPPED | OUTPATIENT
Start: 2023-05-23

## 2023-05-31 NOTE — PROGRESS NOTES
EXAM note      History    Aleisha Talavera is a 72 year old female who presents with multiple issues concerning her ongoing care apparently she needs help getting out of her wheelchair and onto the bed her  who has been taking care for just told that he is leaving and longer be available to assist her in the above the patient is going to hemodialysis 3 times a week sees Rheumatology for management of her lupus and has seen Oncology last year with the agreement that no chemotherapy for her adrenal cancer will be considered at this time patient continues to be weak is sitting all the time she started to get soreness but which could be early signs for pressure sore she is not short of breath she is eating okay but after hemodialysis is very weak for the rest of the day    medical history    Past Medical History:   Diagnosis Date   • A-fib (CMS/HCC)    • Diabetes mellitus (CMS/HCC)     type 2   • Essential (primary) hypertension    • Malignant neoplasm (CMS/HCC)    • Pneumonia        SURGICAL history    Past Surgical History:   Procedure Laterality Date   • Gallbladder surgery     • Hernia repair     • Hysterectomy         social history    Social History     Tobacco Use   • Smoking status: Former Smoker   • Smokeless tobacco: Never Used   Substance Use Topics   • Alcohol use: No     Frequency: Never     Drinks per session: 1 or 2     Binge frequency: Never   • Drug use: No       family history    Family History   Problem Relation Age of Onset   • Congestive Heart Failure Mother    • Hypertension Mother    • Glaucoma Mother    • Myocardial Infarction Father    • Hypertension Father    • Hypertension Paternal Grandmother    • Hypertension Paternal Grandfather    • Cancer Brother         skin        mEDICATIONS    Current Outpatient Medications   Medication Sig   • dilTIAZem (DILACOR XR) 120 MG 24 hr capsule Take 1 capsule by mouth daily.   • azaTHIOprine (IMURAN) 50 MG tablet Take one and one half tablets po daily   •  The patient's electronic chart was reviewed during this month. The patient's medical, functional, and psychosocial needs were assessed. Need for Home health care, PT, OT, , psychiatric care, and hospice was assessed. All preventative care measures were reviewed and updated. All medications were reviewed and reconciled. Potential drug interactions and medication adherence was reviewed. Prescriptions were renewed as appropriate. Education was provided to the patient and/or caregiver as needed, and all questions were answered. Over 20 minutes were spent providing these non-face-to-face services during this calendar month.     Refilled    febuxostat (ULORIC) 80 mg Tab 90 tablet 3 5/23/2023  No   Sig: TAKE 1 TABLET DAILY   Sent to pharmacy as: febuxostat (ULORIC) 80 mg Tab   Class: Normal   Order: [id number]   Date/Time Signed: 5/23/2023 08:42       E-Prescribing Status: Receipt confirmed by pharmacy (5/23/2023  8:42 AM CDT)     indomethacin (INDOCIN) 25 MG capsule 60 capsule 0 5/18/2023  No   Sig: TAKE 1 CAPSULE BY MOUTH TWICE A DAY AS NEEDED   Sent to pharmacy as: indomethacin (INDOCIN) 25 MG capsule   Class: Normal   Order: [id number]   Date/Time Signed: 5/18/2023 16:31       E-Prescribing Status: Receipt confirmed by pharmacy (5/18/2023  4:31 PM CDT)     simvastatin (ZOCOR) 80 MG tablet 10 tablet 0 5/5/2023  No   Sig - Route: Take 1 tablet (80 mg total) by mouth every evening. - Oral   Sent to pharmacy as: simvastatin (ZOCOR) 80 MG tablet   Class: Normal   Order: [id number]   Date/Time Signed: 5/5/2023 14:18       E-Prescribing Status: Receipt confirmed by pharmacy (5/5/2023  2:18 PM CDT)      pantoprazole (PROTONIX) 40 MG tablet Take 1 tablet by mouth daily.   • azithromycin (ZITHROMAX) 250 MG tablet 500 mg first day, then 250 mg daily for 4 days   • HYDROcodone-acetaminophen (NORCO) 5-325 MG per tablet Take 1 tablet by mouth every 6 hours as needed for Pain.   • B Complex-C-Folic Acid (DIALYVITE 800) 0.8 MG tablet Take 1 tablet by mouth daily.   • carvedilol (COREG) 25 MG tablet Take 1 tablet by mouth 2 times daily.   • Elastic Bandages & Supports (MEDICAL COMPRESSION STOCKINGS) Misc 1 application daily. ICD-10 R60.9  One pair of compression stockings 10-15mmHg.   • warfarin (COUMADIN) 4 MG tablet Take 1 tablet by mouth nightly. Or as directed.   • mupirocin (BACTROBAN) 2 % ointment Apply topically 3 times daily.   • furosemide (LASIX) 40 MG tablet Take 1 tablet by mouth daily.   • blood glucose (ITZEL CONTOUR NEXT TEST) test strip USE 1 STRIP TO CHECK GLUCOSE THREE TIMES DAILY   • insulin NPH (HUMULIN N KWIKPEN) 100 UNIT/ML pen-injector Inject 10 Units into the skin daily (before breakfast) AND 6 Units every evening.   • docusate sodium-sennosides (SENNA S) 50-8.6 MG per tablet Take 1 tablet by mouth nightly.   • Epoetin Robinson (EPOGEN IJ) Inject 4,000 Units as directed 3 days a week. Monday, Wednesday, Friday   • cyclobenzaprine (FLEXERIL) 10 MG tablet Take 0.5 tablets by mouth 3 times daily as needed for Muscle spasms.   • petrolatum-zinc oxide 49-15 % ointment apply twice daily to pressure ulcers   • Doxercalciferol (HECTOROL IV) Inject 4.5 mcg into the vein 3 days a week. Administered at dialysis on Monday, Wednesday and Friday   • Iron Sucrose (VENOFER IV) Inject 50 mg into the vein 1 day a week. Administered at dialysis on Monday   • acetaminophen (TYLENOL) 500 MG tablet Take 1,000 mg by mouth 3 times daily. Dose: 2 tabs (=1000mg)   • calcium carbonate-vitamin D (CALTRATE+D) 600-400 MG-UNIT per tablet Take 1 tablet by mouth daily.     No current facility-administered medications for this visit.         aLLERGIES    ALLERGIES:   Allergen Reactions   • Iodine   (Environmental Or Med) Other (See Comments)   • Penicillins Other (See Comments)     Blisters, Eye swelling   • Shellfish Allergy   (Food Or Med) Other (See Comments)       Review of systems      Constitutional:  Patient denies fever, chills, tiredness or malaise.    Eyes:  Denies change in visual acuity, pain, burning or itching.    Immunologic:  Denies hives, seasonal allergies.    HENT: Denies sinus pain or pressure headache difficulty swallowing  Respiratory: Denies shortness of breath although she states there were some lesions on her lung that have responded to therapy she denies cough or exertional dyspnea   Cardiovascular:  Denies chest pain palpitations PND nocturia or cardiovascular disease other than hypertension    GI: Negative for nausea vomiting abdominal pain change of bowel habits  :  Denies urine retention, painful urination, urinary frequency, blood in urine or nocturia.    RECTAL: Denies rectal bleeding constipation states that she needs a colonoscopy for screening purposes  Musculoskeletal:  Denies back pain, neck pain, joint pain or leg swelling.    Integument:  Resolving cellulitis of right lower extremity marked improvement since wound care and being on antibiotics recommend that topical antibiotic and Band-Aid for the small bleeding area in the torso the foot  Neurologic:  Denies headache, focal weakness or sensory changes.    Endocrine:  Sugars are uncontrolled see above history for recommendations and plan using Humalog and insulin    Lymphatic:  Denies swollen glands, weight loss.    All other systems reviewed and negative      Physical Exam    Vital Signs:    Vitals:    11/22/19 1014   BP: 127/81   Pulse: 71   SpO2: 97%     Constitutional:  Awake alert oriented vital signs stable she does not appear to be in any distress   Integument: Ongoing cellulitis right lower extremity with a small area in the dorsum of the foot that  was bleeding earlier due to being on warfarin and having thin skin   HENT: Head is normocephalic TMs intact pharynx not injected tongue the  deviated  Neck: Thyroid is not large no carotid bruits no palpable masses   Eyes:  PERRLA EOMI    Cardiovascular:  Heart regular rate and rhythm S1-S2 normal intensity and duration no murmurs   Respiratory: Lungs are clear to both auscultation percussion no wheezes rales or rhonchi  Breast: Deferred   GI: Abdomen is soft no rigidity guarding tenderness no palpable masses   Neurologic:  Alert & oriented x 3. Normal motor function. Normal sensory function. No focal deficits noted.    RECTAL: Deferred  Chest wall patient has a presence of a hemodialysis vascular access catheter    Assessment & Plan   Aleisha was seen today for follow-up and medicare wellness visit.    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)  -     SERVICE TO HOME CARE  -     SERVICE TO HEMATOLOGY ONCOLOGY    Medicare annual wellness visit, initial    End-stage renal disease (ESRD) (CMS/[facility])  -     SERVICE TO HOME CARE  -     SERVICE TO HEMATOLOGY ONCOLOGY    Malignant neoplasm (CMS/[facility])  -     SERVICE TO HEMATOLOGY ONCOLOGY    Vasculitis (CMS/[facility])    Essential (primary) hypertension    Chronic atrial fibrillation    Lumbar pain with radiation down right leg    Cellulitis and abscess of leg    Skin ulcer of sacrum, limited to breakdown of skin (CMS/[facility])    Type 2 diabetes mellitus with chronic kidney disease on chronic dialysis, with long-term current use of insulin (CMS/[facility])       Recommendation long conversation with patient regarding her present situation and need for assistance in maintaining home care to that effect I ask social service to get involved as well as home [facility] ICU with assistance can be provided her I also asked that she make an appointment with her oncologist today just to discuss her overall condition and perhaps gain  further insight into how she can proceed with her progressive ongoing illness I have asked that she see me

## 2023-06-16 ENCOUNTER — DOCUMENTATION ONLY (OUTPATIENT)
Dept: INTERNAL MEDICINE | Facility: CLINIC | Age: 78
End: 2023-06-16
Payer: MEDICARE

## 2023-06-16 DIAGNOSIS — I10 ESSENTIAL HYPERTENSION: Primary | ICD-10-CM

## 2023-06-16 DIAGNOSIS — M1A.09X0 IDIOPATHIC CHRONIC GOUT OF MULTIPLE SITES WITHOUT TOPHUS: ICD-10-CM

## 2023-06-16 DIAGNOSIS — Z78.9 KNOWN MEDICAL PROBLEMS: ICD-10-CM

## 2023-06-16 DIAGNOSIS — M17.0 PRIMARY OSTEOARTHRITIS OF BOTH KNEES: ICD-10-CM

## 2023-06-16 PROCEDURE — 99490 PR CHRONIC CARE MGMT, 1ST 20 MIN: ICD-10-PCS | Mod: S$GLB,,, | Performed by: INTERNAL MEDICINE

## 2023-06-16 PROCEDURE — 99490 CHRNC CARE MGMT STAFF 1ST 20: CPT | Mod: S$GLB,,, | Performed by: INTERNAL MEDICINE

## 2023-06-16 RX ORDER — INDOMETHACIN 25 MG/1
CAPSULE ORAL
Qty: 30 CAPSULE | Refills: 0 | Status: SHIPPED | OUTPATIENT
Start: 2023-06-16 | End: 2023-08-22

## 2023-06-28 NOTE — PROGRESS NOTES
The patient's electronic chart was reviewed during this month. The patient's medical, functional, and psychosocial needs were assessed. Need for Home health care, PT, OT, , psychiatric care, and hospice was assessed. All preventative care measures were reviewed and updated. All medications were reviewed and reconciled. Potential drug interactions and medication adherence was reviewed. Prescriptions were renewed as appropriate. Education was provided to the patient and/or caregiver as needed, and all questions were answered. Over 20 minutes were spent providing these non-face-to-face services during this calendar month.     Refilled    indomethacin (INDOCIN) 25 MG capsule 30 capsule 0 6/16/2023  No   Sig: TAKE 1 CAPSULE BY MOUTH TWICE A DAY AS NEEDED   Sent to pharmacy as: indomethacin (INDOCIN) 25 MG capsule   Class: Normal   Order: [id number]   Date/Time Signed: 6/16/2023 10:05       E-Prescribing Status: Receipt confirmed by pharmacy (6/16/2023 10:06 AM CDT)

## 2023-07-17 ENCOUNTER — HOSPITAL ENCOUNTER (OUTPATIENT)
Dept: CARDIOLOGY | Facility: OTHER | Age: 78
Discharge: HOME | End: 2023-07-17
Attending: INTERNAL MEDICINE
Payer: MEDICARE

## 2023-07-17 DIAGNOSIS — I35.0 NONRHEUMATIC AORTIC VALVE STENOSIS: ICD-10-CM

## 2023-07-17 LAB
AORTIC ROOT ANNULUS: 2.08 CM
ASCENDING AORTA: 2.84 CM
AV INDEX (PROSTH): 0.37
AV MEAN GRADIENT: 10 MMHG
AV PEAK GRADIENT: 20 MMHG
AV REGURGITATION PRESSURE HALF TIME: 435.05 MS
AV VALVE AREA: 1.19 CM2
AV VELOCITY RATIO: 0.34
CV ECHO LV RWT: 0.42 CM
DOP CALC AO PEAK VEL: 2.24 M/S
DOP CALC AO VTI: 45.1 CM
DOP CALC LVOT AREA: 3.2 CM2
DOP CALC LVOT DIAMETER: 2.02 CM
DOP CALC LVOT PEAK VEL: 0.77 M/S
DOP CALC LVOT STROKE VOLUME: 53.81 CM3
DOP CALCLVOT PEAK VEL VTI: 16.8 CM
E WAVE DECELERATION TIME: 157.17 MSEC
E/A RATIO: 0.84
E/E' RATIO: 13.67 M/S
ECHO LV POSTERIOR WALL: 1.02 CM (ref 0.6–1.1)
EJECTION FRACTION: 50 %
FRACTIONAL SHORTENING: 28 % (ref 28–44)
INTERVENTRICULAR SEPTUM: 1.31 CM (ref 0.6–1.1)
IVC DIAMETER: 1.94 CM
IVRT: 62.8 MSEC
LA MAJOR: 7.56 CM
LA MINOR: 7.24 CM
LA WIDTH: 4.3 CM
LEFT ATRIUM SIZE: 4.95 CM
LEFT ATRIUM VOLUME MOD: 91.46 CM3
LEFT ATRIUM VOLUME: 133.82 CM3
LEFT INTERNAL DIMENSION IN SYSTOLE: 3.55 CM (ref 2.1–4)
LEFT VENTRICLE DIASTOLIC VOLUME: 113.19 ML
LEFT VENTRICLE SYSTOLIC VOLUME: 52.81 ML
LEFT VENTRICULAR INTERNAL DIMENSION IN DIASTOLE: 4.91 CM (ref 3.5–6)
LEFT VENTRICULAR MASS: 217.86 G
LV LATERAL E/E' RATIO: 13.67 M/S
LV SEPTAL E/E' RATIO: 13.67 M/S
LVOT MG: 1.35 MMHG
LVOT MV: 0.55 CM/S
MV PEAK A VEL: 0.98 M/S
MV PEAK E VEL: 0.82 M/S
MV STENOSIS PRESSURE HALF TIME: 45.58 MS
MV VALVE AREA P 1/2 METHOD: 4.83 CM2
PISA AR MAX VEL: 3.79 M/S
PISA TR MAX VEL: 2.99 M/S
PV MV: 0.77 M/S
PV PEAK VELOCITY: 1.24 CM/S
RA MAJOR: 6.18 CM
RA PRESSURE: 3 MMHG
RA WIDTH: 3.8 CM
RIGHT VENTRICULAR END-DIASTOLIC DIMENSION: 2.42 CM
SINUS: 2.6 CM
STJ: 2.74 CM
TDI LATERAL: 0.06 M/S
TDI SEPTAL: 0.06 M/S
TDI: 0.06 M/S
TR MAX PG: 36 MMHG
TRICUSPID ANNULAR PLANE SYSTOLIC EXCURSION: 1.7 CM
TV REST PULMONARY ARTERY PRESSURE: 39 MMHG

## 2023-07-17 PROCEDURE — 93306 TTE W/DOPPLER COMPLETE: CPT

## 2023-07-17 PROCEDURE — 93306 ECHO (CUPID ONLY): ICD-10-PCS | Mod: 26,,, | Performed by: INTERNAL MEDICINE

## 2023-07-17 PROCEDURE — 93306 TTE W/DOPPLER COMPLETE: CPT | Mod: 26,,, | Performed by: INTERNAL MEDICINE

## 2023-07-25 ENCOUNTER — OFFICE VISIT (OUTPATIENT)
Dept: CARDIOLOGY | Facility: CLINIC | Age: 78
End: 2023-07-25
Payer: MEDICARE

## 2023-07-25 ENCOUNTER — LAB VISIT (OUTPATIENT)
Dept: LAB | Facility: OTHER | Age: 78
End: 2023-07-25
Attending: INTERNAL MEDICINE
Payer: MEDICARE

## 2023-07-25 VITALS
BODY MASS INDEX: 33.22 KG/M2 | WEIGHT: 250.69 LBS | HEART RATE: 77 BPM | HEIGHT: 73 IN | SYSTOLIC BLOOD PRESSURE: 106 MMHG | DIASTOLIC BLOOD PRESSURE: 58 MMHG | OXYGEN SATURATION: 95 %

## 2023-07-25 DIAGNOSIS — I50.33 ACUTE ON CHRONIC DIASTOLIC HEART FAILURE: Primary | ICD-10-CM

## 2023-07-25 DIAGNOSIS — I35.0 NONRHEUMATIC AORTIC VALVE STENOSIS: ICD-10-CM

## 2023-07-25 DIAGNOSIS — I10 PRIMARY HYPERTENSION: ICD-10-CM

## 2023-07-25 DIAGNOSIS — E78.00 HYPERCHOLESTEROLEMIA: ICD-10-CM

## 2023-07-25 LAB
ANION GAP SERPL CALC-SCNC: 10 MMOL/L (ref 8–16)
BNP SERPL-MCNC: 91 PG/ML (ref 0–99)
BUN SERPL-MCNC: 19 MG/DL (ref 8–23)
CALCIUM SERPL-MCNC: 10 MG/DL (ref 8.7–10.5)
CHLORIDE SERPL-SCNC: 107 MMOL/L (ref 95–110)
CO2 SERPL-SCNC: 22 MMOL/L (ref 23–29)
CREAT SERPL-MCNC: 1.2 MG/DL (ref 0.5–1.4)
EST. GFR  (NO RACE VARIABLE): >60 ML/MIN/1.73 M^2
GLUCOSE SERPL-MCNC: 107 MG/DL (ref 70–110)
MAGNESIUM SERPL-MCNC: 1.7 MG/DL (ref 1.6–2.6)
POTASSIUM SERPL-SCNC: 4.5 MMOL/L (ref 3.5–5.1)
SODIUM SERPL-SCNC: 139 MMOL/L (ref 136–145)

## 2023-07-25 PROCEDURE — 36415 COLL VENOUS BLD VENIPUNCTURE: CPT | Performed by: INTERNAL MEDICINE

## 2023-07-25 PROCEDURE — 99215 OFFICE O/P EST HI 40 MIN: CPT | Mod: S$PBB,,, | Performed by: INTERNAL MEDICINE

## 2023-07-25 PROCEDURE — 99999 PR PBB SHADOW E&M-EST. PATIENT-LVL IV: CPT | Mod: PBBFAC,,, | Performed by: INTERNAL MEDICINE

## 2023-07-25 PROCEDURE — 83880 ASSAY OF NATRIURETIC PEPTIDE: CPT | Performed by: INTERNAL MEDICINE

## 2023-07-25 PROCEDURE — 80048 BASIC METABOLIC PNL TOTAL CA: CPT | Performed by: INTERNAL MEDICINE

## 2023-07-25 PROCEDURE — 99999 PR PBB SHADOW E&M-EST. PATIENT-LVL IV: ICD-10-PCS | Mod: PBBFAC,,, | Performed by: INTERNAL MEDICINE

## 2023-07-25 PROCEDURE — 99214 OFFICE O/P EST MOD 30 MIN: CPT | Mod: PBBFAC | Performed by: INTERNAL MEDICINE

## 2023-07-25 PROCEDURE — 99215 PR OFFICE/OUTPT VISIT, EST, LEVL V, 40-54 MIN: ICD-10-PCS | Mod: S$PBB,,, | Performed by: INTERNAL MEDICINE

## 2023-07-25 PROCEDURE — 83735 ASSAY OF MAGNESIUM: CPT | Performed by: INTERNAL MEDICINE

## 2023-07-25 RX ORDER — FUROSEMIDE 40 MG/1
40 TABLET ORAL DAILY
Qty: 90 TABLET | Refills: 3 | Status: SHIPPED | OUTPATIENT
Start: 2023-07-25 | End: 2023-09-12

## 2023-07-25 NOTE — PROGRESS NOTES
[facility]    Chief Complaint  Chief Complaint   Patient presents with    Valvular Heart Disease    Shortness of Breath       HPI:    Presents for routine follow-up.  Noting more exertional dyspnea.  Has been blaming it on the heat.  Also more lower extremity edema.  No paroxysmal nocturnal dyspnea or orthopnea. Continues to exercise regularly.    Medications  Current Outpatient Medications   Medication Sig Dispense Refill    cholecalciferol, vitamin D3, (VITAMIN D3) 25 mcg (1,000 unit) capsule Take 1,000 Units by mouth once daily.      colchicine (MITIGARE) 0.6 mg Cap Take 1 capsule (0.6 mg total) by mouth once daily. 30 capsule 5    cyanocobalamin 500 MCG tablet Take 500 mcg by mouth once daily.      febuxostat (ULORIC) 80 mg Tab TAKE 1 TABLET DAILY 90 tablet 3    indomethacin (INDOCIN) 25 MG capsule TAKE 1 CAPSULE BY MOUTH TWICE A DAY AS NEEDED 30 capsule 0    irbesartan (AVAPRO) 300 MG tablet TAKE 1 TABLET BY MOUTH EVERY DAY 90 tablet 1    pantoprazole (PROTONIX) 40 MG tablet TAKE 1 TABLET BY MOUTH EVERY DAY AS NEEDED 30 tablet 1    potassium citrate (UROCIT-K) 10 mEq (1,080 mg) TbSR Take 1 tablet (10 mEq total) by mouth 3 (three) times daily with meals. 20 tablet 0    simvastatin (ZOCOR) 80 MG tablet Take 1 tablet (80 mg total) by mouth every evening. 10 tablet 0    cyanocobalamin 500 MCG tablet Take 500 mcg by mouth once daily.      furosemide (LASIX) 40 MG tablet Take 1 tablet (40 mg total) by mouth once daily. 90 tablet 3     No current facility-administered medications for this visit.        History  Past Medical History:   Diagnosis Date    Abnormal stress echo 10/12/2015    9/15/2015: Stress Echo: 6:00 min. No CP. ECG negative but frequent VPCs. Echo negative.     Aortic valve stenosis     Family history of premature CAD 01/22/2013    Gout, unspecified 01/22/2013    High cholesterol 01/22/2013    Hypertension, benign 01/22/2013    1995: Diagnosed.     Normal cardiac stress test 01/22/2013     Ureteral stone 2013     Past Surgical History:   Procedure Laterality Date    ADENOIDECTOMY      TONSILLECTOMY       Social History     Socioeconomic History    Marital status:     Number of children: 2   Occupational History    Occupation: [employer]   Tobacco Use    Smoking status: Former     Packs/day: 2.00     Years: 30.00     Pack years: 60.00     Types: Cigarettes     Start date: 1962     Quit date: 1992     Years since quittin.5    Smokeless tobacco: Never   Substance and Sexual Activity    Alcohol use: Yes     Alcohol/week: 15.0 standard drinks     Types: 15 Shots of liquor per week    Drug use: No    Sexual activity: Not Currently   Social History Narrative    Walks 30 min 3x/wks. Weights 2-3/wk.    Goes to [place] every July.    2019 - retired.     Family History   Problem Relation Age of Onset    Cancer Mother     Heart disease Father 51        MI    Diabetes Paternal Grandfather         Allergies  Review of patient's allergies indicates:  No Known Allergies    Review of Systems   Review of Systems   Constitutional: Negative for malaise/fatigue, weight gain and weight loss.   Eyes:  Negative for visual disturbance.   Cardiovascular:  Positive for dyspnea on exertion and leg swelling. Negative for chest pain, claudication, cyanosis, irregular heartbeat, near-syncope, orthopnea, palpitations, paroxysmal nocturnal dyspnea and syncope.   Respiratory:  Negative for cough, hemoptysis, shortness of breath, sleep disturbances due to breathing and wheezing.    Hematologic/Lymphatic: Negative for bleeding problem. Does not bruise/bleed easily.   Skin:  Negative for poor wound healing.   Musculoskeletal:  Negative for muscle cramps and myalgias.   Gastrointestinal:  Negative for abdominal pain, anorexia, diarrhea, heartburn, hematemesis, hematochezia, melena, nausea and vomiting.   Genitourinary:  Negative for hematuria and nocturia.   Neurological:  Negative for excessive daytime  sleepiness, dizziness, focal weakness, light-headedness and weakness.     Physical Exam  Vitals:    23 1048   BP: (!) 106/58   Pulse: 77     Wt Readings from Last 1 Encounters:   23 113.7 kg (250 lb 11.2 oz)     Physical Exam  Vitals and nursing note reviewed.   Constitutional:       General: He is not in acute distress.     Appearance: He is not toxic-appearing or diaphoretic.   HENT:      Head: Normocephalic and atraumatic.      Mouth/Throat:      Lips: Pink.      Mouth: Mucous membranes are moist.   Eyes:      General: No scleral icterus.     Conjunctiva/sclera: Conjunctivae normal.   Neck:      Thyroid: No thyromegaly.      Vascular: JVD present. No carotid bruit.      Trachea: Trachea normal.   Cardiovascular:      Rate and Rhythm: Normal rate and regular rhythm. No extrasystoles are present.     Chest Wall: PMI is not displaced.      Pulses:           Carotid pulses are 2+ on the right side and 2+ on the left side.       Radial pulses are 2+ on the right side and 2+ on the left side.        Posterior tibial pulses are 2+ on the right side and 2+ on the left side.      Heart sounds: S1 normal and S2 normal. Murmur heard.   Harsh midsystolic murmur is present with a grade of 2/6 at the upper right sternal border radiating to the neck.     No friction rub. No S3 or S4 sounds.   Pulmonary:      Effort: Pulmonary effort is normal. No tachypnea, bradypnea, accessory muscle usage or respiratory distress.      Breath sounds: Normal breath sounds and air entry. No decreased breath sounds, wheezing, rhonchi or rales.   Abdominal:      General: Bowel sounds are normal. There is no distension or abdominal bruit.      Palpations: Abdomen is soft. There is no hepatomegaly, splenomegaly or pulsatile mass.      Tenderness: There is no abdominal tenderness.   Musculoskeletal:         General: No tenderness or deformity.      Right lower le+ Edema present.      Left lower le+ Edema present.   Skin:      General: Skin is warm and dry.      Capillary Refill: Capillary refill takes less than 2 seconds.      Coloration: Skin is not cyanotic or pale.      Nails: There is no clubbing.   Neurological:      General: No focal deficit present.      Mental Status: He is alert and oriented to person, place, and time.   Psychiatric:         Attention and Perception: Attention normal.         Mood and Affect: Mood normal.         Speech: Speech normal.         Behavior: Behavior normal. Behavior is cooperative.       Labs  Hospital Outpatient Visit on 07/17/2023   Component Date Value Ref Range Status    TDI SEPTAL 07/17/2023 0.06  m/s Final    LV LATERAL E/E' RATIO 07/17/2023 13.67  m/s Final    LV SEPTAL E/E' RATIO 07/17/2023 13.67  m/s Final    LA WIDTH 07/17/2023 4.30  cm Final    IVC diameter 07/17/2023 1.94  cm Final    Left Ventricular Outflow Tract Sophia* 07/17/2023 0.55  cm/s Final    Left Ventricular Outflow Tract Sophia* 07/17/2023 1.35  mmHg Final    Pulmonary Valve Mean Velocity 07/17/2023 0.77  m/s Final    TDI LATERAL 07/17/2023 0.06  m/s Final    PV PEAK VELOCITY 07/17/2023 1.24  cm/s Final    LVIDd 07/17/2023 4.91  3.5 - 6.0 cm Final    IVS 07/17/2023 1.31 (A)  0.6 - 1.1 cm Final    Posterior Wall 07/17/2023 1.02  0.6 - 1.1 cm Final    Ao root annulus 07/17/2023 2.08  cm Final    LVIDs 07/17/2023 3.55  2.1 - 4.0 cm Final    FS 07/17/2023 28  28 - 44 % Final    LA volume 07/17/2023 133.82  cm3 Final    Sinus 07/17/2023 2.60  cm Final    STJ 07/17/2023 2.74  cm Final    Ascending aorta 07/17/2023 2.84  cm Final    LV mass 07/17/2023 217.86  g Final    LA size 07/17/2023 4.95  cm Final    RVDD 07/17/2023 2.42  cm Final    TAPSE 07/17/2023 1.70  cm Final    Left Ventricle Relative Wall Thick* 07/17/2023 0.42  cm Final    AV regurgitation pressure 1/2 time 07/17/2023 435.565195798740063  ms Final    AV mean gradient 07/17/2023 10  mmHg Final    AV valve area 07/17/2023 1.19  cm2 Final    AV Velocity Ratio 07/17/2023 0.34    Final    AV index (prosthetic) 07/17/2023 0.37   Final    MV valve area p 1/2 method 07/17/2023 4.83  cm2 Final    E/A ratio 07/17/2023 0.84   Final    Mean e' 07/17/2023 0.06  m/s Final    E wave deceleration time 07/17/2023 157.17  msec Final    IVRT 07/17/2023 62.80  msec Final    LVOT diameter 07/17/2023 2.02  cm Final    LVOT area 07/17/2023 3.2  cm2 Final    LVOT peak elmo 07/17/2023 0.77  m/s Final    LVOT peak VTI 07/17/2023 16.80  cm Final    Ao peak elmo 07/17/2023 2.24  m/s Final    Ao VTI 07/17/2023 45.1  cm Final    LVOT stroke volume 07/17/2023 53.81  cm3 Final    AV peak gradient 07/17/2023 20  mmHg Final    E/E' ratio 07/17/2023 13.67  m/s Final    MV Peak E Elmo 07/17/2023 0.82  m/s Final    AR Max Elmo 07/17/2023 3.79  m/s Final    TR Max Elmo 07/17/2023 2.99  m/s Final    MV stenosis pressure 1/2 time 07/17/2023 45.58  ms Final    MV Peak A Elmo 07/17/2023 0.98  m/s Final    LV Systolic Volume 07/17/2023 52.81  mL Final    LV Diastolic Volume 07/17/2023 113.19  mL Final    RA Major Axis 07/17/2023 6.18  cm Final    Left Atrium Minor Axis 07/17/2023 7.24  cm Final    Left Atrium Major Axis 07/17/2023 7.56  cm Final    Triscuspid Valve Regurgitation Pea* 07/17/2023 36  mmHg Final    LA volume (mod) 07/17/2023 91.46  cm3 Final    RA Width 07/17/2023 3.80  cm Final    Right Atrial Pressure (from IVC) 07/17/2023 3  mmHg Final    EF 07/17/2023 50  % Final    TV rest pulmonary artery pressure 07/17/2023 39  mmHg Final       Imaging  Echo    Result Date: 7/17/2023  · The left ventricle is normal in size with concentric hypertrophy and low normal systolic function. · Grade I left ventricular diastolic dysfunction. · Normal right ventricular size with normal right ventricular systolic function. · Moderate left atrial enlargement. · There is moderate aortic valve stenosis. · Mild mitral regurgitation. · Mild tricuspid regurgitation. · There is mild pulmonary hypertension.        Assessment  1. Nonrheumatic aortic  valve stenosis  Velocities across the valve are unchanged  - furosemide (LASIX) 40 MG tablet; Take 1 tablet (40 mg total) by mouth once daily.  Dispense: 90 tablet; Refill: 3  - Basic Metabolic Panel; Future  - BNP; Future  - Magnesium; Future    2. Primary hypertension  Controlled    3. Hypercholesterolemia  [facility]  Reports good control    4. Acute on chronic diastolic heart failure  Looks volume overloaded.  Will start Lasix      Plan and Discussion    Discussed the importance of sodium restriction.  Discussed weight loss.  Start Lasix.  Close follow-up to assess response.    The 10-year ASCVD risk score (Debra DK, et al., 2019) is: 25.3%    Values used to calculate the score:      Age: 77 years      Sex: Male      Is Non- : No      Diabetic: No      Tobacco smoker: No      Systolic Blood Pressure: 106 mmHg      Is BP treated: Yes      HDL Cholesterol: 58 mg/dL      Total Cholesterol: 178 mg/dL     Follow Up  Follow up in about 3 weeks (around 8/15/2023).      Alberto Montes MD

## 2023-08-15 ENCOUNTER — OFFICE VISIT (OUTPATIENT)
Dept: CARDIOLOGY | Facility: CLINIC | Age: 78
End: 2023-08-15
Payer: MEDICARE

## 2023-08-15 ENCOUNTER — LAB VISIT (OUTPATIENT)
Dept: LAB | Facility: OTHER | Age: 78
End: 2023-08-15
Attending: INTERNAL MEDICINE
Payer: MEDICARE

## 2023-08-15 VITALS
HEART RATE: 79 BPM | WEIGHT: 243.38 LBS | OXYGEN SATURATION: 97 % | SYSTOLIC BLOOD PRESSURE: 102 MMHG | DIASTOLIC BLOOD PRESSURE: 50 MMHG | BODY MASS INDEX: 32.11 KG/M2

## 2023-08-15 DIAGNOSIS — I50.33 ACUTE ON CHRONIC DIASTOLIC HEART FAILURE: ICD-10-CM

## 2023-08-15 DIAGNOSIS — I50.33 ACUTE ON CHRONIC DIASTOLIC HEART FAILURE: Primary | ICD-10-CM

## 2023-08-15 DIAGNOSIS — E78.00 HYPERCHOLESTEROLEMIA: ICD-10-CM

## 2023-08-15 DIAGNOSIS — I10 PRIMARY HYPERTENSION: ICD-10-CM

## 2023-08-15 DIAGNOSIS — I35.0 NONRHEUMATIC AORTIC VALVE STENOSIS: ICD-10-CM

## 2023-08-15 LAB
ANION GAP SERPL CALC-SCNC: 12 MMOL/L (ref 8–16)
BUN SERPL-MCNC: 33 MG/DL (ref 8–23)
CALCIUM SERPL-MCNC: 9.4 MG/DL (ref 8.7–10.5)
CHLORIDE SERPL-SCNC: 100 MMOL/L (ref 95–110)
CO2 SERPL-SCNC: 23 MMOL/L (ref 23–29)
CREAT SERPL-MCNC: 1.5 MG/DL (ref 0.5–1.4)
EST. GFR  (NO RACE VARIABLE): 47 ML/MIN/1.73 M^2
GLUCOSE SERPL-MCNC: 106 MG/DL (ref 70–110)
MAGNESIUM SERPL-MCNC: 1.6 MG/DL (ref 1.6–2.6)
POTASSIUM SERPL-SCNC: 4.6 MMOL/L (ref 3.5–5.1)
SODIUM SERPL-SCNC: 135 MMOL/L (ref 136–145)

## 2023-08-15 PROCEDURE — 83735 ASSAY OF MAGNESIUM: CPT | Performed by: INTERNAL MEDICINE

## 2023-08-15 PROCEDURE — 99999 PR PBB SHADOW E&M-EST. PATIENT-LVL III: CPT | Mod: PBBFAC,,, | Performed by: INTERNAL MEDICINE

## 2023-08-15 PROCEDURE — 99214 PR OFFICE/OUTPT VISIT, EST, LEVL IV, 30-39 MIN: ICD-10-PCS | Mod: S$PBB,,, | Performed by: INTERNAL MEDICINE

## 2023-08-15 PROCEDURE — 99999 PR PBB SHADOW E&M-EST. PATIENT-LVL III: ICD-10-PCS | Mod: PBBFAC,,, | Performed by: INTERNAL MEDICINE

## 2023-08-15 PROCEDURE — 99214 OFFICE O/P EST MOD 30 MIN: CPT | Mod: S$PBB,,, | Performed by: INTERNAL MEDICINE

## 2023-08-15 PROCEDURE — 36415 COLL VENOUS BLD VENIPUNCTURE: CPT | Performed by: INTERNAL MEDICINE

## 2023-08-15 PROCEDURE — 80048 BASIC METABOLIC PNL TOTAL CA: CPT | Performed by: INTERNAL MEDICINE

## 2023-08-15 PROCEDURE — 99213 OFFICE O/P EST LOW 20 MIN: CPT | Mod: PBBFAC | Performed by: INTERNAL MEDICINE

## 2023-08-15 NOTE — PROGRESS NOTES
[facility]    Chief Complaint  Chief Complaint   Patient presents with    Congestive Heart Failure       HPI:    Much improved.  Has diuresed 7 lb.  Less dyspneic with exertion.  Cut back on his dietary sodium.    Medications  Current Outpatient Medications   Medication Sig Dispense Refill    cholecalciferol, vitamin D3, (VITAMIN D3) 25 mcg (1,000 unit) capsule Take 1,000 Units by mouth once daily.      colchicine (MITIGARE) 0.6 mg Cap Take 1 capsule (0.6 mg total) by mouth once daily. 30 capsule 5    cyanocobalamin 500 MCG tablet Take 500 mcg by mouth once daily.      cyanocobalamin 500 MCG tablet Take 500 mcg by mouth once daily.      febuxostat (ULORIC) 80 mg Tab TAKE 1 TABLET DAILY 90 tablet 3    furosemide (LASIX) 40 MG tablet Take 1 tablet (40 mg total) by mouth once daily. 90 tablet 3    indomethacin (INDOCIN) 25 MG capsule TAKE 1 CAPSULE BY MOUTH TWICE A DAY AS NEEDED 30 capsule 0    irbesartan (AVAPRO) 300 MG tablet TAKE 1 TABLET BY MOUTH EVERY DAY 90 tablet 1    pantoprazole (PROTONIX) 40 MG tablet TAKE 1 TABLET BY MOUTH EVERY DAY AS NEEDED 30 tablet 1    potassium citrate (UROCIT-K) 10 mEq (1,080 mg) TbSR Take 1 tablet (10 mEq total) by mouth 3 (three) times daily with meals. 20 tablet 0    simvastatin (ZOCOR) 80 MG tablet Take 1 tablet (80 mg total) by mouth every evening. 10 tablet 0     No current facility-administered medications for this visit.        History  Past Medical History:   Diagnosis Date    Abnormal stress echo 10/12/2015    9/15/2015: Stress Echo: 6:00 min. No CP. ECG negative but frequent VPCs. Echo negative.     Aortic valve stenosis     Family history of premature CAD 01/22/2013    Gout, unspecified 01/22/2013    High cholesterol 01/22/2013    Hypertension, benign 01/22/2013    1995: Diagnosed.     Normal cardiac stress test 01/22/2013    Ureteral stone 01/22/2013     Past Surgical History:   Procedure Laterality Date    ADENOIDECTOMY      TONSILLECTOMY       Social  History     Socioeconomic History    Marital status:     Number of children: 2   Occupational History    Occupation: [employer]   Tobacco Use    Smoking status: Former     Current packs/day: 0.00     Average packs/day: 2.0 packs/day for 30.0 years (60.0 ttl pk-yrs)     Types: Cigarettes     Start date: 1962     Quit date: 1992     Years since quittin.6    Smokeless tobacco: Never   Substance and Sexual Activity    Alcohol use: Yes     Alcohol/week: 15.0 standard drinks of alcohol     Types: 15 Shots of liquor per week    Drug use: No    Sexual activity: Not Currently   Social History Narrative    Walks 30 min 3x/wks. Weights 2-3/wk.    Goes to [place] every July.    2019 - retired.     Family History   Problem Relation Age of Onset    Cancer Mother     Heart disease Father 51        MI    Diabetes Paternal Grandfather         Allergies  Review of patient's allergies indicates:  No Known Allergies    Review of Systems   Review of Systems   Constitutional: Negative for malaise/fatigue, weight gain and weight loss.   Eyes:  Negative for visual disturbance.   Cardiovascular:  Negative for chest pain, claudication, cyanosis, dyspnea on exertion, irregular heartbeat, leg swelling, near-syncope, orthopnea, palpitations, paroxysmal nocturnal dyspnea and syncope.   Respiratory:  Negative for cough, hemoptysis, shortness of breath, sleep disturbances due to breathing and wheezing.    Hematologic/Lymphatic: Negative for bleeding problem. Does not bruise/bleed easily.   Skin:  Negative for poor wound healing.   Musculoskeletal:  Negative for muscle cramps and myalgias.   Gastrointestinal:  Negative for abdominal pain, anorexia, diarrhea, heartburn, hematemesis, hematochezia, melena, nausea and vomiting.   Genitourinary:  Negative for hematuria and nocturia.   Neurological:  Negative for excessive daytime sleepiness, dizziness, focal weakness, light-headedness and weakness.       Physical Exam  Vitals:     08/15/23 1024   BP: (!) 102/50   Pulse: 79     Wt Readings from Last 1 Encounters:   08/15/23 110.4 kg (243 lb 6.4 oz)     Physical Exam  Vitals and nursing note reviewed.   Constitutional:       General: He is not in acute distress.     Appearance: He is not toxic-appearing or diaphoretic.   HENT:      Head: Normocephalic and atraumatic.      Mouth/Throat:      Lips: Pink.      Mouth: Mucous membranes are moist.   Eyes:      General: No scleral icterus.     Conjunctiva/sclera: Conjunctivae normal.   Neck:      Thyroid: No thyromegaly.      Vascular: JVD present. No carotid bruit.      Trachea: Trachea normal.   Cardiovascular:      Rate and Rhythm: Normal rate and regular rhythm. No extrasystoles are present.     Chest Wall: PMI is not displaced.      Pulses:           Carotid pulses are 2+ on the right side and 2+ on the left side.       Radial pulses are 2+ on the right side and 2+ on the left side.        Posterior tibial pulses are 2+ on the right side and 2+ on the left side.      Heart sounds: S1 normal and S2 normal. Murmur heard.      Harsh midsystolic murmur is present with a grade of 2/6 at the upper right sternal border radiating to the neck.      No friction rub. No S3 or S4 sounds.   Pulmonary:      Effort: Pulmonary effort is normal. No tachypnea, bradypnea, accessory muscle usage or respiratory distress.      Breath sounds: Normal breath sounds and air entry. No decreased breath sounds, wheezing, rhonchi or rales.   Abdominal:      General: Bowel sounds are normal. There is no distension or abdominal bruit.      Palpations: Abdomen is soft. There is no hepatomegaly, splenomegaly or pulsatile mass.      Tenderness: There is no abdominal tenderness.   Musculoskeletal:         General: No tenderness or deformity.      Right lower leg: No edema.      Left lower leg: No edema.   Skin:     General: Skin is warm and dry.      Capillary Refill: Capillary refill takes less than 2 seconds.      [place]:  Skin is not cyanotic or pale.      Nails: There is no clubbing.   Neurological:      General: No focal deficit present.      Mental Status: He is alert and oriented to person, place, and time.   Psychiatric:         Attention and Perception: Attention normal.         Mood and Affect: Mood normal.         Speech: Speech normal.         Behavior: Behavior normal. Behavior is cooperative.         Labs  Lab Visit on 07/25/2023   Component Date Value Ref Range Status    Sodium 07/25/2023 139  136 - 145 mmol/L Final    Potassium 07/25/2023 4.5  3.5 - 5.1 mmol/L Final    Chloride 07/25/2023 107  95 - 110 mmol/L Final    CO2 07/25/2023 22 (L)  23 - 29 mmol/L Final    Glucose 07/25/2023 107  70 - 110 mg/dL Final    BUN 07/25/2023 19  8 - 23 mg/dL Final    Creatinine 07/25/2023 1.2  0.5 - 1.4 mg/dL Final    Calcium 07/25/2023 10.0  8.7 - 10.5 mg/dL Final    Anion Gap 07/25/2023 10  8 - 16 mmol/L Final    eGFR 07/25/2023 >60  >60 mL/min/1.73 m^2 Final    BNP 07/25/2023 91  0 - 99 pg/mL Final    Values of less than 100 pg/ml are consistent with non-CHF populations.    Magnesium 07/25/2023 1.7  1.6 - 2.6 mg/dL Final   Hospital Outpatient Visit on 07/17/2023   Component Date Value Ref Range Status    TDI SEPTAL 07/17/2023 0.06  m/s Final    LV LATERAL E/E' RATIO 07/17/2023 13.67  m/s Final    LV SEPTAL E/E' RATIO 07/17/2023 13.67  m/s Final    LA WIDTH 07/17/2023 4.30  cm Final    IVC diameter 07/17/2023 1.94  cm Final    Left Ventricular Outflow Tract Sophia* 07/17/2023 0.55  cm/s Final    Left Ventricular Outflow Tract Sophia* 07/17/2023 1.35  mmHg Final    Pulmonary Valve Mean Velocity 07/17/2023 0.77  m/s Final    TDI LATERAL 07/17/2023 0.06  m/s Final    PV PEAK VELOCITY 07/17/2023 1.24  cm/s Final    LVIDd 07/17/2023 4.91  3.5 - 6.0 cm Final    IVS 07/17/2023 1.31 (A)  0.6 - 1.1 cm Final    Posterior Wall 07/17/2023 1.02  0.6 - 1.1 cm Final    Ao root annulus 07/17/2023 2.08  cm Final    LVIDs 07/17/2023 3.55  2.1 - 4.0 cm Final     FS 07/17/2023 28  28 - 44 % Final    LA volume 07/17/2023 133.82  cm3 Final    Sinus 07/17/2023 2.60  cm Final    STJ 07/17/2023 2.74  cm Final    Ascending aorta 07/17/2023 2.84  cm Final    LV mass 07/17/2023 217.86  g Final    LA size 07/17/2023 4.95  cm Final    RVDD 07/17/2023 2.42  cm Final    TAPSE 07/17/2023 1.70  cm Final    Left Ventricle Relative Wall Thick* 07/17/2023 0.42  cm Final    AV regurgitation pressure 1/2 time 07/17/2023 435.064671463240543  ms Final    AV mean gradient 07/17/2023 10  mmHg Final    AV valve area 07/17/2023 1.19  cm2 Final    AV Velocity Ratio 07/17/2023 0.34   Final    AV index (prosthetic) 07/17/2023 0.37   Final    MV valve area p 1/2 method 07/17/2023 4.83  cm2 Final    E/A ratio 07/17/2023 0.84   Final    Mean e' 07/17/2023 0.06  m/s Final    E wave deceleration time 07/17/2023 157.17  msec Final    IVRT 07/17/2023 62.80  msec Final    LVOT diameter 07/17/2023 2.02  cm Final    LVOT area 07/17/2023 3.2  cm2 Final    LVOT peak elmo 07/17/2023 0.77  m/s Final    LVOT peak VTI 07/17/2023 16.80  cm Final    Ao peak elmo 07/17/2023 2.24  m/s Final    Ao VTI 07/17/2023 45.1  cm Final    LVOT stroke volume 07/17/2023 53.81  cm3 Final    AV peak gradient 07/17/2023 20  mmHg Final    E/E' ratio 07/17/2023 13.67  m/s Final    MV Peak E Elmo 07/17/2023 0.82  m/s Final    AR Max Elmo 07/17/2023 3.79  m/s Final    TR Max Elmo 07/17/2023 2.99  m/s Final    MV stenosis pressure 1/2 time 07/17/2023 45.58  ms Final    MV Peak A Elmo 07/17/2023 0.98  m/s Final    LV Systolic Volume 07/17/2023 52.81  mL Final    LV Diastolic Volume 07/17/2023 113.19  mL Final    RA Major Axis 07/17/2023 6.18  cm Final    Left Atrium Minor Axis 07/17/2023 7.24  cm Final    Left Atrium Major Axis 07/17/2023 7.56  cm Final    Triscuspid Valve Regurgitation Pea* 07/17/2023 36  mmHg Final    LA volume (mod) 07/17/2023 91.46  cm3 Final    RA Width 07/17/2023 3.80  cm Final    Right Atrial Pressure (from IVC)  07/17/2023 3  mmHg Final    EF 07/17/2023 50  % Final    TV resting pulmonary artery pressu* 07/17/2023 39  mmHg Final       Imaging  Echo    Result Date: 7/17/2023  · The left ventricle is normal in size with concentric hypertrophy and low normal systolic function. · Grade I left ventricular diastolic dysfunction. · Normal right ventricular size with normal right ventricular systolic function. · Moderate left atrial enlargement. · There is moderate aortic valve stenosis. · Mild mitral regurgitation. · Mild tricuspid regurgitation. · There is mild pulmonary hypertension.        Assessment  1. Acute on chronic diastolic heart failure  Responded well to diuresis.  Appears euvolemic.  - Basic Metabolic Panel; Future  - Magnesium; Future    2. Nonrheumatic aortic valve stenosis  Moderate    3. Primary hypertension  Controlled      Plan and Discussion    Continue with present management.  Discussed self titration of Lasix.  Discussed monitoring daily weights.  He is going to buy a scale today.    The 10-year ASCVD risk score (Debra DK, et al., 2019) is: 23.8%    Values used to calculate the score:      Age: 77 years      Sex: Male      Is Non- : No      Diabetic: No      Tobacco smoker: No      Systolic Blood Pressure: 102 mmHg      Is BP treated: Yes      HDL Cholesterol: 58 mg/dL      Total Cholesterol: 178 mg/dL     Follow Up  Follow up in about 3 months (around 11/15/2023).      Alberto Montes MD

## 2023-08-22 DIAGNOSIS — M1A.09X0 IDIOPATHIC CHRONIC GOUT OF MULTIPLE SITES WITHOUT TOPHUS: ICD-10-CM

## 2023-08-22 RX ORDER — INDOMETHACIN 25 MG/1
CAPSULE ORAL
Qty: 14 CAPSULE | Refills: 0 | Status: SHIPPED | OUTPATIENT
Start: 2023-08-22 | End: 2024-01-09

## 2023-09-12 ENCOUNTER — OFFICE VISIT (OUTPATIENT)
Dept: INTERNAL MEDICINE | Facility: CLINIC | Age: 78
End: 2023-09-12
Attending: INTERNAL MEDICINE
Payer: MEDICARE

## 2023-09-12 ENCOUNTER — LAB VISIT (OUTPATIENT)
Dept: LAB | Facility: OTHER | Age: 78
End: 2023-09-12
Attending: INTERNAL MEDICINE
Payer: MEDICARE

## 2023-09-12 VITALS
SYSTOLIC BLOOD PRESSURE: 98 MMHG | OXYGEN SATURATION: 95 % | DIASTOLIC BLOOD PRESSURE: 70 MMHG | BODY MASS INDEX: 31.68 KG/M2 | WEIGHT: 239 LBS | HEART RATE: 68 BPM | HEIGHT: 73 IN

## 2023-09-12 DIAGNOSIS — D50.8 OTHER IRON DEFICIENCY ANEMIA: ICD-10-CM

## 2023-09-12 DIAGNOSIS — E55.9 VITAMIN D DEFICIENCY: ICD-10-CM

## 2023-09-12 DIAGNOSIS — I35.0 NONRHEUMATIC AORTIC VALVE STENOSIS: ICD-10-CM

## 2023-09-12 DIAGNOSIS — E78.9 DISORDER OF LIPID METABOLISM: ICD-10-CM

## 2023-09-12 DIAGNOSIS — E03.9 HYPOTHYROIDISM, UNSPECIFIED TYPE: ICD-10-CM

## 2023-09-12 DIAGNOSIS — R79.89 OTHER SPECIFIED ABNORMAL FINDINGS OF BLOOD CHEMISTRY: ICD-10-CM

## 2023-09-12 DIAGNOSIS — M1A.09X0 IDIOPATHIC CHRONIC GOUT OF MULTIPLE SITES WITHOUT TOPHUS: ICD-10-CM

## 2023-09-12 DIAGNOSIS — E66.9 OBESITY (BMI 30.0-34.9): ICD-10-CM

## 2023-09-12 DIAGNOSIS — R42 DIZZY SPELLS: ICD-10-CM

## 2023-09-12 DIAGNOSIS — Z12.5 SCREENING FOR PROSTATE CANCER: ICD-10-CM

## 2023-09-12 DIAGNOSIS — E78.00 HIGH CHOLESTEROL: Primary | ICD-10-CM

## 2023-09-12 DIAGNOSIS — M1A.09X0 IDIOPATHIC CHRONIC GOUT OF MULTIPLE SITES WITHOUT TOPHUS: Primary | ICD-10-CM

## 2023-09-12 DIAGNOSIS — I10 ESSENTIAL HYPERTENSION: ICD-10-CM

## 2023-09-12 DIAGNOSIS — D51.0 PERNICIOUS ANEMIA: ICD-10-CM

## 2023-09-12 LAB
25(OH)D3+25(OH)D2 SERPL-MCNC: 49 NG/ML (ref 30–96)
ALBUMIN SERPL BCP-MCNC: 3.8 G/DL (ref 3.5–5.2)
ALP SERPL-CCNC: 53 U/L (ref 55–135)
ALT SERPL W/O P-5'-P-CCNC: 20 U/L (ref 10–44)
ANION GAP SERPL CALC-SCNC: 8 MMOL/L (ref 8–16)
AST SERPL-CCNC: 21 U/L (ref 10–40)
BASOPHILS # BLD AUTO: 0.04 K/UL (ref 0–0.2)
BASOPHILS NFR BLD: 0.7 % (ref 0–1.9)
BILIRUB SERPL-MCNC: 0.7 MG/DL (ref 0.1–1)
BNP SERPL-MCNC: 119 PG/ML (ref 0–99)
BUN SERPL-MCNC: 20 MG/DL (ref 8–23)
CALCIUM SERPL-MCNC: 9.7 MG/DL (ref 8.7–10.5)
CHLORIDE SERPL-SCNC: 106 MMOL/L (ref 95–110)
CHOLEST SERPL-MCNC: 143 MG/DL (ref 120–199)
CHOLEST/HDLC SERPL: 2.6 {RATIO} (ref 2–5)
CO2 SERPL-SCNC: 24 MMOL/L (ref 23–29)
CREAT SERPL-MCNC: 1.2 MG/DL (ref 0.5–1.4)
DIFFERENTIAL METHOD: ABNORMAL
EOSINOPHIL # BLD AUTO: 0.1 K/UL (ref 0–0.5)
EOSINOPHIL NFR BLD: 2 % (ref 0–8)
ERYTHROCYTE [DISTWIDTH] IN BLOOD BY AUTOMATED COUNT: 11.8 % (ref 11.5–14.5)
EST. GFR  (NO RACE VARIABLE): >60 ML/MIN/1.73 M^2
ESTIMATED AVG GLUCOSE: 120 MG/DL (ref 68–131)
GLUCOSE SERPL-MCNC: 97 MG/DL (ref 70–110)
HBA1C MFR BLD: 5.8 % (ref 4–5.6)
HCT VFR BLD AUTO: 40.2 % (ref 40–54)
HDLC SERPL-MCNC: 55 MG/DL (ref 40–75)
HDLC SERPL: 38.5 % (ref 20–50)
HGB BLD-MCNC: 13.2 G/DL (ref 14–18)
IMM GRANULOCYTES # BLD AUTO: 0.03 K/UL (ref 0–0.04)
IMM GRANULOCYTES NFR BLD AUTO: 0.6 % (ref 0–0.5)
LDLC SERPL CALC-MCNC: 78.8 MG/DL (ref 63–159)
LYMPHOCYTES # BLD AUTO: 0.9 K/UL (ref 1–4.8)
LYMPHOCYTES NFR BLD: 17.1 % (ref 18–48)
MCH RBC QN AUTO: 32.9 PG (ref 27–31)
MCHC RBC AUTO-ENTMCNC: 32.8 G/DL (ref 32–36)
MCV RBC AUTO: 100 FL (ref 82–98)
MONOCYTES # BLD AUTO: 0.7 K/UL (ref 0.3–1)
MONOCYTES NFR BLD: 13.2 % (ref 4–15)
NEUTROPHILS # BLD AUTO: 3.6 K/UL (ref 1.8–7.7)
NEUTROPHILS NFR BLD: 66.4 % (ref 38–73)
NONHDLC SERPL-MCNC: 88 MG/DL
NRBC BLD-RTO: 0 /100 WBC
PLATELET # BLD AUTO: 134 K/UL (ref 150–450)
PMV BLD AUTO: 10.1 FL (ref 9.2–12.9)
POTASSIUM SERPL-SCNC: 5.2 MMOL/L (ref 3.5–5.1)
PROT SERPL-MCNC: 6.9 G/DL (ref 6–8.4)
RBC # BLD AUTO: 4.01 M/UL (ref 4.6–6.2)
SODIUM SERPL-SCNC: 138 MMOL/L (ref 136–145)
TRIGL SERPL-MCNC: 46 MG/DL (ref 30–150)
TSH SERPL DL<=0.005 MIU/L-ACNC: 2.11 UIU/ML (ref 0.4–4)
URATE SERPL-MCNC: 4 MG/DL (ref 3.4–7)
VIT B12 SERPL-MCNC: 762 PG/ML (ref 210–950)
WBC # BLD AUTO: 5.39 K/UL (ref 3.9–12.7)

## 2023-09-12 PROCEDURE — G0008 ADMIN INFLUENZA VIRUS VAC: HCPCS | Mod: S$GLB,,, | Performed by: INTERNAL MEDICINE

## 2023-09-12 PROCEDURE — 36415 COLL VENOUS BLD VENIPUNCTURE: CPT | Performed by: INTERNAL MEDICINE

## 2023-09-12 PROCEDURE — 90694 FLU VACCINE - QUADRIVALENT - ADJUVANTED: ICD-10-PCS | Mod: S$GLB,,, | Performed by: INTERNAL MEDICINE

## 2023-09-12 PROCEDURE — 90694 VACC AIIV4 NO PRSRV 0.5ML IM: CPT | Mod: S$GLB,,, | Performed by: INTERNAL MEDICINE

## 2023-09-12 PROCEDURE — 85025 COMPLETE CBC W/AUTO DIFF WBC: CPT | Performed by: INTERNAL MEDICINE

## 2023-09-12 PROCEDURE — 84550 ASSAY OF BLOOD/URIC ACID: CPT | Performed by: INTERNAL MEDICINE

## 2023-09-12 PROCEDURE — G0008 FLU VACCINE - QUADRIVALENT - ADJUVANTED: ICD-10-PCS | Mod: S$GLB,,, | Performed by: INTERNAL MEDICINE

## 2023-09-12 PROCEDURE — 83036 HEMOGLOBIN GLYCOSYLATED A1C: CPT | Performed by: INTERNAL MEDICINE

## 2023-09-12 PROCEDURE — 99214 PR OFFICE/OUTPT VISIT, EST, LEVL IV, 30-39 MIN: ICD-10-PCS | Mod: S$GLB,,, | Performed by: INTERNAL MEDICINE

## 2023-09-12 PROCEDURE — 80061 LIPID PANEL: CPT | Performed by: INTERNAL MEDICINE

## 2023-09-12 PROCEDURE — 82306 VITAMIN D 25 HYDROXY: CPT | Performed by: INTERNAL MEDICINE

## 2023-09-12 PROCEDURE — 82607 VITAMIN B-12: CPT | Performed by: INTERNAL MEDICINE

## 2023-09-12 PROCEDURE — 83880 ASSAY OF NATRIURETIC PEPTIDE: CPT | Performed by: INTERNAL MEDICINE

## 2023-09-12 PROCEDURE — 84443 ASSAY THYROID STIM HORMONE: CPT | Performed by: INTERNAL MEDICINE

## 2023-09-12 PROCEDURE — 99214 OFFICE O/P EST MOD 30 MIN: CPT | Mod: S$GLB,,, | Performed by: INTERNAL MEDICINE

## 2023-09-12 PROCEDURE — 80053 COMPREHEN METABOLIC PANEL: CPT | Performed by: INTERNAL MEDICINE

## 2023-09-12 RX ORDER — TAMSULOSIN HYDROCHLORIDE 0.4 MG/1
0.4 CAPSULE ORAL
COMMUNITY
Start: 2023-06-06 | End: 2023-09-12

## 2023-09-12 NOTE — PROGRESS NOTES
Subjective     Patient ID: Av Dorantes is a 77 y.o. male.    Chief Complaint: Annual Exam, Hyperlipidemia, and Dizziness (Believes it may be caused by the Lasix it is more significant when bending over, so he did discontinue the Lasix on Friday and it has subsided a great deal however is still a little dizzy )    He was having dizzy spells.  Four days ago he discontinued furosemide and tamsulosin and he feels much better now.    Hyperlipidemia  This is a chronic problem. The problem is controlled.   Hypertension  This is a chronic problem. The current episode started more than 1 year ago. The problem is controlled.     Review of Systems   Constitutional: Negative.    Respiratory: Negative.     Cardiovascular: Negative.    Neurological:  Positive for dizziness.          Objective     Physical Exam  Constitutional:       Appearance: He is well-developed.   HENT:      Head: Normocephalic and atraumatic.   Eyes:      Pupils: Pupils are equal, round, and reactive to light.   Cardiovascular:      Rate and Rhythm: Normal rate and regular rhythm.      Heart sounds: Murmur heard.      Crescendo decrescendo systolic murmur is present with a grade of 2/6.      Comments: @RUSB  Pulmonary:      Effort: Pulmonary effort is normal.   Neurological:      Mental Status: He is alert.            Assessment and Plan     1. High cholesterol  Overview:  LDL: 220      2. Essential hypertension    3. Nonrheumatic aortic valve stenosis  Overview:  6/22 - Mild  1/23 - Moderate  increase LA      4. Idiopathic chronic gout of multiple sites without tophus  Overview:  Right foot      5. Obesity (BMI 30.0-34.9)    Other orders  -     Influenza - Quadrivalent (Adjuvanted)        Per orders and D/C instructions.  Continue diet and/or meds for aortic valve stenosis, gout, obesity, HTN, and high cholesterol, which are stable.  Check routine labs.  Flu shot today.    Between 30 and 39 min of total time for evaluation and management services were  spent on the patient today.  The medical problems and treatment options were discussed, and all questions were answered.             Follow up in about 6 months (around 3/12/2024).

## 2023-09-13 ENCOUNTER — PATIENT MESSAGE (OUTPATIENT)
Dept: INTERNAL MEDICINE | Facility: CLINIC | Age: 78
End: 2023-09-13
Payer: MEDICARE

## 2023-11-15 ENCOUNTER — OFFICE VISIT (OUTPATIENT)
Dept: CARDIOLOGY | Facility: CLINIC | Age: 78
End: 2023-11-15
Payer: MEDICARE

## 2023-11-15 VITALS
SYSTOLIC BLOOD PRESSURE: 114 MMHG | BODY MASS INDEX: 31.68 KG/M2 | HEART RATE: 82 BPM | WEIGHT: 240.13 LBS | DIASTOLIC BLOOD PRESSURE: 76 MMHG

## 2023-11-15 DIAGNOSIS — I50.32 CHRONIC DIASTOLIC HEART FAILURE: Primary | ICD-10-CM

## 2023-11-15 DIAGNOSIS — I10 PRIMARY HYPERTENSION: ICD-10-CM

## 2023-11-15 DIAGNOSIS — I35.0 NONRHEUMATIC AORTIC VALVE STENOSIS: ICD-10-CM

## 2023-11-15 DIAGNOSIS — E78.00 HYPERCHOLESTEROLEMIA: ICD-10-CM

## 2023-11-15 PROCEDURE — 99999 PR PBB SHADOW E&M-EST. PATIENT-LVL III: ICD-10-PCS | Mod: PBBFAC,,, | Performed by: INTERNAL MEDICINE

## 2023-11-15 PROCEDURE — 99999 PR PBB SHADOW E&M-EST. PATIENT-LVL III: CPT | Mod: PBBFAC,,, | Performed by: INTERNAL MEDICINE

## 2023-11-15 PROCEDURE — 99214 PR OFFICE/OUTPT VISIT, EST, LEVL IV, 30-39 MIN: ICD-10-PCS | Mod: S$PBB,,, | Performed by: INTERNAL MEDICINE

## 2023-11-15 PROCEDURE — 99213 OFFICE O/P EST LOW 20 MIN: CPT | Mod: PBBFAC | Performed by: INTERNAL MEDICINE

## 2023-11-15 PROCEDURE — 99214 OFFICE O/P EST MOD 30 MIN: CPT | Mod: S$PBB,,, | Performed by: INTERNAL MEDICINE

## 2023-11-15 RX ORDER — SIMVASTATIN 80 MG/1
80 TABLET, FILM COATED ORAL NIGHTLY
Qty: 30 TABLET | Refills: 0 | Status: SHIPPED | OUTPATIENT
Start: 2023-11-15 | End: 2023-12-07

## 2023-11-15 NOTE — PROGRESS NOTES
[facility]    Chief Complaint  Chief Complaint   Patient presents with    Valvular Heart Disease       HPI:    He has been doing well.  No complaints.  Watching his dietary sodium.  Weighing himself daily.  Weight fluctuates no more than 2 lb.  Not requiring any Lasix.  Exercises 6-7 days per week for 30-60 minutes.  No exertional dyspnea or chest discomfort.    Medications  Current Outpatient Medications   Medication Sig Dispense Refill    cholecalciferol, vitamin D3, (VITAMIN D3) 25 mcg (1,000 unit) capsule Take 1,000 Units by mouth once daily.      colchicine (MITIGARE) 0.6 mg Cap Take 1 capsule (0.6 mg total) by mouth once daily. 30 capsule 5    cyanocobalamin 500 MCG tablet Take 500 mcg by mouth once daily.      febuxostat (ULORIC) 80 mg Tab TAKE 1 TABLET DAILY 90 tablet 3    indomethacin (INDOCIN) 25 MG capsule TAKE 1 CAPSULE BY MOUTH TWICE A DAY AS NEEDED 14 capsule 0    irbesartan (AVAPRO) 300 MG tablet TAKE 1 TABLET BY MOUTH EVERY DAY 90 tablet 1    pantoprazole (PROTONIX) 40 MG tablet TAKE 1 TABLET BY MOUTH EVERY DAY AS NEEDED 30 tablet 1    potassium citrate (UROCIT-K) 10 mEq (1,080 mg) TbSR Take 1 tablet (10 mEq total) by mouth 3 (three) times daily with meals. 20 tablet 0    simvastatin (ZOCOR) 80 MG tablet Take 1 tablet (80 mg total) by mouth every evening. 30 tablet 0     No current facility-administered medications for this visit.        History  Past Medical History:   Diagnosis Date    Abnormal stress echo 10/12/2015    9/15/2015: Stress Echo: 6:00 min. No CP. ECG negative but frequent VPCs. Echo negative.     Aortic valve stenosis     Family history of premature CAD 01/22/2013    Gout, unspecified 01/22/2013    High cholesterol 01/22/2013    Hypertension, benign 01/22/2013    1995: Diagnosed.     Normal cardiac stress test 01/22/2013    Ureteral stone 01/22/2013     Past Surgical History:   Procedure Laterality Date    ADENOIDECTOMY      TONSILLECTOMY       Social History      Socioeconomic History    Marital status:     Number of children: 2   Occupational History    Occupation: [employer]   Tobacco Use    Smoking status: Former     Current packs/day: 0.00     Average packs/day: 2.0 packs/day for 30.0 years (60.0 ttl pk-yrs)     Types: Cigarettes     Start date: 1962     Quit date: 1992     Years since quittin.8    Smokeless tobacco: Never   Substance and Sexual Activity    Alcohol use: Yes     Alcohol/week: 15.0 standard drinks of alcohol     Types: 15 Shots of liquor per week    Drug use: No    Sexual activity: Not Currently   Social History Narrative    Walks 30 min 3x/wks. Weights 2-3/wk.    Goes to [place] every July.    2019 - retired.     Family History   Problem Relation Age of Onset    Cancer Mother     Heart disease Father 51        MI    Diabetes Paternal Grandfather         Allergies  Review of patient's allergies indicates:  No Known Allergies    Review of Systems   Review of Systems   Constitutional: Negative for malaise/fatigue, weight gain and weight loss.   Eyes:  Negative for visual disturbance.   Cardiovascular:  Negative for chest pain, claudication, cyanosis, dyspnea on exertion, irregular heartbeat, leg swelling, near-syncope, orthopnea, palpitations, paroxysmal nocturnal dyspnea and syncope.   Respiratory:  Negative for cough, hemoptysis, shortness of breath, sleep disturbances due to breathing and wheezing.    Hematologic/Lymphatic: Negative for bleeding problem. Does not bruise/bleed easily.   Skin:  Negative for poor wound healing.   Musculoskeletal:  Negative for muscle cramps and myalgias.   Gastrointestinal:  Negative for abdominal pain, anorexia, diarrhea, heartburn, hematemesis, hematochezia, melena, nausea and vomiting.   Genitourinary:  Negative for hematuria and nocturia.   Neurological:  Negative for excessive daytime sleepiness, dizziness, focal weakness, light-headedness and weakness.       Physical Exam  Vitals:    11/15/23  1544   BP: 114/76   Pulse: 82     Wt Readings from Last 1 Encounters:   11/15/23 108.9 kg (240 lb 1.6 oz)     Physical Exam  Vitals and nursing note reviewed.   Constitutional:       General: He is not in acute distress.     Appearance: He is not toxic-appearing or diaphoretic.   HENT:      Head: Normocephalic and atraumatic.      Mouth/Throat:      Lips: Pink.      Mouth: Mucous membranes are moist.   Eyes:      General: No scleral icterus.     Conjunctiva/sclera: Conjunctivae normal.   Neck:      Thyroid: No thyromegaly.      Vascular: No carotid bruit, hepatojugular reflux or JVD.      Trachea: Trachea normal.   Cardiovascular:      Rate and Rhythm: Normal rate and regular rhythm. No extrasystoles are present.     Chest Wall: PMI is not displaced.      Pulses:           Carotid pulses are 2+ on the right side and 2+ on the left side.       Radial pulses are 2+ on the right side and 2+ on the left side.        Posterior tibial pulses are 2+ on the right side and 2+ on the left side.      Heart sounds: S1 normal and S2 normal. Murmur heard.      Harsh midsystolic murmur is present with a grade of 2/6 at the upper right sternal border radiating to the neck.      No friction rub. No S3 or S4 sounds.   Pulmonary:      Effort: Pulmonary effort is normal. No tachypnea, bradypnea, accessory muscle usage or respiratory distress.      Breath sounds: Normal breath sounds and air entry. No decreased breath sounds, wheezing, rhonchi or rales.   Abdominal:      General: Bowel sounds are normal. There is no distension or abdominal bruit.      Palpations: Abdomen is soft. There is no hepatomegaly, splenomegaly or pulsatile mass.      Tenderness: There is no abdominal tenderness.   Musculoskeletal:         General: No tenderness or deformity.      Right lower leg: No edema.      Left lower leg: No edema.   Skin:     General: Skin is warm and dry.      Capillary Refill: Capillary refill takes less than 2 seconds.      Coloration:  Skin is not cyanotic or pale.      Nails: There is no clubbing.   Neurological:      General: No focal deficit present.      Mental Status: He is alert and oriented to person, place, and time.   Psychiatric:         Attention and Perception: Attention normal.         Mood and Affect: Mood normal.         Speech: Speech normal.         Behavior: Behavior normal. Behavior is cooperative.         Labs  Lab Visit on 09/12/2023   Component Date Value Ref Range Status    Sodium 09/12/2023 138  136 - 145 mmol/L Final    Potassium 09/12/2023 5.2 (H)  3.5 - 5.1 mmol/L Final    Chloride 09/12/2023 106  95 - 110 mmol/L Final    CO2 09/12/2023 24  23 - 29 mmol/L Final    Glucose 09/12/2023 97  70 - 110 mg/dL Final    BUN 09/12/2023 20  8 - 23 mg/dL Final    Creatinine 09/12/2023 1.2  0.5 - 1.4 mg/dL Final    Calcium 09/12/2023 9.7  8.7 - 10.5 mg/dL Final    Total Protein 09/12/2023 6.9  6.0 - 8.4 g/dL Final    Albumin 09/12/2023 3.8  3.5 - 5.2 g/dL Final    Total Bilirubin 09/12/2023 0.7  0.1 - 1.0 mg/dL Final    Comment: For infants and newborns, interpretation of results should be based  on gestational age, weight and in agreement with clinical  observations.    Premature Infant recommended reference ranges:  Up to 24 hours.............<8.0 mg/dL  Up to 48 hours............<12.0 mg/dL  3-5 days..................<15.0 mg/dL  6-29 days.................<15.0 mg/dL      Alkaline Phosphatase 09/12/2023 53 (L)  55 - 135 U/L Final    AST 09/12/2023 21  10 - 40 U/L Final    ALT 09/12/2023 20  10 - 44 U/L Final    eGFR 09/12/2023 >60  >60 mL/min/1.73 m^2 Final    Anion Gap 09/12/2023 8  8 - 16 mmol/L Final    Cholesterol 09/12/2023 143  120 - 199 mg/dL Final    Comment: The National Cholesterol Education Program (NCEP) has set the  following guidelines (reference ranges) for Cholesterol:  Optimal.....................<200 mg/dL  Borderline High.............200-239 mg/dL  High........................> or = 240 mg/dL      Triglycerides  09/12/2023 46  30 - 150 mg/dL Final    Comment: The National Cholesterol Education Program (NCEP) has set the  following guidelines (reference values) for triglycerides:  Normal......................<150 mg/dL  Borderline High.............150-199 mg/dL  High........................200-499 mg/dL      HDL 09/12/2023 55  40 - 75 mg/dL Final    Comment: The National Cholesterol Education Program (NCEP) has set the  following guidelines (reference values) for HDL Cholesterol:  Low...............<40 mg/dL  Optimal...........>60 mg/dL      LDL Cholesterol 09/12/2023 78.8  63.0 - 159.0 mg/dL Final    Comment: The National Cholesterol Education Program (NCEP) has set the  following guidelines (reference values) for LDL Cholesterol:  Optimal.......................<130 mg/dL  Borderline High...............130-159 mg/dL  High..........................160-189 mg/dL  Very High.....................>190 mg/dL      HDL/Cholesterol Ratio 09/12/2023 38.5  20.0 - 50.0 % Final    Total Cholesterol/HDL Ratio 09/12/2023 2.6  2.0 - 5.0 Final    Non-HDL Cholesterol 09/12/2023 88  mg/dL Final    Comment: Risk category and Non-HDL cholesterol goals:  Coronary heart disease (CHD)or equivalent (10-year risk of CHD >20%):  Non-HDL cholesterol goal     <130 mg/dL  Two or more CHD risk factors and 10-year risk of CHD <= 20%:  Non-HDL cholesterol goal     <160 mg/dL  0 to 1 CHD risk factor:  Non-HDL cholesterol goal     <190 mg/dL      WBC 09/12/2023 5.39  3.90 - 12.70 K/uL Final    RBC 09/12/2023 4.01 (L)  4.60 - 6.20 M/uL Final    Hemoglobin 09/12/2023 13.2 (L)  14.0 - 18.0 g/dL Final    Hematocrit 09/12/2023 40.2  40.0 - 54.0 % Final    MCV 09/12/2023 100 (H)  82 - 98 fL Final    MCH 09/12/2023 32.9 (H)  27.0 - 31.0 pg Final    MCHC 09/12/2023 32.8  32.0 - 36.0 g/dL Final    RDW 09/12/2023 11.8  11.5 - 14.5 % Final    Platelets 09/12/2023 134 (L)  150 - 450 K/uL Final    MPV 09/12/2023 10.1  9.2 - 12.9 fL Final    Immature Granulocytes  09/12/2023 0.6 (H)  0.0 - 0.5 % Final    Gran # (ANC) 09/12/2023 3.6  1.8 - 7.7 K/uL Final    Immature Grans (Abs) 09/12/2023 0.03  0.00 - 0.04 K/uL Final    Comment: Mild elevation in immature granulocytes is non specific and   can be seen in a variety of conditions including stress response,   acute inflammation, trauma and pregnancy. Correlation with other   laboratory and clinical findings is essential.      Lymph # 09/12/2023 0.9 (L)  1.0 - 4.8 K/uL Final    Mono # 09/12/2023 0.7  0.3 - 1.0 K/uL Final    Eos # 09/12/2023 0.1  0.0 - 0.5 K/uL Final    Baso # 09/12/2023 0.04  0.00 - 0.20 K/uL Final    nRBC 09/12/2023 0  0 /100 WBC Final    Gran % 09/12/2023 66.4  38.0 - 73.0 % Final    Lymph % 09/12/2023 17.1 (L)  18.0 - 48.0 % Final    Mono % 09/12/2023 13.2  4.0 - 15.0 % Final    Eosinophil % 09/12/2023 2.0  0.0 - 8.0 % Final    Basophil % 09/12/2023 0.7  0.0 - 1.9 % Final    Differential Method 09/12/2023 Automated   Final    Hemoglobin A1C 09/12/2023 5.8 (H)  4.0 - 5.6 % Final    Comment: ADA Screening Guidelines:  5.7-6.4%  Consistent with prediabetes  >or=6.5%  Consistent with diabetes    High levels of fetal hemoglobin interfere with the HbA1C  assay. Heterozygous hemoglobin variants (HbS, HgC, etc)do  not significantly interfere with this assay.   However, presence of multiple variants may affect accuracy.      Estimated Avg Glucose 09/12/2023 120  68 - 131 mg/dL Final    TSH 09/12/2023 2.110  0.400 - 4.000 uIU/mL Final    Vitamin B-12 09/12/2023 762  210 - 950 pg/mL Final    Vit D, 25-Hydroxy 09/12/2023 49  30 - 96 ng/mL Final    Comment: Vitamin D deficiency.........<10 ng/mL                              Vitamin D insufficiency......10-29 ng/mL       Vitamin D sufficiency........> or equal to 30 ng/mL  Vitamin D toxicity............>100 ng/mL      BNP 09/12/2023 119 (H)  0 - 99 pg/mL Final    Values of less than 100 pg/ml are consistent with non-CHF populations.    Uric Acid 09/12/2023 4.0  3.4 - 7.0  mg/dL Final   Lab Visit on 08/15/2023   Component Date Value Ref Range Status    Sodium 08/15/2023 135 (L)  136 - 145 mmol/L Final    Potassium 08/15/2023 4.6  3.5 - 5.1 mmol/L Final    Chloride 08/15/2023 100  95 - 110 mmol/L Final    CO2 08/15/2023 23  23 - 29 mmol/L Final    Glucose 08/15/2023 106  70 - 110 mg/dL Final    BUN 08/15/2023 33 (H)  8 - 23 mg/dL Final    Creatinine 08/15/2023 1.5 (H)  0.5 - 1.4 mg/dL Final    Calcium 08/15/2023 9.4  8.7 - 10.5 mg/dL Final    Anion Gap 08/15/2023 12  8 - 16 mmol/L Final    eGFR 08/15/2023 47 (A)  >60 mL/min/1.73 m^2 Final    Magnesium 08/15/2023 1.6  1.6 - 2.6 mg/dL Final   Lab Visit on 07/25/2023   Component Date Value Ref Range Status    Sodium 07/25/2023 139  136 - 145 mmol/L Final    Potassium 07/25/2023 4.5  3.5 - 5.1 mmol/L Final    Chloride 07/25/2023 107  95 - 110 mmol/L Final    CO2 07/25/2023 22 (L)  23 - 29 mmol/L Final    Glucose 07/25/2023 107  70 - 110 mg/dL Final    BUN 07/25/2023 19  8 - 23 mg/dL Final    Creatinine 07/25/2023 1.2  0.5 - 1.4 mg/dL Final    Calcium 07/25/2023 10.0  8.7 - 10.5 mg/dL Final    Anion Gap 07/25/2023 10  8 - 16 mmol/L Final    eGFR 07/25/2023 >60  >60 mL/min/1.73 m^2 Final    BNP 07/25/2023 91  0 - 99 pg/mL Final    Values of less than 100 pg/ml are consistent with non-CHF populations.    Magnesium 07/25/2023 1.7  1.6 - 2.6 mg/dL Final   Hospital Outpatient Visit on 07/17/2023   Component Date Value Ref Range Status    TDI SEPTAL 07/17/2023 0.06  m/s Final    LV LATERAL E/E' RATIO 07/17/2023 13.67  m/s Final    LV SEPTAL E/E' RATIO 07/17/2023 13.67  m/s Final    LA WIDTH 07/17/2023 4.30  cm Final    IVC diameter 07/17/2023 1.94  cm Final    Left Ventricular Outflow Tract Sophia* 07/17/2023 0.55  cm/s Final    Left Ventricular Outflow Tract Sophia* 07/17/2023 1.35  mmHg Final    Pulmonary Valve Mean Velocity 07/17/2023 0.77  m/s Final    TDI LATERAL 07/17/2023 0.06  m/s Final    PV PEAK VELOCITY 07/17/2023 1.24  cm/s Final    LVIDd  07/17/2023 4.91  3.5 - 6.0 cm Final    IVS 07/17/2023 1.31 (A)  0.6 - 1.1 cm Final    Posterior Wall 07/17/2023 1.02  0.6 - 1.1 cm Final    Ao root annulus 07/17/2023 2.08  cm Final    LVIDs 07/17/2023 3.55  2.1 - 4.0 cm Final    FS 07/17/2023 28  28 - 44 % Final    LA volume 07/17/2023 133.82  cm3 Final    Sinus 07/17/2023 2.60  cm Final    STJ 07/17/2023 2.74  cm Final    Ascending aorta 07/17/2023 2.84  cm Final    LV mass 07/17/2023 217.86  g Final    LA size 07/17/2023 4.95  cm Final    RVDD 07/17/2023 2.42  cm Final    TAPSE 07/17/2023 1.70  cm Final    Left Ventricle Relative Wall Thick* 07/17/2023 0.42  cm Final    AV regurgitation pressure 1/2 time 07/17/2023 [id number]  ms Final    AV mean gradient 07/17/2023 10  mmHg Final    AV valve area 07/17/2023 1.19  cm2 Final    AV Velocity Ratio 07/17/2023 0.34   Final    AV index (prosthetic) 07/17/2023 0.37   Final    MV valve area p 1/2 method 07/17/2023 4.83  cm2 Final    E/A ratio 07/17/2023 0.84   Final    Mean e' 07/17/2023 0.06  m/s Final    E wave deceleration time 07/17/2023 157.17  msec Final    IVRT 07/17/2023 62.80  msec Final    LVOT diameter 07/17/2023 2.02  cm Final    LVOT area 07/17/2023 3.2  cm2 Final    LVOT peak elmo 07/17/2023 0.77  m/s Final    LVOT peak VTI 07/17/2023 16.80  cm Final    Ao peak elmo 07/17/2023 2.24  m/s Final    Ao VTI 07/17/2023 45.1  cm Final    LVOT stroke volume 07/17/2023 53.81  cm3 Final    AV peak gradient 07/17/2023 20  mmHg Final    E/E' ratio 07/17/2023 13.67  m/s Final    MV Peak E Elmo 07/17/2023 0.82  m/s Final    AR Max Elmo 07/17/2023 3.79  m/s Final    TR Max Elmo 07/17/2023 2.99  m/s Final    MV stenosis pressure 1/2 time 07/17/2023 45.58  ms Final    MV Peak A Elmo 07/17/2023 0.98  m/s Final    LV Systolic Volume 07/17/2023 52.81  mL Final    LV Diastolic Volume 07/17/2023 113.19  mL Final    RA Major Axis 07/17/2023 6.18  cm Final    Left Atrium Minor Axis 07/17/2023 7.24  cm Final    Left Atrium Major  Axis 07/17/2023 7.56  cm Final    Triscuspid Valve Regurgitation Pea* 07/17/2023 36  mmHg Final    LA volume (mod) 07/17/2023 91.46  cm3 Final    RA Width 07/17/2023 3.80  cm Final    Right Atrial Pressure (from IVC) 07/17/2023 3  mmHg Final    EF 07/17/2023 50  % Final    TV resting pulmonary artery pressu* 07/17/2023 39  mmHg Final       Imaging  No results found.    Assessment  1. Chronic diastolic heart failure  Compensated    2. Nonrheumatic aortic valve stenosis  Moderate    3. Primary hypertension  Controlled    4. Hypercholesterolemia  Controlled  - simvastatin (ZOCOR) 80 MG tablet; Take 1 tablet (80 mg total) by mouth every evening.  Dispense: 30 tablet; Refill: 0      Plan and Discussion    No change to current guideline directed medical therapy.  Discussed continue to monitor his dietary sodium intake.  Reviewed Lasix based on weight.    The 10-year ASCVD risk score (Debra DK, et al., 2019) is: 27.5%    Values used to calculate the score:      Age: 77 years      Sex: Male      Is Non- : No      Diabetic: No      Tobacco smoker: No      Systolic Blood Pressure: 114 mmHg      Is BP treated: Yes      HDL Cholesterol: 55 mg/dL      Total Cholesterol: 143 mg/dL     Follow Up  Follow up in about 6 months (around 5/15/2024).      Alberto Montes MD

## 2023-12-06 DIAGNOSIS — I10 ESSENTIAL HYPERTENSION: ICD-10-CM

## 2023-12-06 RX ORDER — IRBESARTAN 300 MG/1
300 TABLET ORAL DAILY
Qty: 90 TABLET | Refills: 1 | Status: SHIPPED | OUTPATIENT
Start: 2023-12-06

## 2023-12-07 DIAGNOSIS — E78.00 HYPERCHOLESTEROLEMIA: ICD-10-CM

## 2023-12-07 RX ORDER — SIMVASTATIN 80 MG/1
80 TABLET, FILM COATED ORAL NIGHTLY
Qty: 90 TABLET | Refills: 3 | Status: SHIPPED | OUTPATIENT
Start: 2023-12-07

## 2024-01-08 ENCOUNTER — OFFICE VISIT (OUTPATIENT)
Dept: CARDIOLOGY | Facility: CLINIC | Age: 79
End: 2024-01-08
Payer: MEDICARE

## 2024-01-08 VITALS
DIASTOLIC BLOOD PRESSURE: 64 MMHG | OXYGEN SATURATION: 95 % | HEART RATE: 80 BPM | SYSTOLIC BLOOD PRESSURE: 120 MMHG | BODY MASS INDEX: 30.87 KG/M2 | WEIGHT: 234 LBS

## 2024-01-08 DIAGNOSIS — I35.0 NONRHEUMATIC AORTIC VALVE STENOSIS: ICD-10-CM

## 2024-01-08 DIAGNOSIS — I10 PRIMARY HYPERTENSION: ICD-10-CM

## 2024-01-08 DIAGNOSIS — E78.00 HYPERCHOLESTEROLEMIA: ICD-10-CM

## 2024-01-08 DIAGNOSIS — I50.32 CHRONIC DIASTOLIC HEART FAILURE: Primary | ICD-10-CM

## 2024-01-08 PROCEDURE — 99999 PR PBB SHADOW E&M-EST. PATIENT-LVL III: CPT | Mod: PBBFAC,,, | Performed by: INTERNAL MEDICINE

## 2024-01-08 PROCEDURE — 99213 OFFICE O/P EST LOW 20 MIN: CPT | Mod: PBBFAC | Performed by: INTERNAL MEDICINE

## 2024-01-08 PROCEDURE — 99214 OFFICE O/P EST MOD 30 MIN: CPT | Mod: S$PBB,,, | Performed by: INTERNAL MEDICINE

## 2024-01-08 NOTE — PROGRESS NOTES
[facility]    Chief Complaint  Chief Complaint   Patient presents with    Congestive Heart Failure       HPI:    For almost a month he has had skin changes of his left ankle.  Has started scaling.  Petechiae which are progressing proximally.  Noting some swelling in his left leg, not in his rate.  Gets better with elevation.  His weight has been steady so he has not been taking Lasix.  No dyspnea.  Recently no scaling starting on the lateral aspect of his right ankle as well.  Not painful.    Medications  Current Outpatient Medications   Medication Sig Dispense Refill    cholecalciferol, vitamin D3, (VITAMIN D3) 25 mcg (1,000 unit) capsule Take 1,000 Units by mouth once daily.      colchicine (MITIGARE) 0.6 mg Cap Take 1 capsule (0.6 mg total) by mouth once daily. 30 capsule 5    cyanocobalamin 500 MCG tablet Take 500 mcg by mouth once daily.      febuxostat (ULORIC) 80 mg Tab TAKE 1 TABLET DAILY 90 tablet 3    indomethacin (INDOCIN) 25 MG capsule TAKE 1 CAPSULE BY MOUTH TWICE A DAY AS NEEDED 14 capsule 0    irbesartan (AVAPRO) 300 MG tablet Take 1 tablet (300 mg total) by mouth once daily. 90 tablet 1    pantoprazole (PROTONIX) 40 MG tablet TAKE 1 TABLET BY MOUTH EVERY DAY AS NEEDED 30 tablet 1    potassium citrate (UROCIT-K) 10 mEq (1,080 mg) TbSR Take 1 tablet (10 mEq total) by mouth 3 (three) times daily with meals. 20 tablet 0    simvastatin (ZOCOR) 80 MG tablet Take 1 tablet (80 mg total) by mouth every evening. 90 tablet 3     No current facility-administered medications for this visit.        History  Past Medical History:   Diagnosis Date    Abnormal stress echo 10/12/2015    9/15/2015: Stress Echo: 6:00 min. No CP. ECG negative but frequent VPCs. Echo negative.     Aortic valve stenosis     Family history of premature CAD 01/22/2013    Gout, unspecified 01/22/2013    High cholesterol 01/22/2013    Hypertension, benign 01/22/2013    1995: Diagnosed.     Normal cardiac stress test 01/22/2013     Ureteral stone 2013     Past Surgical History:   Procedure Laterality Date    ADENOIDECTOMY      TONSILLECTOMY       Social History     Socioeconomic History    Marital status:     Number of children: 2   Occupational History    Occupation: [employer]   Tobacco Use    Smoking status: Former     Current packs/day: 0.00     Average packs/day: 2.0 packs/day for 30.0 years (60.0 ttl pk-yrs)     Types: Cigarettes     Start date: 1962     Quit date: 1992     Years since quittin.0    Smokeless tobacco: Never   Substance and Sexual Activity    Alcohol use: Yes     Alcohol/week: 15.0 standard drinks of alcohol     Types: 15 Shots of liquor per week    Drug use: No    Sexual activity: Not Currently   Social History Narrative    Walks 30 min 3x/wks. Weights 2-3/wk.    Goes to [place] every July.    2019 - retired.     Social Determinants of Health     Financial Resource Strain: Low Risk  (2024)    Overall Financial Resource Strain (CARDIA)     Difficulty of Paying Living Expenses: Not very hard   Food Insecurity: No Food Insecurity (2024)    Hunger Vital Sign     Worried About Running Out of Food in the Last Year: Never true     Ran Out of Food in the Last Year: Never true   Transportation Needs: No Transportation Needs (2024)    PRAPARE - Transportation     Lack of Transportation (Medical): No     Lack of Transportation (Non-Medical): No   Physical Activity: Sufficiently Active (2024)    Exercise Vital Sign     Days of Exercise per Week: 6 days     Minutes of Exercise per Session: 50 min   Stress: No Stress Concern Present (2024)    [employer] [place] [employer] - Occupational Stress Questionnaire     Feeling of Stress : Not at all   Social Connections: Unknown (2024)    Social Connection and Isolation Panel [NHANES]     Frequency of Communication with Friends and Family: Twice a week     Frequency of Social Gatherings with Friends and Family: More than three  times a week     Active Member of Clubs or Organizations: Yes     Attends Club or Organization Meetings: More than 4 times per year     Marital Status:    Housing Stability: Low Risk  (1/8/2024)    Housing Stability Vital Sign     Unable to Pay for Housing in the Last Year: No     Number of Places Lived in the Last Year: 1     Unstable Housing in the Last Year: No     Family History   Problem Relation Age of Onset    Cancer Mother     Heart disease Father 51        MI    Diabetes Paternal Grandfather         Allergies  Review of patient's allergies indicates:  No Known Allergies    Review of Systems   Review of Systems   Constitutional: Negative for malaise/fatigue, weight gain and weight loss.   Eyes:  Negative for visual disturbance.   Cardiovascular:  Positive for leg swelling. Negative for chest pain, claudication, cyanosis, dyspnea on exertion, irregular heartbeat, near-syncope, orthopnea, palpitations, paroxysmal nocturnal dyspnea and syncope.   Respiratory:  Negative for cough, hemoptysis, shortness of breath, sleep disturbances due to breathing and wheezing.    Hematologic/Lymphatic: Negative for bleeding problem. Does not bruise/bleed easily.   Skin:  Positive for rash. Negative for poor wound healing.   Musculoskeletal:  Negative for muscle cramps and myalgias.   Gastrointestinal:  Negative for abdominal pain, anorexia, diarrhea, heartburn, hematemesis, hematochezia, melena, nausea and vomiting.   Genitourinary:  Negative for hematuria and nocturia.   Neurological:  Negative for excessive daytime sleepiness, dizziness, focal weakness, light-headedness and weakness.       Physical Exam  Vitals:    01/08/24 1410   BP: 120/64   Pulse: 80     Wt Readings from Last 1 Encounters:   01/08/24 106.1 kg (234 lb)     Physical Exam  Vitals and nursing note reviewed.   Constitutional:       General: He is not in acute distress.     Appearance: He is not toxic-appearing or diaphoretic.   HENT:      Head:  Normocephalic and atraumatic.      Mouth/Throat:      Lips: Pink.      Mouth: Mucous membranes are moist.   Eyes:      General: No scleral icterus.     Conjunctiva/sclera: Conjunctivae normal.   Neck:      Thyroid: No thyromegaly.      Vascular: No carotid bruit, hepatojugular reflux or JVD.      Trachea: Trachea normal.   Cardiovascular:      Rate and Rhythm: Normal rate and regular rhythm. No extrasystoles are present.     Chest Wall: PMI is not displaced.      Pulses:           Carotid pulses are 2+ on the right side and 2+ on the left side.       Radial pulses are 2+ on the right side and 2+ on the left side.        Dorsalis pedis pulses are 2+ on the right side and 2+ on the left side.      Heart sounds: S1 normal and S2 normal. Murmur heard.      Harsh midsystolic murmur is present with a grade of 2/6 at the upper right sternal border radiating to the neck.      No friction rub. No S3 or S4 sounds.   Pulmonary:      Effort: Pulmonary effort is normal. No tachypnea, bradypnea, accessory muscle usage or respiratory distress.      Breath sounds: Normal breath sounds and air entry. No decreased breath sounds, wheezing, rhonchi or rales.   Abdominal:      General: Bowel sounds are normal. There is no distension or abdominal bruit.      Palpations: Abdomen is soft. There is no hepatomegaly, splenomegaly or pulsatile mass.      Tenderness: There is no abdominal tenderness.   Musculoskeletal:         General: No tenderness or deformity.      Right lower leg: No edema.      Left lower leg: No edema.      Right ankle: Swelling present.   Skin:     General: Skin is warm and dry.      Capillary Refill: Capillary refill takes less than 2 seconds.      Coloration: Skin is not cyanotic or pale.      Findings: Petechiae and rash present. Rash is scaling.      Nails: There is no clubbing.   Neurological:      General: No focal deficit present.      Mental Status: He is alert and oriented to person, place, and time.    Psychiatric:         Attention and Perception: Attention normal.         Mood and Affect: Mood normal.         Speech: Speech normal.         Behavior: Behavior normal. Behavior is cooperative.         Labs  Lab Visit on 09/12/2023   Component Date Value Ref Range Status    Sodium 09/12/2023 138  136 - 145 mmol/L Final    Potassium 09/12/2023 5.2 (H)  3.5 - 5.1 mmol/L Final    Chloride 09/12/2023 106  95 - 110 mmol/L Final    CO2 09/12/2023 24  23 - 29 mmol/L Final    Glucose 09/12/2023 97  70 - 110 mg/dL Final    BUN 09/12/2023 20  8 - 23 mg/dL Final    Creatinine 09/12/2023 1.2  0.5 - 1.4 mg/dL Final    Calcium 09/12/2023 9.7  8.7 - 10.5 mg/dL Final    Total Protein 09/12/2023 6.9  6.0 - 8.4 g/dL Final    Albumin 09/12/2023 3.8  3.5 - 5.2 g/dL Final    Total Bilirubin 09/12/2023 0.7  0.1 - 1.0 mg/dL Final    Comment: For infants and newborns, interpretation of results should be based  on gestational age, weight and in agreement with clinical  observations.    Premature Infant recommended reference ranges:  Up to 24 hours.............<8.0 mg/dL  Up to 48 hours............<12.0 mg/dL  3-5 days..................<15.0 mg/dL  6-29 days.................<15.0 mg/dL      Alkaline Phosphatase 09/12/2023 53 (L)  55 - 135 U/L Final    AST 09/12/2023 21  10 - 40 U/L Final    ALT 09/12/2023 20  10 - 44 U/L Final    eGFR 09/12/2023 >60  >60 mL/min/1.73 m^2 Final    Anion Gap 09/12/2023 8  8 - 16 mmol/L Final    Cholesterol 09/12/2023 143  120 - 199 mg/dL Final    Comment: The National Cholesterol Education Program (NCEP) has set the  following guidelines (reference ranges) for Cholesterol:  Optimal.....................<200 mg/dL  Borderline High.............200-239 mg/dL  High........................> or = 240 mg/dL      Triglycerides 09/12/2023 46  30 - 150 mg/dL Final    Comment: The National Cholesterol Education Program (NCEP) has set the  following guidelines (reference values) for  triglycerides:  Normal......................<150 mg/dL  Borderline High.............150-199 mg/dL  High........................200-499 mg/dL      HDL 09/12/2023 55  40 - 75 mg/dL Final    Comment: The National Cholesterol Education Program (NCEP) has set the  following guidelines (reference values) for HDL Cholesterol:  Low...............<40 mg/dL  Optimal...........>60 mg/dL      LDL Cholesterol 09/12/2023 78.8  63.0 - 159.0 mg/dL Final    Comment: The National Cholesterol Education Program (NCEP) has set the  following guidelines (reference values) for LDL Cholesterol:  Optimal.......................<130 mg/dL  Borderline High...............130-159 mg/dL  High..........................160-189 mg/dL  Very High.....................>190 mg/dL      HDL/Cholesterol Ratio 09/12/2023 38.5  20.0 - 50.0 % Final    Total Cholesterol/HDL Ratio 09/12/2023 2.6  2.0 - 5.0 Final    Non-HDL Cholesterol 09/12/2023 88  mg/dL Final    Comment: Risk category and Non-HDL cholesterol goals:  Coronary heart disease (CHD)or equivalent (10-year risk of CHD >20%):  Non-HDL cholesterol goal     <130 mg/dL  Two or more CHD risk factors and 10-year risk of CHD <= 20%:  Non-HDL cholesterol goal     <160 mg/dL  0 to 1 CHD risk factor:  Non-HDL cholesterol goal     <190 mg/dL      WBC 09/12/2023 5.39  3.90 - 12.70 K/uL Final    RBC 09/12/2023 4.01 (L)  4.60 - 6.20 M/uL Final    Hemoglobin 09/12/2023 13.2 (L)  14.0 - 18.0 g/dL Final    Hematocrit 09/12/2023 40.2  40.0 - 54.0 % Final    MCV 09/12/2023 100 (H)  82 - 98 fL Final    MCH 09/12/2023 32.9 (H)  27.0 - 31.0 pg Final    MCHC 09/12/2023 32.8  32.0 - 36.0 g/dL Final    RDW 09/12/2023 11.8  11.5 - 14.5 % Final    Platelets 09/12/2023 134 (L)  150 - 450 K/uL Final    MPV 09/12/2023 10.1  9.2 - 12.9 fL Final    Immature Granulocytes 09/12/2023 0.6 (H)  0.0 - 0.5 % Final    Gran # (ANC) 09/12/2023 3.6  1.8 - 7.7 K/uL Final    Immature Grans (Abs) 09/12/2023 0.03  0.00 - 0.04 K/uL Final     Comment: Mild elevation in immature granulocytes is non specific and   can be seen in a variety of conditions including stress response,   acute inflammation, trauma and pregnancy. Correlation with other   laboratory and clinical findings is essential.      Lymph # 09/12/2023 0.9 (L)  1.0 - 4.8 K/uL Final    Mono # 09/12/2023 0.7  0.3 - 1.0 K/uL Final    Eos # 09/12/2023 0.1  0.0 - 0.5 K/uL Final    Baso # 09/12/2023 0.04  0.00 - 0.20 K/uL Final    nRBC 09/12/2023 0  0 /100 WBC Final    Gran % 09/12/2023 66.4  38.0 - 73.0 % Final    Lymph % 09/12/2023 17.1 (L)  18.0 - 48.0 % Final    Mono % 09/12/2023 13.2  4.0 - 15.0 % Final    Eosinophil % 09/12/2023 2.0  0.0 - 8.0 % Final    Basophil % 09/12/2023 0.7  0.0 - 1.9 % Final    Differential Method 09/12/2023 Automated   Final    Hemoglobin A1C 09/12/2023 5.8 (H)  4.0 - 5.6 % Final    Comment: ADA Screening Guidelines:  5.7-6.4%  Consistent with prediabetes  >or=6.5%  Consistent with diabetes    High levels of fetal hemoglobin interfere with the HbA1C  assay. Heterozygous hemoglobin variants (HbS, HgC, etc)do  not significantly interfere with this assay.   However, presence of multiple variants may affect accuracy.      Estimated Avg Glucose 09/12/2023 120  68 - 131 mg/dL Final    TSH 09/12/2023 2.110  0.400 - 4.000 uIU/mL Final    Vitamin B-12 09/12/2023 762  210 - 950 pg/mL Final    Vit D, 25-Hydroxy 09/12/2023 49  30 - 96 ng/mL Final    Comment: Vitamin D deficiency.........<10 ng/mL                              Vitamin D insufficiency......10-29 ng/mL       Vitamin D sufficiency........> or equal to 30 ng/mL  Vitamin D toxicity............>100 ng/mL      BNP 09/12/2023 119 (H)  0 - 99 pg/mL Final    Values of less than 100 pg/ml are consistent with non-CHF populations.    Uric Acid 09/12/2023 4.0  3.4 - 7.0 mg/dL Final   Lab Visit on 08/15/2023   Component Date Value Ref Range Status    Sodium 08/15/2023 135 (L)  136 - 145 mmol/L Final    Potassium 08/15/2023 4.6   3.5 - 5.1 mmol/L Final    Chloride 08/15/2023 100  95 - 110 mmol/L Final    CO2 08/15/2023 23  23 - 29 mmol/L Final    Glucose 08/15/2023 106  70 - 110 mg/dL Final    BUN 08/15/2023 33 (H)  8 - 23 mg/dL Final    Creatinine 08/15/2023 1.5 (H)  0.5 - 1.4 mg/dL Final    Calcium 08/15/2023 9.4  8.7 - 10.5 mg/dL Final    Anion Gap 08/15/2023 12  8 - 16 mmol/L Final    eGFR 08/15/2023 47 (A)  >60 mL/min/1.73 m^2 Final    Magnesium 08/15/2023 1.6  1.6 - 2.6 mg/dL Final   Lab Visit on 07/25/2023   Component Date Value Ref Range Status    Sodium 07/25/2023 139  136 - 145 mmol/L Final    Potassium 07/25/2023 4.5  3.5 - 5.1 mmol/L Final    Chloride 07/25/2023 107  95 - 110 mmol/L Final    CO2 07/25/2023 22 (L)  23 - 29 mmol/L Final    Glucose 07/25/2023 107  70 - 110 mg/dL Final    BUN 07/25/2023 19  8 - 23 mg/dL Final    Creatinine 07/25/2023 1.2  0.5 - 1.4 mg/dL Final    Calcium 07/25/2023 10.0  8.7 - 10.5 mg/dL Final    Anion Gap 07/25/2023 10  8 - 16 mmol/L Final    eGFR 07/25/2023 >60  >60 mL/min/1.73 m^2 Final    BNP 07/25/2023 91  0 - 99 pg/mL Final    Values of less than 100 pg/ml are consistent with non-CHF populations.    Magnesium 07/25/2023 1.7  1.6 - 2.6 mg/dL Final   Hospital Outpatient Visit on 07/17/2023   Component Date Value Ref Range Status    TDI SEPTAL 07/17/2023 0.06  m/s Final    LV LATERAL E/E' RATIO 07/17/2023 13.67  m/s Final    LV SEPTAL E/E' RATIO 07/17/2023 13.67  m/s Final    LA WIDTH 07/17/2023 4.30  cm Final    IVC diameter 07/17/2023 1.94  cm Final    Left Ventricular Outflow Tract Sophia* 07/17/2023 0.55  cm/s Final    Left Ventricular Outflow Tract Sophia* 07/17/2023 1.35  mmHg Final    Pulmonary Valve Mean Velocity 07/17/2023 0.77  m/s Final    TDI LATERAL 07/17/2023 0.06  m/s Final    PV PEAK VELOCITY 07/17/2023 1.24  cm/s Final    LVIDd 07/17/2023 4.91  3.5 - 6.0 cm Final    IVS 07/17/2023 1.31 (A)  0.6 - 1.1 cm Final    Posterior Wall 07/17/2023 1.02  0.6 - 1.1 cm Final    Ao root annulus  07/17/2023 2.08  cm Final    LVIDs 07/17/2023 3.55  2.1 - 4.0 cm Final    FS 07/17/2023 28  28 - 44 % Final    LA volume 07/17/2023 133.82  cm3 Final    Sinus 07/17/2023 2.60  cm Final    STJ 07/17/2023 2.74  cm Final    Ascending aorta 07/17/2023 2.84  cm Final    LV mass 07/17/2023 217.86  g Final    LA size 07/17/2023 4.95  cm Final    RVDD 07/17/2023 2.42  cm Final    TAPSE 07/17/2023 1.70  cm Final    Left Ventricle Relative Wall Thick* 07/17/2023 0.42  cm Final    AV regurgitation pressure 1/2 time 07/17/2023 [id number]  ms Final    AV mean gradient 07/17/2023 10  mmHg Final    AV valve area 07/17/2023 1.19  cm2 Final    AV Velocity Ratio 07/17/2023 0.34   Final    AV index (prosthetic) 07/17/2023 0.37   Final    MV valve area p 1/2 method 07/17/2023 4.83  cm2 Final    E/A ratio 07/17/2023 0.84   Final    Mean e' 07/17/2023 0.06  m/s Final    E wave deceleration time 07/17/2023 157.17  msec Final    IVRT 07/17/2023 62.80  msec Final    LVOT diameter 07/17/2023 2.02  cm Final    LVOT area 07/17/2023 3.2  cm2 Final    LVOT peak elmo 07/17/2023 0.77  m/s Final    LVOT peak VTI 07/17/2023 16.80  cm Final    Ao peak elmo 07/17/2023 2.24  m/s Final    Ao VTI 07/17/2023 45.1  cm Final    LVOT stroke volume 07/17/2023 53.81  cm3 Final    AV peak gradient 07/17/2023 20  mmHg Final    E/E' ratio 07/17/2023 13.67  m/s Final    MV Peak E Elmo 07/17/2023 0.82  m/s Final    AR Max Elmo 07/17/2023 3.79  m/s Final    TR Max Elmo 07/17/2023 2.99  m/s Final    MV stenosis pressure 1/2 time 07/17/2023 45.58  ms Final    MV Peak A Elmo 07/17/2023 0.98  m/s Final    LV Systolic Volume 07/17/2023 52.81  mL Final    LV Diastolic Volume 07/17/2023 113.19  mL Final    RA Major Axis 07/17/2023 6.18  cm Final    Left Atrium Minor Axis 07/17/2023 7.24  cm Final    Left Atrium Major Axis 07/17/2023 7.56  cm Final    Triscuspid Valve Regurgitation Pea* 07/17/2023 36  mmHg Final    LA volume (mod) 07/17/2023 91.46  cm3 Final    RA Width  07/17/2023 3.80  cm Final    Right Atrial Pressure (from IVC) 07/17/2023 3  mmHg Final    EF 07/17/2023 50  % Final    TV resting pulmonary artery pressu* 07/17/2023 39  mmHg Final       Imaging  No results found.    Assessment  1. Chronic diastolic heart failure  Compensated    2. Nonrheumatic aortic valve stenosis  Moderate    3. Primary hypertension  Controlled    4. Hypercholesterolemia  Controlled      Plan and Discussion    Does not look volume overloaded.  I have no problem though with a trial of Lasix once daily for 3 days.  He is going to see a dermatologist tomorrow.  Will let me know if the dermatologist thinks this is anything vascular.    The ASCVD Risk score (Debra DK, et al., 2019) failed to calculate for the following reasons:    The 2019 ASCVD risk score is only valid for ages 40 to 79     Follow Up  As scheduled      Alberto Montes MD

## 2024-01-09 DIAGNOSIS — M1A.09X0 IDIOPATHIC CHRONIC GOUT OF MULTIPLE SITES WITHOUT TOPHUS: ICD-10-CM

## 2024-01-09 RX ORDER — INDOMETHACIN 25 MG/1
CAPSULE ORAL
Qty: 14 CAPSULE | Refills: 0 | Status: SHIPPED | OUTPATIENT
Start: 2024-01-09

## 2024-01-29 RX ORDER — CELECOXIB 200 MG/1
200 CAPSULE ORAL
COMMUNITY
Start: 2023-06-28 | End: 2024-01-29 | Stop reason: SDUPTHER

## 2024-01-29 RX ORDER — CELECOXIB 200 MG/1
200 CAPSULE ORAL DAILY PRN
Qty: 30 CAPSULE | Refills: 0 | Status: SHIPPED | OUTPATIENT
Start: 2024-01-29 | End: 2024-03-11

## 2024-03-11 RX ORDER — CELECOXIB 200 MG/1
200 CAPSULE ORAL DAILY PRN
Qty: 30 CAPSULE | Refills: 0 | Status: SHIPPED | OUTPATIENT
Start: 2024-03-11 | End: 2024-04-09

## 2024-03-12 ENCOUNTER — OFFICE VISIT (OUTPATIENT)
Dept: INTERNAL MEDICINE | Facility: CLINIC | Age: 79
End: 2024-03-12
Attending: INTERNAL MEDICINE
Payer: MEDICARE

## 2024-03-12 VITALS
HEART RATE: 78 BPM | OXYGEN SATURATION: 96 % | BODY MASS INDEX: 30.75 KG/M2 | DIASTOLIC BLOOD PRESSURE: 60 MMHG | SYSTOLIC BLOOD PRESSURE: 128 MMHG | HEIGHT: 73 IN | WEIGHT: 232 LBS

## 2024-03-12 DIAGNOSIS — M1A.09X0 IDIOPATHIC CHRONIC GOUT OF MULTIPLE SITES WITHOUT TOPHUS: ICD-10-CM

## 2024-03-12 DIAGNOSIS — E66.9 OBESITY (BMI 30.0-34.9): ICD-10-CM

## 2024-03-12 DIAGNOSIS — Z13.31 SCREENING FOR DEPRESSION: ICD-10-CM

## 2024-03-12 DIAGNOSIS — Z13.39 SCREENING FOR ALCOHOLISM: ICD-10-CM

## 2024-03-12 DIAGNOSIS — E78.00 HIGH CHOLESTEROL: Primary | ICD-10-CM

## 2024-03-12 DIAGNOSIS — M25.561 ACUTE PAIN OF RIGHT KNEE: ICD-10-CM

## 2024-03-12 DIAGNOSIS — I10 ESSENTIAL HYPERTENSION: ICD-10-CM

## 2024-03-12 PROCEDURE — G0447 BEHAVIOR COUNSEL OBESITY 15M: HCPCS | Mod: 59,S$GLB,, | Performed by: INTERNAL MEDICINE

## 2024-03-12 PROCEDURE — G0444 DEPRESSION SCREEN ANNUAL: HCPCS | Mod: 59,S$GLB,, | Performed by: INTERNAL MEDICINE

## 2024-03-12 PROCEDURE — 99214 OFFICE O/P EST MOD 30 MIN: CPT | Mod: S$GLB,,, | Performed by: INTERNAL MEDICINE

## 2024-03-12 PROCEDURE — G0442 ANNUAL ALCOHOL SCREEN 15 MIN: HCPCS | Mod: 59,S$GLB,, | Performed by: INTERNAL MEDICINE

## 2024-03-12 NOTE — PROGRESS NOTES
Subjective     Patient ID: Av Dorantes is a 78 y.o. male.    Chief Complaint: Follow-up and Hyperlipidemia    Twisted his right knee 3 weeks ago.  He is seeing Dr. Gonzáles.  He is scheduled for an MRI.  It is getting better.      Hypertension  This is a chronic problem. The current episode started more than 1 year ago. The problem is controlled.           Follow-up  This is a chronic problem. The current episode started more than 1 year ago. Associated symptoms include arthralgias.   Hyperlipidemia      Review of Systems   Constitutional: Negative.    Respiratory: Negative.     Cardiovascular: Negative.    Musculoskeletal:  Positive for arthralgias.          Objective     Physical Exam  Constitutional:       Appearance: He is well-developed.   HENT:      Head: Normocephalic and atraumatic.   Eyes:      Pupils: Pupils are equal, round, and reactive to light.   Cardiovascular:      Rate and Rhythm: Normal rate and regular rhythm.      Heart sounds: Murmur heard.      Crescendo decrescendo systolic murmur is present with a grade of 2/6.      Comments: @RUSB  Pulmonary:      Effort: Pulmonary effort is normal.   Neurological:      Mental Status: He is alert.            Assessment and Plan     1. High cholesterol  Overview:  LDL: 220      2. Idiopathic chronic gout of multiple sites without tophus  Overview:  Right foot      3. Essential hypertension    4. Obesity (BMI 30.0-34.9)    5. Screening for alcoholism    6. Screening for depression        PLAN:  Per orders and D/C instructions.  Continue diet and/or meds for gout, obesity, HTN, and high cholesterol, which are stable.  Follow-up with ortho for right knee pain.    Screening: The patient was screened for depression with the PHQ2 questionnaire and possible health consequences were discussed with the patient, who understands (15 minutes spent).       The patient was screened for the misuse of alcohol, by asking the number of drinks per average week, and if pt has  had more than 4 drinks (more than 3 for women and elderly) in 1 day within the past year. The health and legal consequences of misuse were discussed (15 minutes spent).       The patient was screened for obesity (BMI>30), Since the current BMI is > 30, the possible consequences of obesity, as well as the benefits of diet, exercise, and weight loss were discussed. Any behavioral risks were identified, and methods to achieve appropriate treatment goals were discussed (15 minutes spent).       Follow up in about 6 months (around 9/12/2024).

## 2024-04-09 RX ORDER — CELECOXIB 200 MG/1
200 CAPSULE ORAL DAILY PRN
Qty: 30 CAPSULE | Refills: 0 | Status: SHIPPED | OUTPATIENT
Start: 2024-04-09 | End: 2024-05-08

## 2024-05-08 RX ORDER — CELECOXIB 200 MG/1
200 CAPSULE ORAL DAILY PRN
Qty: 30 CAPSULE | Refills: 0 | Status: SHIPPED | OUTPATIENT
Start: 2024-05-08 | End: 2024-06-05

## 2024-05-15 ENCOUNTER — OFFICE VISIT (OUTPATIENT)
Dept: CARDIOLOGY | Facility: CLINIC | Age: 79
End: 2024-05-15
Payer: MEDICARE

## 2024-05-15 VITALS
DIASTOLIC BLOOD PRESSURE: 70 MMHG | HEIGHT: 73 IN | WEIGHT: 233.25 LBS | BODY MASS INDEX: 30.91 KG/M2 | HEART RATE: 87 BPM | SYSTOLIC BLOOD PRESSURE: 100 MMHG | OXYGEN SATURATION: 95 %

## 2024-05-15 DIAGNOSIS — I10 PRIMARY HYPERTENSION: ICD-10-CM

## 2024-05-15 DIAGNOSIS — I50.32 CHRONIC DIASTOLIC HEART FAILURE: ICD-10-CM

## 2024-05-15 DIAGNOSIS — E78.00 HYPERCHOLESTEROLEMIA: ICD-10-CM

## 2024-05-15 DIAGNOSIS — I35.0 NONRHEUMATIC AORTIC VALVE STENOSIS: Primary | ICD-10-CM

## 2024-05-15 PROCEDURE — 99213 OFFICE O/P EST LOW 20 MIN: CPT | Mod: PBBFAC,25 | Performed by: INTERNAL MEDICINE

## 2024-05-15 PROCEDURE — 99214 OFFICE O/P EST MOD 30 MIN: CPT | Mod: S$PBB,,, | Performed by: INTERNAL MEDICINE

## 2024-05-15 PROCEDURE — 99999 PR PBB SHADOW E&M-EST. PATIENT-LVL III: CPT | Mod: PBBFAC,,, | Performed by: INTERNAL MEDICINE

## 2024-05-15 PROCEDURE — 93005 ELECTROCARDIOGRAM TRACING: CPT

## 2024-05-15 PROCEDURE — 93010 ELECTROCARDIOGRAM REPORT: CPT | Mod: ,,, | Performed by: INTERNAL MEDICINE

## 2024-05-15 NOTE — PROGRESS NOTES
[facility]    Chief Complaint  Chief Complaint   Patient presents with    Valvular Heart Disease       HPI:    Exertional dyspnea is about the same.  Still working with a  5-6 days per week.  No change in his exercise capacity.  Takes Lasix for a couple of days about every other week based on his weight and edema.    Medications  Current Outpatient Medications   Medication Sig Dispense Refill    celecoxib (CELEBREX) 200 MG capsule TAKE 1 CAPSULE (200 MG TOTAL) BY MOUTH DAILY AS NEEDED FOR PAIN. TAKE WITH FOOD. 30 capsule 0    cholecalciferol, vitamin D3, (VITAMIN D3) 25 mcg (1,000 unit) capsule Take 1,000 Units by mouth once daily.      colchicine (MITIGARE) 0.6 mg Cap Take 1 capsule (0.6 mg total) by mouth once daily. 30 capsule 5    cyanocobalamin 500 MCG tablet Take 500 mcg by mouth once daily.      febuxostat (ULORIC) 80 mg Tab TAKE 1 TABLET DAILY 90 tablet 3    indomethacin (INDOCIN) 25 MG capsule TAKE 1 CAPSULE BY MOUTH TWICE A DAY AS NEEDED 14 capsule 0    irbesartan (AVAPRO) 300 MG tablet Take 1 tablet (300 mg total) by mouth once daily. 90 tablet 1    pantoprazole (PROTONIX) 40 MG tablet TAKE 1 TABLET BY MOUTH EVERY DAY AS NEEDED 30 tablet 1    simvastatin (ZOCOR) 80 MG tablet Take 1 tablet (80 mg total) by mouth every evening. 90 tablet 3     No current facility-administered medications for this visit.        History  Past Medical History:   Diagnosis Date    Abnormal stress echo 10/12/2015    9/15/2015: Stress Echo: 6:00 min. No CP. ECG negative but frequent VPCs. Echo negative.     Aortic valve stenosis     Family history of premature CAD 01/22/2013    Gout, unspecified 01/22/2013    High cholesterol 01/22/2013    Hypertension, benign 01/22/2013    1995: Diagnosed.     Normal cardiac stress test 01/22/2013    Ureteral stone 01/22/2013     Past Surgical History:   Procedure Laterality Date    ADENOIDECTOMY      TONSILLECTOMY       Social History     Socioeconomic History    Marital  status:     Number of children: 2   Occupational History    Occupation: [employer]   Tobacco Use    Smoking status: Former     Current packs/day: 0.00     Average packs/day: 2.0 packs/day for 30.0 years (60.0 ttl pk-yrs)     Types: Cigarettes     Start date: 1962     Quit date: 1992     Years since quittin.3    Smokeless tobacco: Never   Substance and Sexual Activity    Alcohol use: Yes     Alcohol/week: 15.0 standard drinks of alcohol     Types: 15 Shots of liquor per week    Drug use: No    Sexual activity: Not Currently   Social History Narrative    Walks 30 min 3x/wks. Weights 2-3/wk.    Goes to [place] every July.    2019 - retired.     Social Determinants of Health     Financial Resource Strain: Low Risk  (2024)    Overall Financial Resource Strain (CARDIA)     Difficulty of Paying Living Expenses: Not very hard   Food Insecurity: No Food Insecurity (2024)    Hunger Vital Sign     Worried About Running Out of Food in the Last Year: Never true     Ran Out of Food in the Last Year: Never true   Transportation Needs: No Transportation Needs (2024)    PRAPARE - Transportation     Lack of Transportation (Medical): No     Lack of Transportation (Non-Medical): No   Physical Activity: Sufficiently Active (2024)    Exercise Vital Sign     Days of Exercise per Week: 6 days     Minutes of Exercise per Session: 40 min   Stress: No Stress Concern Present (2024)    [employer] [place] [employer] - Occupational Stress Questionnaire     Feeling of Stress : Not at all   Housing Stability: Low Risk  (2024)    Housing Stability Vital Sign     Unable to Pay for Housing in the Last Year: No     Number of Places Lived in the Last Year: 1     Unstable Housing in the Last Year: No     Family History   Problem Relation Name Age of Onset    Cancer Mother      Heart disease Father  51        MI    Diabetes Paternal Grandfather          Allergies  Review of patient's allergies  indicates:  No Known Allergies    Review of Systems   Review of Systems   Constitutional: Negative for malaise/fatigue, weight gain and weight loss.   Eyes:  Negative for visual disturbance.   Cardiovascular:  Negative for chest pain, claudication, cyanosis, dyspnea on exertion, irregular heartbeat, leg swelling, near-syncope, orthopnea, palpitations, paroxysmal nocturnal dyspnea and syncope.   Respiratory:  Negative for cough, hemoptysis, shortness of breath, sleep disturbances due to breathing and wheezing.    Hematologic/Lymphatic: Negative for bleeding problem. Does not bruise/bleed easily.   Skin:  Negative for poor wound healing.   Musculoskeletal:  Negative for muscle cramps and myalgias.   Gastrointestinal:  Negative for abdominal pain, anorexia, diarrhea, heartburn, hematemesis, hematochezia, melena, nausea and vomiting.   Genitourinary:  Negative for hematuria and nocturia.   Neurological:  Negative for excessive daytime sleepiness, dizziness, focal weakness, light-headedness and weakness.       Physical Exam  Vitals:    05/15/24 1533   BP: 100/70   Pulse: 87     Wt Readings from Last 1 Encounters:   05/15/24 105.8 kg (233 lb 4 oz)     Physical Exam  Vitals and nursing note reviewed.   Constitutional:       General: He is not in acute distress.     Appearance: He is not toxic-appearing or diaphoretic.   HENT:      Head: Normocephalic and atraumatic.      Mouth/Throat:      Lips: Pink.      Mouth: Mucous membranes are moist.   Eyes:      General: No scleral icterus.     Conjunctiva/sclera: Conjunctivae normal.   Neck:      Thyroid: No thyromegaly.      Vascular: No carotid bruit, hepatojugular reflux or JVD.      Trachea: Trachea normal.   Cardiovascular:      Rate and Rhythm: Normal rate and regular rhythm. No extrasystoles are present.     Chest Wall: PMI is not displaced.      Pulses:           Carotid pulses are 2+ on the right side and 2+ on the left side.       Radial pulses are 2+ on the right side  and 2+ on the left side.        Dorsalis pedis pulses are 2+ on the right side and 2+ on the left side.      Heart sounds: S1 normal and S2 normal. Murmur heard.      Harsh midsystolic murmur is present with a grade of 2/6 at the upper right sternal border radiating to the neck.      No friction rub. No S3 or S4 sounds.   Pulmonary:      Effort: Pulmonary effort is normal. No tachypnea, bradypnea, accessory muscle usage or respiratory distress.      Breath sounds: Normal breath sounds and air entry. No decreased breath sounds, wheezing, rhonchi or rales.   Abdominal:      General: Bowel sounds are normal. There is no distension or abdominal bruit.      Palpations: Abdomen is soft. There is no hepatomegaly, splenomegaly or pulsatile mass.      Tenderness: There is no abdominal tenderness.   Musculoskeletal:         General: No tenderness or deformity.      Right lower leg: No edema.      Left lower leg: No edema.   Skin:     General: Skin is warm and dry.      Capillary Refill: Capillary refill takes less than 2 seconds.      Coloration: Skin is not cyanotic or pale.      Findings: Petechiae present.      Nails: There is no clubbing.   Neurological:      General: No focal deficit present.      Mental Status: He is alert and oriented to person, place, and time.   Psychiatric:         Attention and Perception: Attention normal.         Mood and Affect: Mood normal.         Speech: Speech normal.         Behavior: Behavior normal. Behavior is cooperative.         Labs  No visits with results within 6 Month(s) from this visit.   Latest known visit with results is:   Lab Visit on 09/12/2023   Component Date Value Ref Range Status    Sodium 09/12/2023 138  136 - 145 mmol/L Final    Potassium 09/12/2023 5.2 (H)  3.5 - 5.1 mmol/L Final    Chloride 09/12/2023 106  95 - 110 mmol/L Final    CO2 09/12/2023 24  23 - 29 mmol/L Final    Glucose 09/12/2023 97  70 - 110 mg/dL Final    BUN 09/12/2023 20  8 - 23 mg/dL Final     Creatinine 09/12/2023 1.2  0.5 - 1.4 mg/dL Final    Calcium 09/12/2023 9.7  8.7 - 10.5 mg/dL Final    Total Protein 09/12/2023 6.9  6.0 - 8.4 g/dL Final    Albumin 09/12/2023 3.8  3.5 - 5.2 g/dL Final    Total Bilirubin 09/12/2023 0.7  0.1 - 1.0 mg/dL Final    Comment: For infants and newborns, interpretation of results should be based  on gestational age, weight and in agreement with clinical  observations.    Premature Infant recommended reference ranges:  Up to 24 hours.............<8.0 mg/dL  Up to 48 hours............<12.0 mg/dL  3-5 days..................<15.0 mg/dL  6-29 days.................<15.0 mg/dL      Alkaline Phosphatase 09/12/2023 53 (L)  55 - 135 U/L Final    AST 09/12/2023 21  10 - 40 U/L Final    ALT 09/12/2023 20  10 - 44 U/L Final    eGFR 09/12/2023 >60  >60 mL/min/1.73 m^2 Final    Anion Gap 09/12/2023 8  8 - 16 mmol/L Final    Cholesterol 09/12/2023 143  120 - 199 mg/dL Final    Comment: The National Cholesterol Education Program (NCEP) has set the  following guidelines (reference ranges) for Cholesterol:  Optimal.....................<200 mg/dL  Borderline High.............200-239 mg/dL  High........................> or = 240 mg/dL      Triglycerides 09/12/2023 46  30 - 150 mg/dL Final    Comment: The National Cholesterol Education Program (NCEP) has set the  following guidelines (reference values) for triglycerides:  Normal......................<150 mg/dL  Borderline High.............150-199 mg/dL  High........................200-499 mg/dL      HDL 09/12/2023 55  40 - 75 mg/dL Final    Comment: The National Cholesterol Education Program (NCEP) has set the  following guidelines (reference values) for HDL Cholesterol:  Low...............<40 mg/dL  Optimal...........>60 mg/dL      LDL Cholesterol 09/12/2023 78.8  63.0 - 159.0 mg/dL Final    Comment: The National Cholesterol Education Program (NCEP) has set the  following guidelines (reference values) for LDL  Cholesterol:  Optimal.......................<130 mg/dL  Borderline High...............130-159 mg/dL  High..........................160-189 mg/dL  Very High.....................>190 mg/dL      HDL/Cholesterol Ratio 09/12/2023 38.5  20.0 - 50.0 % Final    Total Cholesterol/HDL Ratio 09/12/2023 2.6  2.0 - 5.0 Final    Non-HDL Cholesterol 09/12/2023 88  mg/dL Final    Comment: Risk category and Non-HDL cholesterol goals:  Coronary heart disease (CHD)or equivalent (10-year risk of CHD >20%):  Non-HDL cholesterol goal     <130 mg/dL  Two or more CHD risk factors and 10-year risk of CHD <= 20%:  Non-HDL cholesterol goal     <160 mg/dL  0 to 1 CHD risk factor:  Non-HDL cholesterol goal     <190 mg/dL      WBC 09/12/2023 5.39  3.90 - 12.70 K/uL Final    RBC 09/12/2023 4.01 (L)  4.60 - 6.20 M/uL Final    Hemoglobin 09/12/2023 13.2 (L)  14.0 - 18.0 g/dL Final    Hematocrit 09/12/2023 40.2  40.0 - 54.0 % Final    MCV 09/12/2023 100 (H)  82 - 98 fL Final    MCH 09/12/2023 32.9 (H)  27.0 - 31.0 pg Final    MCHC 09/12/2023 32.8  32.0 - 36.0 g/dL Final    RDW 09/12/2023 11.8  11.5 - 14.5 % Final    Platelets 09/12/2023 134 (L)  150 - 450 K/uL Final    MPV 09/12/2023 10.1  9.2 - 12.9 fL Final    Immature Granulocytes 09/12/2023 0.6 (H)  0.0 - 0.5 % Final    Gran # (ANC) 09/12/2023 3.6  1.8 - 7.7 K/uL Final    Immature Grans (Abs) 09/12/2023 0.03  0.00 - 0.04 K/uL Final    Comment: Mild elevation in immature granulocytes is non specific and   can be seen in a variety of conditions including stress response,   acute inflammation, trauma and pregnancy. Correlation with other   laboratory and clinical findings is essential.      Lymph # 09/12/2023 0.9 (L)  1.0 - 4.8 K/uL Final    Mono # 09/12/2023 0.7  0.3 - 1.0 K/uL Final    Eos # 09/12/2023 0.1  0.0 - 0.5 K/uL Final    Baso # 09/12/2023 0.04  0.00 - 0.20 K/uL Final    nRBC 09/12/2023 0  0 /100 WBC Final    Gran % 09/12/2023 66.4  38.0 - 73.0 % Final    Lymph % 09/12/2023 17.1 (L)   18.0 - 48.0 % Final    Mono % 09/12/2023 13.2  4.0 - 15.0 % Final    Eosinophil % 09/12/2023 2.0  0.0 - 8.0 % Final    Basophil % 09/12/2023 0.7  0.0 - 1.9 % Final    Differential Method 09/12/2023 Automated   Final    Hemoglobin A1C 09/12/2023 5.8 (H)  4.0 - 5.6 % Final    Comment: ADA Screening Guidelines:  5.7-6.4%  Consistent with prediabetes  >or=6.5%  Consistent with diabetes    High levels of fetal hemoglobin interfere with the HbA1C  assay. Heterozygous hemoglobin variants (HbS, HgC, etc)do  not significantly interfere with this assay.   However, presence of multiple variants may affect accuracy.      Estimated Avg Glucose 09/12/2023 120  68 - 131 mg/dL Final    TSH 09/12/2023 2.110  0.400 - 4.000 uIU/mL Final    Vitamin B-12 09/12/2023 762  210 - 950 pg/mL Final    Vit D, 25-Hydroxy 09/12/2023 49  30 - 96 ng/mL Final    Comment: Vitamin D deficiency.........<10 ng/mL                              Vitamin D insufficiency......10-29 ng/mL       Vitamin D sufficiency........> or equal to 30 ng/mL  Vitamin D toxicity............>100 ng/mL      BNP 09/12/2023 119 (H)  0 - 99 pg/mL Final    Values of less than 100 pg/ml are consistent with non-CHF populations.    Uric Acid 09/12/2023 4.0  3.4 - 7.0 mg/dL Final       Imaging  No results found.    Assessment  1. Nonrheumatic aortic valve stenosis  Moderate by last evaluation  - Echo; Future    2. Chronic diastolic heart failure  Compensated  - Echo; Future    3. Primary hypertension  Controlled    4. Hypercholesterolemia  Controlled      Plan and Discussion    No change to current guideline directed medical therapy.  Echocardiogram prior to next visit.    The ASCVD Risk score (Debra DK, et al., 2019) failed to calculate for the following reasons:    The 2019 ASCVD risk score is only valid for ages 40 to 79     Follow Up  Follow up in about 6 months (around 11/15/2024).      Alberto Montes MD

## 2024-05-16 DIAGNOSIS — M10.9 GOUT, UNSPECIFIED CAUSE, UNSPECIFIED CHRONICITY, UNSPECIFIED SITE: ICD-10-CM

## 2024-05-17 ENCOUNTER — DOCUMENTATION ONLY (OUTPATIENT)
Dept: INTERNAL MEDICINE | Facility: CLINIC | Age: 79
End: 2024-05-17
Payer: MEDICARE

## 2024-05-17 DIAGNOSIS — Z78.9 KNOWN MEDICAL PROBLEMS: ICD-10-CM

## 2024-05-17 DIAGNOSIS — M17.0 PRIMARY OSTEOARTHRITIS OF BOTH KNEES: ICD-10-CM

## 2024-05-17 DIAGNOSIS — I10 ESSENTIAL HYPERTENSION: Primary | ICD-10-CM

## 2024-05-17 DIAGNOSIS — M1A.09X0 IDIOPATHIC CHRONIC GOUT OF MULTIPLE SITES WITHOUT TOPHUS: ICD-10-CM

## 2024-05-17 PROCEDURE — 99490 CHRNC CARE MGMT STAFF 1ST 20: CPT | Mod: S$GLB,,, | Performed by: INTERNAL MEDICINE

## 2024-05-17 RX ORDER — FEBUXOSTAT 80 MG/1
TABLET, FILM COATED ORAL
Qty: 90 TABLET | Refills: 3 | Status: SHIPPED | OUTPATIENT
Start: 2024-05-17

## 2024-05-20 LAB
OHS QRS DURATION: 92 MS
OHS QTC CALCULATION: 441 MS

## 2024-05-22 NOTE — PROGRESS NOTES
The patient's electronic chart was reviewed during this month. The patient's medical, functional, and psychosocial needs were assessed. Need for Home health care, PT, OT, , psychiatric care, and hospice was assessed. All preventative care measures were reviewed and updated. All medications were reviewed and reconciled. Potential drug interactions and medication adherence was reviewed. Prescriptions were renewed as appropriate. Education was provided to the patient and/or caregiver as needed, and all questions were answered. Over 20 minutes were spent providing these non-face-to-face services during this calendar month.     Refilled    febuxostat (ULORIC) 80 mg Tab 90 tablet 3 5/17/2024 -- No   Sig: TAKE 1 TABLET DAILY   Sent to pharmacy as: febuxostat (ULORIC) 80 mg Tab   Class: Normal   Order: [id number]   Date/Time Signed: 5/17/2024 08:54       E-Prescribing Status: Receipt confirmed by pharmacy (5/17/2024  8:54 AM CDT)      Disp Refills Start End ADRIANE   celecoxib (CELEBREX) 200 MG capsule 30 capsule 0 5/8/2024 -- No   Sig - Route: TAKE 1 CAPSULE (200 MG TOTAL) BY MOUTH DAILY AS NEEDED FOR PAIN. TAKE WITH FOOD. - Oral   Sent to pharmacy as: celecoxib (CELEBREX) 200 MG capsule   Class: Normal   Order: [id number]   Date/Time Signed: 5/8/2024 09:12       E-Prescribing Status: Receipt confirmed by pharmacy (5/8/2024  9:13 AM CDT)

## 2024-05-29 DIAGNOSIS — I10 ESSENTIAL HYPERTENSION: ICD-10-CM

## 2024-05-29 RX ORDER — IRBESARTAN 300 MG/1
300 TABLET ORAL
Qty: 90 TABLET | Refills: 1 | Status: SHIPPED | OUTPATIENT
Start: 2024-05-29

## 2024-06-05 ENCOUNTER — DOCUMENTATION ONLY (OUTPATIENT)
Dept: INTERNAL MEDICINE | Facility: CLINIC | Age: 79
End: 2024-06-05
Payer: MEDICARE

## 2024-06-05 DIAGNOSIS — I10 ESSENTIAL HYPERTENSION: Primary | ICD-10-CM

## 2024-06-05 DIAGNOSIS — Z78.9 KNOWN MEDICAL PROBLEMS: ICD-10-CM

## 2024-06-05 DIAGNOSIS — M17.0 PRIMARY OSTEOARTHRITIS OF BOTH KNEES: ICD-10-CM

## 2024-06-05 PROCEDURE — 99490 CHRNC CARE MGMT STAFF 1ST 20: CPT | Mod: S$GLB,,, | Performed by: INTERNAL MEDICINE

## 2024-06-05 RX ORDER — CELECOXIB 200 MG/1
200 CAPSULE ORAL DAILY PRN
Qty: 30 CAPSULE | Refills: 0 | Status: SHIPPED | OUTPATIENT
Start: 2024-06-05

## 2024-06-26 NOTE — PROGRESS NOTES
The patient's electronic chart was reviewed during this month. The patient's medical, functional, and psychosocial needs were assessed. Need for Home health care, PT, OT, , psychiatric care, and hospice was assessed. All preventative care measures were reviewed and updated. All medications were reviewed and reconciled. Potential drug interactions and medication adherence was reviewed. Prescriptions were renewed as appropriate. Education was provided to the patient and/or caregiver as needed, and all questions were answered. Over 20 minutes were spent providing these non-face-to-face services during this calendar month.     Refilled     Disp Refills Start End ADRIANE   celecoxib (CELEBREX) 200 MG capsule 30 capsule 0 6/5/2024 -- No   Sig - Route: TAKE 1 CAPSULE (200 MG TOTAL) BY MOUTH DAILY AS NEEDED FOR PAIN. TAKE WITH FOOD. - Oral   Sent to pharmacy as: celecoxib (CELEBREX) 200 MG capsule   Class: Normal   Order: [id number]   Date/Time Signed: 6/5/2024 09:34       E-Prescribing Status: Receipt confirmed by pharmacy (6/5/2024  9:35 AM CDT)

## 2024-06-27 RX ORDER — CELECOXIB 200 MG/1
200 CAPSULE ORAL DAILY PRN
Qty: 30 CAPSULE | Refills: 0 | Status: SHIPPED | OUTPATIENT
Start: 2024-06-27

## 2024-07-28 ENCOUNTER — DOCUMENTATION ONLY (OUTPATIENT)
Dept: INTERNAL MEDICINE | Facility: CLINIC | Age: 79
End: 2024-07-28
Payer: MEDICARE

## 2024-07-28 DIAGNOSIS — M17.0 PRIMARY OSTEOARTHRITIS OF BOTH KNEES: ICD-10-CM

## 2024-07-28 DIAGNOSIS — Z78.9 KNOWN MEDICAL PROBLEMS: ICD-10-CM

## 2024-07-28 DIAGNOSIS — I10 ESSENTIAL HYPERTENSION: Primary | ICD-10-CM

## 2024-07-28 PROCEDURE — 99490 CHRNC CARE MGMT STAFF 1ST 20: CPT | Mod: S$GLB,,, | Performed by: INTERNAL MEDICINE

## 2024-07-28 RX ORDER — CELECOXIB 200 MG/1
200 CAPSULE ORAL DAILY PRN
Qty: 30 CAPSULE | Refills: 3 | Status: SHIPPED | OUTPATIENT
Start: 2024-07-28

## 2024-07-31 NOTE — PROGRESS NOTES
The patient's electronic chart was reviewed during this month. The patient's medical, functional, and psychosocial needs were assessed. Need for Home health care, PT, OT, , psychiatric care, and hospice was assessed. All preventative care measures were reviewed and updated. All medications were reviewed and reconciled. Potential drug interactions and medication adherence was reviewed. Prescriptions were renewed as appropriate. Education was provided to the patient and/or caregiver as needed, and all questions were answered. Over 20 minutes were spent providing these non-face-to-face services during this calendar month.     Refilled     Disp Refills Start End ADRIANE   celecoxib (CELEBREX) 200 MG capsule 30 capsule 3 7/28/2024 -- No   Sig - Route: TAKE 1 CAPSULE (200 MG TOTAL) BY MOUTH DAILY AS NEEDED FOR PAIN. TAKE WITH FOOD - Oral   Sent to pharmacy as: celecoxib (CELEBREX) 200 MG capsule   Class: Normal   Order: [id number]   Date/Time Signed: 7/28/2024 07:59       E-Prescribing Status: Receipt confirmed by pharmacy (7/28/2024  8:00 AM CDT)

## 2024-09-17 ENCOUNTER — LAB VISIT (OUTPATIENT)
Dept: LAB | Facility: OTHER | Age: 79
End: 2024-09-17
Attending: INTERNAL MEDICINE
Payer: MEDICARE

## 2024-09-17 ENCOUNTER — OFFICE VISIT (OUTPATIENT)
Dept: INTERNAL MEDICINE | Facility: CLINIC | Age: 79
End: 2024-09-17
Attending: INTERNAL MEDICINE
Payer: MEDICARE

## 2024-09-17 VITALS
OXYGEN SATURATION: 97 % | WEIGHT: 222 LBS | DIASTOLIC BLOOD PRESSURE: 58 MMHG | HEIGHT: 73 IN | BODY MASS INDEX: 29.42 KG/M2 | HEART RATE: 72 BPM | SYSTOLIC BLOOD PRESSURE: 106 MMHG

## 2024-09-17 DIAGNOSIS — M1A.09X0 IDIOPATHIC CHRONIC GOUT OF MULTIPLE SITES WITHOUT TOPHUS: ICD-10-CM

## 2024-09-17 DIAGNOSIS — D50.8 OTHER IRON DEFICIENCY ANEMIA: ICD-10-CM

## 2024-09-17 DIAGNOSIS — R79.89 OTHER SPECIFIED ABNORMAL FINDINGS OF BLOOD CHEMISTRY: ICD-10-CM

## 2024-09-17 DIAGNOSIS — I10 ESSENTIAL HYPERTENSION: ICD-10-CM

## 2024-09-17 DIAGNOSIS — E78.9 DISORDER OF LIPID METABOLISM: ICD-10-CM

## 2024-09-17 DIAGNOSIS — D69.6 THROMBOCYTOPENIA, UNSPECIFIED: ICD-10-CM

## 2024-09-17 DIAGNOSIS — M1A.09X0 IDIOPATHIC CHRONIC GOUT OF MULTIPLE SITES WITHOUT TOPHUS: Primary | ICD-10-CM

## 2024-09-17 DIAGNOSIS — Z12.5 SCREENING FOR PROSTATE CANCER: ICD-10-CM

## 2024-09-17 DIAGNOSIS — E55.9 VITAMIN D DEFICIENCY: ICD-10-CM

## 2024-09-17 DIAGNOSIS — D51.0 PERNICIOUS ANEMIA: ICD-10-CM

## 2024-09-17 DIAGNOSIS — E03.9 HYPOTHYROIDISM, UNSPECIFIED TYPE: ICD-10-CM

## 2024-09-17 DIAGNOSIS — E78.00 HIGH CHOLESTEROL: Primary | ICD-10-CM

## 2024-09-17 DIAGNOSIS — R73.9 HYPERGLYCEMIA: ICD-10-CM

## 2024-09-17 LAB
ALBUMIN SERPL BCP-MCNC: 3.9 G/DL (ref 3.5–5.2)
ALP SERPL-CCNC: 57 U/L (ref 55–135)
ALT SERPL W/O P-5'-P-CCNC: 24 U/L (ref 10–44)
ANION GAP SERPL CALC-SCNC: 9 MMOL/L (ref 8–16)
AST SERPL-CCNC: 22 U/L (ref 10–40)
BASOPHILS # BLD AUTO: 0.04 K/UL (ref 0–0.2)
BASOPHILS NFR BLD: 0.6 % (ref 0–1.9)
BILIRUB SERPL-MCNC: 0.8 MG/DL (ref 0.1–1)
BUN SERPL-MCNC: 41 MG/DL (ref 8–23)
CALCIUM SERPL-MCNC: 9.9 MG/DL (ref 8.7–10.5)
CHLORIDE SERPL-SCNC: 108 MMOL/L (ref 95–110)
CHOLEST SERPL-MCNC: 156 MG/DL (ref 120–199)
CHOLEST/HDLC SERPL: 2 {RATIO} (ref 2–5)
CK SERPL-CCNC: 163 U/L (ref 20–200)
CO2 SERPL-SCNC: 22 MMOL/L (ref 23–29)
CREAT SERPL-MCNC: 1.3 MG/DL (ref 0.5–1.4)
DIFFERENTIAL METHOD BLD: ABNORMAL
EOSINOPHIL # BLD AUTO: 0.1 K/UL (ref 0–0.5)
EOSINOPHIL NFR BLD: 2 % (ref 0–8)
ERYTHROCYTE [DISTWIDTH] IN BLOOD BY AUTOMATED COUNT: 11.9 % (ref 11.5–14.5)
EST. GFR  (NO RACE VARIABLE): 56 ML/MIN/1.73 M^2
ESTIMATED AVG GLUCOSE: 111 MG/DL (ref 68–131)
GLUCOSE SERPL-MCNC: 97 MG/DL (ref 70–110)
HBA1C MFR BLD: 5.5 % (ref 4–5.6)
HCT VFR BLD AUTO: 41.3 % (ref 40–54)
HDLC SERPL-MCNC: 79 MG/DL (ref 40–75)
HDLC SERPL: 50.6 % (ref 20–50)
HGB BLD-MCNC: 13.7 G/DL (ref 14–18)
IMM GRANULOCYTES # BLD AUTO: 0.02 K/UL (ref 0–0.04)
IMM GRANULOCYTES NFR BLD AUTO: 0.3 % (ref 0–0.5)
LDLC SERPL CALC-MCNC: 66.4 MG/DL (ref 63–159)
LYMPHOCYTES # BLD AUTO: 1.3 K/UL (ref 1–4.8)
LYMPHOCYTES NFR BLD: 17.9 % (ref 18–48)
MCH RBC QN AUTO: 33.1 PG (ref 27–31)
MCHC RBC AUTO-ENTMCNC: 33.2 G/DL (ref 32–36)
MCV RBC AUTO: 100 FL (ref 82–98)
MONOCYTES # BLD AUTO: 0.8 K/UL (ref 0.3–1)
MONOCYTES NFR BLD: 11.4 % (ref 4–15)
NEUTROPHILS # BLD AUTO: 4.8 K/UL (ref 1.8–7.7)
NEUTROPHILS NFR BLD: 67.8 % (ref 38–73)
NONHDLC SERPL-MCNC: 77 MG/DL
NRBC BLD-RTO: 0 /100 WBC
PLATELET # BLD AUTO: 146 K/UL (ref 150–450)
PMV BLD AUTO: 10.6 FL (ref 9.2–12.9)
POTASSIUM SERPL-SCNC: 4.6 MMOL/L (ref 3.5–5.1)
PROT SERPL-MCNC: 7 G/DL (ref 6–8.4)
RBC # BLD AUTO: 4.14 M/UL (ref 4.6–6.2)
SODIUM SERPL-SCNC: 139 MMOL/L (ref 136–145)
TRIGL SERPL-MCNC: 53 MG/DL (ref 30–150)
TSH SERPL DL<=0.005 MIU/L-ACNC: 2.6 UIU/ML (ref 0.4–4)
URATE SERPL-MCNC: 4.8 MG/DL (ref 3.4–7)
VIT B12 SERPL-MCNC: >2000 PG/ML (ref 210–950)
WBC # BLD AUTO: 7.03 K/UL (ref 3.9–12.7)

## 2024-09-17 PROCEDURE — 82550 ASSAY OF CK (CPK): CPT | Performed by: INTERNAL MEDICINE

## 2024-09-17 PROCEDURE — 99214 OFFICE O/P EST MOD 30 MIN: CPT | Mod: S$GLB,,, | Performed by: INTERNAL MEDICINE

## 2024-09-17 PROCEDURE — 84550 ASSAY OF BLOOD/URIC ACID: CPT | Performed by: INTERNAL MEDICINE

## 2024-09-17 PROCEDURE — 83036 HEMOGLOBIN GLYCOSYLATED A1C: CPT | Performed by: INTERNAL MEDICINE

## 2024-09-17 PROCEDURE — 85025 COMPLETE CBC W/AUTO DIFF WBC: CPT | Performed by: INTERNAL MEDICINE

## 2024-09-17 PROCEDURE — G0008 ADMIN INFLUENZA VIRUS VAC: HCPCS | Mod: S$GLB,,, | Performed by: INTERNAL MEDICINE

## 2024-09-17 PROCEDURE — 80061 LIPID PANEL: CPT | Performed by: INTERNAL MEDICINE

## 2024-09-17 PROCEDURE — 84443 ASSAY THYROID STIM HORMONE: CPT | Performed by: INTERNAL MEDICINE

## 2024-09-17 PROCEDURE — 90653 IIV ADJUVANT VACCINE IM: CPT | Mod: S$GLB,,, | Performed by: INTERNAL MEDICINE

## 2024-09-17 PROCEDURE — 36415 COLL VENOUS BLD VENIPUNCTURE: CPT | Performed by: INTERNAL MEDICINE

## 2024-09-17 PROCEDURE — 82607 VITAMIN B-12: CPT | Performed by: INTERNAL MEDICINE

## 2024-09-17 PROCEDURE — 80053 COMPREHEN METABOLIC PANEL: CPT | Performed by: INTERNAL MEDICINE

## 2024-09-17 NOTE — PROGRESS NOTES
Subjective     Patient ID: Av Dorantes is a 78 y.o. male.    Chief Complaint: Annual Exam (Flu shot)    Hypertension  This is a chronic problem. The current episode started more than 1 year ago. The problem is controlled.     He has lost 10 pounds in the past 6 months.      Review of Systems   Constitutional: Negative.    Respiratory: Negative.     Cardiovascular: Negative.           Objective     Physical Exam  Constitutional:       Appearance: He is well-developed.   HENT:      Head: Normocephalic and atraumatic.   Eyes:      Pupils: Pupils are equal, round, and reactive to light.   Cardiovascular:      Rate and Rhythm: Normal rate and regular rhythm.      Heart sounds: Murmur heard.      Crescendo decrescendo systolic murmur is present with a grade of 2/6.      Comments: @RUSB  Pulmonary:      Effort: Pulmonary effort is normal.      Breath sounds: Examination of the right-lower field reveals rales. Rales present.      Comments: Crackles in the right lower lobe which clear with cough  Neurological:      Mental Status: He is alert.          Assessment and Plan     1. High cholesterol  Overview:  LDL: 220    Orders:  -     influenza (adjuvanted) (Fluad) 45 mcg/0.5 mL IM vaccine (> or = 64 yo) 0.5 mL    2. Idiopathic chronic gout of multiple sites without tophus  Overview:  Right foot      3. Essential hypertension    4. Thrombocytopenia, unspecified        PLAN:  Per orders and D/C instructions.  Continue diet and/or meds for hyperglycemia, gout, minimal thrombocytopenia, HTN, and high cholesterol, which are stable.  Check routine labs.  Flu shot today.    Between 30 and 39 min of total time for evaluation and management services were spent on the patient today.  The medical problems and treatment options were discussed, and all questions were answered.         Follow up in about 6 months (around 3/17/2025).

## 2024-09-18 ENCOUNTER — PATIENT MESSAGE (OUTPATIENT)
Dept: INTERNAL MEDICINE | Facility: CLINIC | Age: 79
End: 2024-09-18
Payer: MEDICARE

## 2024-11-15 ENCOUNTER — HOSPITAL ENCOUNTER (OUTPATIENT)
Dept: CARDIOLOGY | Facility: OTHER | Age: 79
Discharge: HOME | End: 2024-11-15
Attending: INTERNAL MEDICINE
Payer: MEDICARE

## 2024-11-15 VITALS
BODY MASS INDEX: 29.42 KG/M2 | SYSTOLIC BLOOD PRESSURE: 106 MMHG | WEIGHT: 222 LBS | HEIGHT: 73 IN | DIASTOLIC BLOOD PRESSURE: 58 MMHG

## 2024-11-15 DIAGNOSIS — I35.0 NONRHEUMATIC AORTIC VALVE STENOSIS: ICD-10-CM

## 2024-11-15 DIAGNOSIS — I50.32 CHRONIC DIASTOLIC HEART FAILURE: ICD-10-CM

## 2024-11-15 LAB
AORTIC ROOT ANNULUS: 3.22 CM
ASCENDING AORTA: 2.51 CM
AV INDEX (PROSTH): 0.29
AV MEAN GRADIENT: 16.8 MMHG
AV PEAK GRADIENT: 29.2 MMHG
AV REGURGITATION PRESSURE HALF TIME: 352.03 MS
AV VALVE AREA BY VELOCITY RATIO: 0.9 CM²
AV VALVE AREA: 0.9 CM²
AV VELOCITY RATIO: 0.3
BSA FOR ECHO PROCEDURE: 2.28 M2
CV ECHO LV RWT: 0.38 CM
DOP CALC AO PEAK VEL: 2.7 M/S
DOP CALC AO VTI: 61.8 CM
DOP CALC LVOT AREA: 3.1 CM2
DOP CALC LVOT DIAMETER: 2 CM
DOP CALC LVOT PEAK VEL: 0.8 M/S
DOP CALC LVOT STROKE VOLUME: 56.2 CM3
DOP CALCLVOT PEAK VEL VTI: 17.9 CM
E WAVE DECELERATION TIME: 127.83 MSEC
E/A RATIO: 0.64
E/E' RATIO: 8.13 M/S
ECHO LV POSTERIOR WALL: 1 CM (ref 0.6–1.1)
FRACTIONAL SHORTENING: 36.5 % (ref 28–44)
INTERVENTRICULAR SEPTUM: 1 CM (ref 0.6–1.1)
IVC DIAMETER: 1.96 CM
LA MAJOR: 5.98 CM
LA MINOR: 5.43 CM
LA WIDTH: 4.2 CM
LAAS-AP2: 23 CM2
LAAS-AP4: 24 CM2
LEFT ATRIUM AREA SYSTOLIC (APICAL 2 CHAMBER): 22.88 CM2
LEFT ATRIUM AREA SYSTOLIC (APICAL 4 CHAMBER): 24.16 CM2
LEFT ATRIUM SIZE: 3.3 CM
LEFT ATRIUM VOLUME INDEX MOD: 33.5 ML/M2
LEFT ATRIUM VOLUME INDEX: 29.8 ML/M2
LEFT ATRIUM VOLUME MOD: 75.42 ML
LEFT ATRIUM VOLUME: 67.05 CM3
LEFT INTERNAL DIMENSION IN SYSTOLE: 3.3 CM (ref 2.1–4)
LEFT VENTRICLE DIASTOLIC VOLUME INDEX: 56.84 ML/M2
LEFT VENTRICLE DIASTOLIC VOLUME: 127.9 ML
LEFT VENTRICLE END SYSTOLIC VOLUME APICAL 2 CHAMBER: 72.66 ML
LEFT VENTRICLE END SYSTOLIC VOLUME APICAL 4 CHAMBER: 68.74 ML
LEFT VENTRICLE MASS INDEX: 86.3 G/M2
LEFT VENTRICLE SYSTOLIC VOLUME INDEX: 19.5 ML/M2
LEFT VENTRICLE SYSTOLIC VOLUME: 43.9 ML
LEFT VENTRICULAR INTERNAL DIMENSION IN DIASTOLE: 5.2 CM (ref 3.5–6)
LEFT VENTRICULAR MASS: 194.2 G
LV LATERAL E/E' RATIO: 8.13 M/S
LV SEPTAL E/E' RATIO: 8.13 M/S
LVED V (TEICH): 127.9 ML
LVES V (TEICH): 43.9 ML
LVOT MG: 1.52 MMHG
LVOT MV: 0.59 CM/S
MV PEAK A VEL: 1.01 M/S
MV PEAK E VEL: 0.65 M/S
MV STENOSIS PRESSURE HALF TIME: 37.07 MS
MV VALVE AREA P 1/2 METHOD: 5.93 CM2
PISA AR MAX VEL: 3.17 M/S
PV MV: 0.71 M/S
PV PEAK GRADIENT: 4 MMHG
PV PEAK VELOCITY: 1.04 M/S
RA MAJOR: 4.32 CM
RA PRESSURE ESTIMATED: 3 MMHG
RA WIDTH: 3 CM
RIGHT VENTRICULAR END-DIASTOLIC DIMENSION: 1.76 CM
SINUS: 2.9 CM
STJ: 2.68 CM
TDI LATERAL: 0.08 M/S
TDI SEPTAL: 0.08 M/S
TDI: 0.08 M/S
TRICUSPID ANNULAR PLANE SYSTOLIC EXCURSION: 1.8 CM
Z-SCORE OF LEFT VENTRICULAR DIMENSION IN END DIASTOLE: -4.49
Z-SCORE OF LEFT VENTRICULAR DIMENSION IN END SYSTOLE: -3.18

## 2024-11-15 PROCEDURE — 93306 TTE W/DOPPLER COMPLETE: CPT

## 2024-11-15 PROCEDURE — 93306 TTE W/DOPPLER COMPLETE: CPT | Mod: 26,,, | Performed by: INTERNAL MEDICINE

## 2024-11-18 ENCOUNTER — OFFICE VISIT (OUTPATIENT)
Dept: CARDIOLOGY | Facility: CLINIC | Age: 79
End: 2024-11-18
Payer: MEDICARE

## 2024-11-18 DIAGNOSIS — E78.00 HYPERCHOLESTEROLEMIA: ICD-10-CM

## 2024-11-18 DIAGNOSIS — I50.32 CHRONIC DIASTOLIC HEART FAILURE: ICD-10-CM

## 2024-11-18 DIAGNOSIS — I35.0 NONRHEUMATIC AORTIC VALVE STENOSIS: Primary | ICD-10-CM

## 2024-11-18 DIAGNOSIS — I10 PRIMARY HYPERTENSION: ICD-10-CM

## 2024-11-18 PROCEDURE — 99214 OFFICE O/P EST MOD 30 MIN: CPT | Mod: S$PBB,,, | Performed by: INTERNAL MEDICINE

## 2024-11-18 PROCEDURE — 99999 PR PBB SHADOW E&M-EST. PATIENT-LVL III: CPT | Mod: PBBFAC,,, | Performed by: INTERNAL MEDICINE

## 2024-11-18 PROCEDURE — 99213 OFFICE O/P EST LOW 20 MIN: CPT | Mod: PBBFAC | Performed by: INTERNAL MEDICINE

## 2024-11-18 NOTE — PROGRESS NOTES
[facility]    Chief Complaint  Chief Complaint   Patient presents with    Valvular Heart Disease       HPI:    He seems to be doing well.  No complaints.  Continues to go to the gym 6 days per week.  No problems with exertional dyspnea or chest discomfort.  Has been helping to care for his wife for the past 3 weeks who is recovering from an elective hip replacement.    Medications  Current Outpatient Medications   Medication Sig Dispense Refill    celecoxib (CELEBREX) 200 MG capsule TAKE 1 CAPSULE (200 MG TOTAL) BY MOUTH DAILY AS NEEDED FOR PAIN. TAKE WITH FOOD 30 capsule 3    cholecalciferol, vitamin D3, (VITAMIN D3) 25 mcg (1,000 unit) capsule Take 1,000 Units by mouth once daily.      colchicine (MITIGARE) 0.6 mg Cap Take 1 capsule (0.6 mg total) by mouth once daily. 30 capsule 5    cyanocobalamin 500 MCG tablet Take 500 mcg by mouth once daily.      febuxostat (ULORIC) 80 mg Tab TAKE 1 TABLET DAILY 90 tablet 3    irbesartan (AVAPRO) 300 MG tablet TAKE 1 TABLET BY MOUTH EVERY DAY 90 tablet 1    pantoprazole (PROTONIX) 40 MG tablet TAKE 1 TABLET BY MOUTH EVERY DAY AS NEEDED 30 tablet 1    simvastatin (ZOCOR) 80 MG tablet Take 1 tablet (80 mg total) by mouth every evening. 90 tablet 3    indomethacin (INDOCIN) 25 MG capsule TAKE 1 CAPSULE BY MOUTH TWICE A DAY AS NEEDED (Patient not taking: Reported on 11/18/2024) 14 capsule 0     No current facility-administered medications for this visit.        History  Past Medical History:   Diagnosis Date    Abnormal stress echo 10/12/2015    9/15/2015: Stress Echo: 6:00 min. No CP. ECG negative but frequent VPCs. Echo negative.     Aortic valve stenosis     Family history of premature CAD 01/22/2013    Gout, unspecified 01/22/2013    High cholesterol 01/22/2013    Hypertension, benign 01/22/2013    1995: Diagnosed.     Normal cardiac stress test 01/22/2013    Ureteral stone 01/22/2013     Past Surgical History:   Procedure Laterality Date    ADENOIDECTOMY       TONSILLECTOMY       Social History     Socioeconomic History    Marital status:     Number of children: 2   Occupational History    Occupation: [employer]   Tobacco Use    Smoking status: Former     Current packs/day: 0.00     Average packs/day: 2.0 packs/day for 30.0 years (60.0 ttl pk-yrs)     Types: Cigarettes     Start date: 1962     Quit date: 1992     Years since quittin.9    Smokeless tobacco: Never   Substance and Sexual Activity    Alcohol use: Yes     Alcohol/week: 15.0 standard drinks of alcohol     Types: 15 Shots of liquor per week    Drug use: No    Sexual activity: Not Currently   Social History Narrative    Walks 30 min 3x/wks. Weights 2-3/wk.    Goes to [place] every July.    2019 - retired.     Social Drivers of Health     Financial Resource Strain: Low Risk  (2024)    Overall Financial Resource Strain (CARDIA)     Difficulty of Paying Living Expenses: Not very hard   Food Insecurity: No Food Insecurity (2024)    Hunger Vital Sign     Worried About Running Out of Food in the Last Year: Never true     Ran Out of Food in the Last Year: Never true   Transportation Needs: No Transportation Needs (2024)    PRAPARE - Transportation     Lack of Transportation (Medical): No     Lack of Transportation (Non-Medical): No   Physical Activity: Sufficiently Active (2024)    Exercise Vital Sign     Days of Exercise per Week: 6 days     Minutes of Exercise per Session: 40 min   Stress: No Stress Concern Present (2024)    [employer] [place] [employer] - Occupational Stress Questionnaire     Feeling of Stress : Not at all   Housing Stability: Low Risk  (2024)    Housing Stability Vital Sign     Unable to Pay for Housing in the Last Year: No     Number of Places Lived in the Last Year: 1     Unstable Housing in the Last Year: No     Family History   Problem Relation Name Age of Onset    Cancer Mother      Heart disease Father  51        MI    Diabetes  Paternal Grandfather          Allergies  Review of patient's allergies indicates:  No Known Allergies    Review of Systems   Review of Systems   Constitutional: Negative for malaise/fatigue, weight gain and weight loss.   Eyes:  Negative for visual disturbance.   Cardiovascular:  Negative for chest pain, claudication, cyanosis, dyspnea on exertion, irregular heartbeat, leg swelling, near-syncope, orthopnea, palpitations, paroxysmal nocturnal dyspnea and syncope.   Respiratory:  Negative for cough, hemoptysis, shortness of breath, sleep disturbances due to breathing and wheezing.    Hematologic/Lymphatic: Negative for bleeding problem. Does not bruise/bleed easily.   Skin:  Negative for poor wound healing.   Musculoskeletal:  Negative for muscle cramps and myalgias.   Gastrointestinal:  Negative for abdominal pain, anorexia, diarrhea, heartburn, hematemesis, hematochezia, melena, nausea and vomiting.   Genitourinary:  Negative for hematuria and nocturia.   Neurological:  Negative for excessive daytime sleepiness, dizziness, focal weakness, light-headedness and weakness.       Physical Exam  Vitals:    11/18/24 1317   BP: (P) 126/64   Pulse: (P) 72     Wt Readings from Last 1 Encounters:   11/18/24 (P) 101.2 kg (223 lb 1.6 oz)     Physical Exam  Vitals and nursing note reviewed.   Constitutional:       General: He is not in acute distress.     Appearance: He is not toxic-appearing or diaphoretic.   HENT:      Head: Normocephalic and atraumatic.      Mouth/Throat:      Lips: Pink.      Mouth: Mucous membranes are moist.   Eyes:      General: No scleral icterus.     Conjunctiva/sclera: Conjunctivae normal.   Neck:      Thyroid: No thyromegaly.      Vascular: No carotid bruit, hepatojugular reflux or JVD.      Trachea: Trachea normal.   Cardiovascular:      Rate and Rhythm: Normal rate and regular rhythm. No extrasystoles are present.     Chest Wall: PMI is not displaced.      Pulses:           Carotid pulses are 2+ on  the right side and 2+ on the left side.       Radial pulses are 2+ on the right side and 2+ on the left side.        Dorsalis pedis pulses are 2+ on the right side and 2+ on the left side.      Heart sounds: S1 normal and S2 normal. Murmur heard.      Harsh midsystolic murmur is present with a grade of 2/6 at the upper right sternal border radiating to the neck.      No friction rub. No S3 or S4 sounds.   Pulmonary:      Effort: Pulmonary effort is normal. No tachypnea, bradypnea, accessory muscle usage or respiratory distress.      Breath sounds: Normal breath sounds and air entry. No decreased breath sounds, wheezing, rhonchi or rales.   Abdominal:      General: Bowel sounds are normal. There is no distension or abdominal bruit.      Palpations: Abdomen is soft. There is no hepatomegaly, splenomegaly or pulsatile mass.      Tenderness: There is no abdominal tenderness.   Musculoskeletal:         General: No tenderness or deformity.      Right lower leg: No edema.      Left lower leg: No edema.   Skin:     General: Skin is warm and dry.      Capillary Refill: Capillary refill takes less than 2 seconds.      Coloration: Skin is not cyanotic or pale.      Findings: Petechiae present.      Nails: There is no clubbing.   Neurological:      General: No focal deficit present.      Mental Status: He is alert and oriented to person, place, and time.   Psychiatric:         Attention and Perception: Attention normal.         Mood and Affect: Mood normal.         Speech: Speech normal.         Behavior: Behavior normal. Behavior is cooperative.         Labs  Hospital Outpatient Visit on 11/15/2024   Component Date Value Ref Range Status    BSA 11/15/2024 2.28  m2 Final    LVOT stroke volume 11/15/2024 56.2  cm3 Final    LVIDd 11/15/2024 5.2  3.5 - 6.0 cm Final    LV Systolic Volume 11/15/2024 43.90  mL Final    LV Systolic Volume Index 11/15/2024 19.5  mL/m2 Final    LVIDs 11/15/2024 3.3  2.1 - 4.0 cm Final    LV ESV A2C  11/15/2024 72.66  mL Final    LV Diastolic Volume 11/15/2024 127.90  mL Final    LV ESV A4C 11/15/2024 68.74  mL Final    LV Diastolic Volume Index 11/15/2024 56.84  mL/m2 Final    Left Ventricular End Systolic Volu* 11/15/2024 43.90  mL Final    Left Ventricular End Diastolic Vol* 11/15/2024 127.90  mL Final    IVS 11/15/2024 1.0  0.6 - 1.1 cm Final    LVOT diameter 11/15/2024 2.0  cm Final    LVOT area 11/15/2024 3.1  cm2 Final    FS 11/15/2024 36.5  28 - 44 % Final    Left Ventricle Relative Wall Thick* 11/15/2024 0.38  cm Final    PW 11/15/2024 1.0  0.6 - 1.1 cm Final    LV mass 11/15/2024 194.2  g Final    LV Mass Index 11/15/2024 86.3  g/m2 Final    MV Peak E Elmo 11/15/2024 0.65  m/s Final    TDI LATERAL 11/15/2024 0.08  m/s Final    TDI SEPTAL 11/15/2024 0.08  m/s Final    E/E' ratio 11/15/2024 8.13  m/s Final    MV Peak A Elmo 11/15/2024 1.01  m/s Final    E/A ratio 11/15/2024 0.64   Final    E wave deceleration time 11/15/2024 127.83  msec Final    LV SEPTAL E/E' RATIO 11/15/2024 8.13  m/s Final    LV LATERAL E/E' RATIO 11/15/2024 8.13  m/s Final    LVOT peak elmo 11/15/2024 0.8  m/s Final    Left Ventricular Outflow Tract Sophia* 11/15/2024 0.59  cm/s Final    Left Ventricular Outflow Tract Sophia* 11/15/2024 1.52  mmHg Final    LA size 11/15/2024 3.30  cm Final    Left Atrium Minor Axis 11/15/2024 5.43  cm Final    Left Atrium Major Axis 11/15/2024 5.98  cm Final    LA Vol (MOD) 11/15/2024 75.42  mL Final    HUNTER (MOD) 11/15/2024 33.5  mL/m2 Final    RA Major Axis 11/15/2024 4.32  cm Final    AV regurgitation pressure 1/2 time 11/15/2024 352.812485310167558  ms Final    AR Max Elmo 11/15/2024 3.17  m/s Final    AV mean gradient 11/15/2024 16.8  mmHg Final    AV peak gradient 11/15/2024 29.2  mmHg Final    Ao peak elmo 11/15/2024 2.7  m/s Final    Ao VTI 11/15/2024 61.8  cm Final    LVOT peak VTI 11/15/2024 17.9  cm Final    AV valve area 11/15/2024 0.9  cm² Final    AV Velocity Ratio 11/15/2024 0.30   Final    AV  index (prosthetic) 11/15/2024 0.29   Final    DICK by Velocity Ratio 11/15/2024 0.9  cm² Final    MV stenosis pressure 1/2 time 11/15/2024 37.07  ms Final    MV valve area p 1/2 method 11/15/2024 5.93  cm2 Final    PV PEAK VELOCITY 11/15/2024 1.04  m/s Final    PV peak gradient 11/15/2024 4  mmHg Final    Pulmonary Valve Mean Velocity 11/15/2024 0.71  m/s Final    Ao root annulus 11/15/2024 3.22  cm Final    STJ 11/15/2024 2.68  cm Final    Ascending aorta 11/15/2024 2.51  cm Final    IVC diameter 11/15/2024 1.96  cm Final    Mean e' 11/15/2024 0.08  m/s Final    ZLVIDS 11/15/2024 -3.18   Final    ZLVIDD 11/15/2024 -4.49   Final    LA area A4C 11/15/2024 24.16  cm2 Final    LA area A2C 11/15/2024 22.88  cm2 Final    RVDD 11/15/2024 1.76  cm Final    HUNTER 11/15/2024 29.8  mL/m2 Final    LA Vol 11/15/2024 67.05  cm3 Final    TAPSE 11/15/2024 1.80  cm Final    LA WIDTH 11/15/2024 4.2  cm Final    Left Atrium Area-systolic Apical T* 11/15/2024 23.0  cm2 Final    Left Atrium Area-systolic Four Jess* 11/15/2024 24.0  cm2 Final    RA Width 11/15/2024 3.0  cm Final    Sinus 11/15/2024 2.9  cm Final    Est. RA pres 11/15/2024 3  mmHg Final   Lab Visit on 09/17/2024   Component Date Value Ref Range Status    Sodium 09/17/2024 139  136 - 145 mmol/L Final    Potassium 09/17/2024 4.6  3.5 - 5.1 mmol/L Final    Chloride 09/17/2024 108  95 - 110 mmol/L Final    CO2 09/17/2024 22 (L)  23 - 29 mmol/L Final    Glucose 09/17/2024 97  70 - 110 mg/dL Final    BUN 09/17/2024 41 (H)  8 - 23 mg/dL Final    Creatinine 09/17/2024 1.3  0.5 - 1.4 mg/dL Final    Calcium 09/17/2024 9.9  8.7 - 10.5 mg/dL Final    Total Protein 09/17/2024 7.0  6.0 - 8.4 g/dL Final    Albumin 09/17/2024 3.9  3.5 - 5.2 g/dL Final    Total Bilirubin 09/17/2024 0.8  0.1 - 1.0 mg/dL Final    Comment: For infants and newborns, interpretation of results should be based  on gestational age, weight and in agreement with clinical  observations.    Premature Infant recommended  reference ranges:  Up to 24 hours.............<8.0 mg/dL  Up to 48 hours............<12.0 mg/dL  3-5 days..................<15.0 mg/dL  6-29 days.................<15.0 mg/dL      Alkaline Phosphatase 09/17/2024 57  55 - 135 U/L Final    AST 09/17/2024 22  10 - 40 U/L Final    ALT 09/17/2024 24  10 - 44 U/L Final    eGFR 09/17/2024 56 (A)  >60 mL/min/1.73 m^2 Final    Anion Gap 09/17/2024 9  8 - 16 mmol/L Final    Cholesterol 09/17/2024 156  120 - 199 mg/dL Final    Comment: The National Cholesterol Education Program (NCEP) has set the  following guidelines (reference ranges) for Cholesterol:  Optimal.....................<200 mg/dL  Borderline High.............200-239 mg/dL  High........................> or = 240 mg/dL      Triglycerides 09/17/2024 53  30 - 150 mg/dL Final    Comment: The National Cholesterol Education Program (NCEP) has set the  following guidelines (reference values) for triglycerides:  Normal......................<150 mg/dL  Borderline High.............150-199 mg/dL  High........................200-499 mg/dL      HDL 09/17/2024 79 (H)  40 - 75 mg/dL Final    Comment: The National Cholesterol Education Program (NCEP) has set the  following guidelines (reference values) for HDL Cholesterol:  Low...............<40 mg/dL  Optimal...........>60 mg/dL      LDL Cholesterol 09/17/2024 66.4  63.0 - 159.0 mg/dL Final    Comment: The National Cholesterol Education Program (NCEP) has set the  following guidelines (reference values) for LDL Cholesterol:  Optimal.......................<130 mg/dL  Borderline High...............130-159 mg/dL  High..........................160-189 mg/dL  Very High.....................>190 mg/dL      HDL/Cholesterol Ratio 09/17/2024 50.6 (H)  20.0 - 50.0 % Final    Total Cholesterol/HDL Ratio 09/17/2024 2.0  2.0 - 5.0 Final    Non-HDL Cholesterol 09/17/2024 77  mg/dL Final    Comment: Risk category and Non-HDL cholesterol goals:  Coronary heart disease (CHD)or equivalent (10-year  risk of CHD >20%):  Non-HDL cholesterol goal     <130 mg/dL  Two or more CHD risk factors and 10-year risk of CHD <= 20%:  Non-HDL cholesterol goal     <160 mg/dL  0 to 1 CHD risk factor:  Non-HDL cholesterol goal     <190 mg/dL      WBC 09/17/2024 7.03  3.90 - 12.70 K/uL Final    RBC 09/17/2024 4.14 (L)  4.60 - 6.20 M/uL Final    Hemoglobin 09/17/2024 13.7 (L)  14.0 - 18.0 g/dL Final    Hematocrit 09/17/2024 41.3  40.0 - 54.0 % Final    MCV 09/17/2024 100 (H)  82 - 98 fL Final    MCH 09/17/2024 33.1 (H)  27.0 - 31.0 pg Final    MCHC 09/17/2024 33.2  32.0 - 36.0 g/dL Final    RDW 09/17/2024 11.9  11.5 - 14.5 % Final    Platelets 09/17/2024 146 (L)  150 - 450 K/uL Final    MPV 09/17/2024 10.6  9.2 - 12.9 fL Final    Immature Granulocytes 09/17/2024 0.3  0.0 - 0.5 % Final    Gran # (ANC) 09/17/2024 4.8  1.8 - 7.7 K/uL Final    Immature Grans (Abs) 09/17/2024 0.02  0.00 - 0.04 K/uL Final    Comment: Mild elevation in immature granulocytes is non specific and   can be seen in a variety of conditions including stress response,   acute inflammation, trauma and pregnancy. Correlation with other   laboratory and clinical findings is essential.      Lymph # 09/17/2024 1.3  1.0 - 4.8 K/uL Final    Mono # 09/17/2024 0.8  0.3 - 1.0 K/uL Final    Eos # 09/17/2024 0.1  0.0 - 0.5 K/uL Final    Baso # 09/17/2024 0.04  0.00 - 0.20 K/uL Final    nRBC 09/17/2024 0  0 /100 WBC Final    Gran % 09/17/2024 67.8  38.0 - 73.0 % Final    Lymph % 09/17/2024 17.9 (L)  18.0 - 48.0 % Final    Mono % 09/17/2024 11.4  4.0 - 15.0 % Final    Eosinophil % 09/17/2024 2.0  0.0 - 8.0 % Final    Basophil % 09/17/2024 0.6  0.0 - 1.9 % Final    Differential Method 09/17/2024 Automated   Final    Hemoglobin A1C 09/17/2024 5.5  4.0 - 5.6 % Final    Comment: ADA Screening Guidelines:  5.7-6.4%  Consistent with prediabetes  >or=6.5%  Consistent with diabetes    High levels of fetal hemoglobin interfere with the HbA1C  assay. Heterozygous hemoglobin variants  (HbS, HgC, etc)do  not significantly interfere with this assay.   However, presence of multiple variants may affect accuracy.      Estimated Avg Glucose 09/17/2024 111  68 - 131 mg/dL Final    TSH 09/17/2024 2.599  0.400 - 4.000 uIU/mL Final    Vitamin B-12 09/17/2024 >2000 (H)  210 - 950 pg/mL Final    CPK 09/17/2024 163  20 - 200 U/L Final    Uric Acid 09/17/2024 4.8  3.4 - 7.0 mg/dL Final       Imaging  Echo    Result Date: 11/15/2024    Left Ventricle: The left ventricle is normal in size. Normal wall thickness. There is normal systolic function with a visually estimated ejection fraction of 55 - 60%. Grade I diastolic dysfunction.   Right Ventricle: Normal right ventricular cavity size. Wall thickness is normal. Systolic function is normal.   Left Atrium: Left atrium is dilated.   Aortic Valve: There is moderate stenosis.  Aortic valve peak velocity is 2.7 m/s. Mean gradient is 16.8 mmHg. The dimensionless index is 0.29.       Assessment  1. Nonrheumatic aortic valve stenosis  Moderate and asymptomatic    2. Chronic diastolic heart failure  Compensate    3. Primary hypertension  Controlled    4. Hypercholesterolemia  Controlled      Plan and Discussion    No change to present management.    The ASCVD Risk score (Debra DK, et al., 2019) failed to calculate for the following reasons:    The 2019 ASCVD risk score is only valid for ages 40 to 79     Follow Up  Follow up in about 6 months (around 5/18/2025).      Alberto Montes MD

## 2024-11-21 ENCOUNTER — DOCUMENTATION ONLY (OUTPATIENT)
Dept: INTERNAL MEDICINE | Facility: CLINIC | Age: 79
End: 2024-11-21
Payer: MEDICARE

## 2024-11-21 DIAGNOSIS — E78.9 DISORDER OF LIPID METABOLISM: ICD-10-CM

## 2024-11-21 DIAGNOSIS — Z78.9 KNOWN MEDICAL PROBLEMS: ICD-10-CM

## 2024-11-21 DIAGNOSIS — I10 ESSENTIAL HYPERTENSION: Primary | ICD-10-CM

## 2024-11-21 DIAGNOSIS — I10 ESSENTIAL HYPERTENSION: ICD-10-CM

## 2024-11-21 RX ORDER — IRBESARTAN 300 MG/1
300 TABLET ORAL
Qty: 90 TABLET | Refills: 1 | Status: SHIPPED | OUTPATIENT
Start: 2024-11-21

## 2024-11-26 NOTE — PROGRESS NOTES
The patient's electronic chart was reviewed during this month. The patient's medical, functional, and psychosocial needs were assessed. Need for Home health care, PT, OT, , psychiatric care, and hospice was assessed. All preventative care measures were reviewed and updated. All medications were reviewed and reconciled. Potential drug interactions and medication adherence was reviewed. Prescriptions were renewed as appropriate. Education was provided to the patient and/or caregiver as needed, and all questions were answered. Over 20 minutes were spent providing these non-face-to-face services during this calendar month.     Refilled:    Approved Prescriptions     irbesartan (AVAPRO) 300 MG tablet         Sig: TAKE 1 TABLET BY MOUTH EVERY DAY    Disp: 90 tablet    Refills: 1    Start: 11/21/2024    Class: Normal    Authorized by: KAVITHA Larry MD    For: Essential hypertension    To pharmacy: .        To be filled at: [facility] #[id number] - [place] - [address]

## 2025-01-16 DIAGNOSIS — G47.00 INSOMNIA, UNSPECIFIED TYPE: Primary | ICD-10-CM

## 2025-01-16 RX ORDER — ZOLPIDEM TARTRATE 5 MG/1
TABLET ORAL
Qty: 2 TABLET | Refills: 0 | Status: SHIPPED | OUTPATIENT
Start: 2025-01-16

## 2025-01-30 ENCOUNTER — TELEPHONE (OUTPATIENT)
Dept: CARDIOLOGY | Facility: CLINIC | Age: 80
End: 2025-01-30
Payer: MEDICARE

## 2025-01-30 NOTE — TELEPHONE ENCOUNTER
----- Message from Sue sent at 1/30/2025  2:58 PM CST -----  Contact: Patient  Type:  Patient Call          Who Called:Patient         Does the patient know what this is regarding?:Requesting a call back pt said hat he recently returned from a trip out of the country and since returning he experienced serious jet lag he would like to have a appointment for next week any day except 2/5 to have a checkup ; please advise            Would the patient rather a call back or a response via [facility]? Call           Best Call Back Number: [phone]        Additional Information:Pt previously called but he would like to leave a different call back number

## 2025-02-04 ENCOUNTER — OFFICE VISIT (OUTPATIENT)
Dept: CARDIOLOGY | Facility: CLINIC | Age: 80
End: 2025-02-04
Payer: MEDICARE

## 2025-02-04 DIAGNOSIS — I10 PRIMARY HYPERTENSION: ICD-10-CM

## 2025-02-04 DIAGNOSIS — I35.0 NONRHEUMATIC AORTIC VALVE STENOSIS: Primary | ICD-10-CM

## 2025-02-04 DIAGNOSIS — I50.32 CHRONIC DIASTOLIC HEART FAILURE: ICD-10-CM

## 2025-02-04 PROCEDURE — 99999 PR PBB SHADOW E&M-EST. PATIENT-LVL III: CPT | Mod: PBBFAC,,, | Performed by: INTERNAL MEDICINE

## 2025-02-04 PROCEDURE — 99213 OFFICE O/P EST LOW 20 MIN: CPT | Mod: PBBFAC | Performed by: INTERNAL MEDICINE

## 2025-02-04 PROCEDURE — 99214 OFFICE O/P EST MOD 30 MIN: CPT | Mod: S$PBB,,, | Performed by: INTERNAL MEDICINE

## 2025-02-04 NOTE — PROGRESS NOTES
[facility]    Chief Complaint  Chief Complaint   Patient presents with    Valvular Heart Disease       HPI:    Recently went to [place].  [place] fatigued walking through the airport.  Had to stop to rest.  But no chest pain or dyspnea.  No syncope or presyncope.  For the 1st few days when he got 2 pairs, was fatigued again.  But ultimately recovered.  After returning to [place], only took him a couple of days to get back to normal.  Has been in the gym without exertional dyspnea or chest discomfort.  No paroxysmal nocturnal dyspnea or orthopnea.  Probably overdid it with sodium a little bit while traveling.  But back to normal as far as diet.    Medications  Current Outpatient Medications   Medication Sig Dispense Refill    celecoxib (CELEBREX) 200 MG capsule TAKE 1 CAPSULE (200 MG TOTAL) BY MOUTH DAILY AS NEEDED FOR PAIN. TAKE WITH FOOD 30 capsule 3    cholecalciferol, vitamin D3, (VITAMIN D3) 25 mcg (1,000 unit) capsule Take 1,000 Units by mouth once daily.      colchicine (MITIGARE) 0.6 mg Cap Take 1 capsule (0.6 mg total) by mouth once daily. 30 capsule 5    cyanocobalamin 500 MCG tablet Take 500 mcg by mouth once daily.      febuxostat (ULORIC) 80 mg Tab TAKE 1 TABLET DAILY 90 tablet 3    irbesartan (AVAPRO) 300 MG tablet TAKE 1 TABLET BY MOUTH EVERY DAY 90 tablet 1    pantoprazole (PROTONIX) 40 MG tablet TAKE 1 TABLET BY MOUTH EVERY DAY AS NEEDED 30 tablet 1    simvastatin (ZOCOR) 80 MG tablet Take 1 tablet (80 mg total) by mouth every evening. 90 tablet 3    zolpidem (AMBIEN) 5 MG Tab Take 1 Tablet Nightly as needed for overnight flight 2 tablet 0    indomethacin (INDOCIN) 25 MG capsule TAKE 1 CAPSULE BY MOUTH TWICE A DAY AS NEEDED (Patient not taking: Reported on 9/17/2024) 14 capsule 0     No current facility-administered medications for this visit.        History  Past Medical History:   Diagnosis Date    Abnormal stress echo 10/12/2015    9/15/2015: Stress Echo: 6:00 min. No CP. ECG  negative but frequent VPCs. Echo negative.     Aortic valve stenosis     Family history of premature CAD 2013    Gout, unspecified 2013    High cholesterol 2013    Hypertension, benign 2013    1995: Diagnosed.     Normal cardiac stress test 2013    Ureteral stone 2013     Past Surgical History:   Procedure Laterality Date    ADENOIDECTOMY      TONSILLECTOMY       Social History     Socioeconomic History    Marital status:     Number of children: 2   Occupational History    Occupation: [employer]   Tobacco Use    Smoking status: Former     Current packs/day: 0.00     Average packs/day: 2.0 packs/day for 30.0 years (60.0 ttl pk-yrs)     Types: Cigarettes     Start date: 1962     Quit date: 1992     Years since quittin.1    Smokeless tobacco: Never   Substance and Sexual Activity    Alcohol use: Yes     Alcohol/week: 15.0 standard drinks of alcohol     Types: 15 Shots of liquor per week    Drug use: No    Sexual activity: Not Currently   Social History Narrative    Walks 30 min 3x/wks. Weights 2-3/wk.    Goes to [place] every July.    2019 - retired.     Social Drivers of Health     Financial Resource Strain: Low Risk  (2024)    Overall Financial Resource Strain (CARDIA)     Difficulty of Paying Living Expenses: Not very hard   Food Insecurity: No Food Insecurity (2024)    Hunger Vital Sign     Worried About Running Out of Food in the Last Year: Never true     Ran Out of Food in the Last Year: Never true   Transportation Needs: No Transportation Needs (2024)    PRAPARE - Transportation     Lack of Transportation (Medical): No     Lack of Transportation (Non-Medical): No   Physical Activity: Sufficiently Active (2024)    Exercise Vital Sign     Days of Exercise per Week: 6 days     Minutes of Exercise per Session: 40 min   Stress: No Stress Concern Present (2024)    [employer] - Occupational Stress Questionnaire      Feeling of Stress : Not at all   Housing Stability: Low Risk  (1/8/2024)    Housing Stability Vital Sign     Unable to Pay for Housing in the Last Year: No     Number of Places Lived in the Last Year: 1     Unstable Housing in the Last Year: No     Family History   Problem Relation Name Age of Onset    Cancer Mother      Heart disease Father  51        MI    Diabetes Paternal Grandfather          Allergies  Review of patient's allergies indicates:  No Known Allergies    Review of Systems   Review of Systems   Constitutional: Negative for malaise/fatigue, weight gain and weight loss.   Eyes:  Negative for visual disturbance.   Cardiovascular:  Negative for chest pain, claudication, cyanosis, dyspnea on exertion, irregular heartbeat, leg swelling, near-syncope, orthopnea, palpitations, paroxysmal nocturnal dyspnea and syncope.   Respiratory:  Negative for cough, hemoptysis, shortness of breath, sleep disturbances due to breathing and wheezing.    Hematologic/Lymphatic: Negative for bleeding problem. Does not bruise/bleed easily.   Skin:  Negative for poor wound healing.   Musculoskeletal:  Negative for muscle cramps and myalgias.   Gastrointestinal:  Negative for abdominal pain, anorexia, diarrhea, heartburn, hematemesis, hematochezia, melena, nausea and vomiting.   Genitourinary:  Negative for hematuria and nocturia.   Neurological:  Negative for excessive daytime sleepiness, dizziness, focal weakness, light-headedness and weakness.       Physical Exam  Vitals:    02/04/25 1450   BP: (!) (P) 110/58   Pulse: (P) 85     Wt Readings from Last 1 Encounters:   02/04/25 (P) 101 kg (222 lb 9.6 oz)     Physical Exam  Vitals and nursing note reviewed.   Constitutional:       General: He is not in acute distress.     Appearance: He is not toxic-appearing or diaphoretic.   HENT:      Head: Normocephalic and atraumatic.      Mouth/Throat:      Lips: Pink.      Mouth: Mucous membranes are moist.   Eyes:      General: No  scleral icterus.     Conjunctiva/sclera: Conjunctivae normal.   Neck:      Thyroid: No thyromegaly.      Vascular: No carotid bruit, hepatojugular reflux or JVD.      Trachea: Trachea normal.   Cardiovascular:      Rate and Rhythm: Normal rate and regular rhythm. No extrasystoles are present.     Chest Wall: PMI is not displaced.      Pulses:           Carotid pulses are 2+ on the right side and 2+ on the left side.       Radial pulses are 2+ on the right side and 2+ on the left side.        Dorsalis pedis pulses are 2+ on the right side and 2+ on the left side.      Heart sounds: S1 normal and S2 normal. Murmur heard.      Harsh midsystolic murmur is present with a grade of 2/6 at the upper right sternal border radiating to the neck.      No friction rub. No S3 or S4 sounds.   Pulmonary:      Effort: Pulmonary effort is normal. No tachypnea, bradypnea, accessory muscle usage or respiratory distress.      Breath sounds: Normal breath sounds and air entry. No decreased breath sounds, wheezing, rhonchi or rales.   Abdominal:      General: Bowel sounds are normal. There is no distension or abdominal bruit.      Palpations: Abdomen is soft. There is no hepatomegaly, splenomegaly or pulsatile mass.      Tenderness: There is no abdominal tenderness.   Musculoskeletal:         General: No tenderness or deformity.      Right lower leg: No edema.      Left lower leg: No edema.   Skin:     General: Skin is warm and dry.      Capillary Refill: Capillary refill takes less than 2 seconds.      Coloration: Skin is not cyanotic or pale.      Findings: Petechiae present.      Nails: There is no clubbing.   Neurological:      General: No focal deficit present.      Mental Status: He is alert and oriented to person, place, and time.   Psychiatric:         Attention and Perception: Attention normal.         Mood and Affect: Mood normal.         Speech: Speech normal.         Behavior: Behavior normal. Behavior is cooperative.          Labs  Hospital Outpatient Visit on 11/15/2024   Component Date Value Ref Range Status    BSA 11/15/2024 2.28  m2 Final    LVOT stroke volume 11/15/2024 56.2  cm3 Final    LVIDd 11/15/2024 5.2  3.5 - 6.0 cm Final    LV Systolic Volume 11/15/2024 43.90  mL Final    LV Systolic Volume Index 11/15/2024 19.5  mL/m2 Final    LVIDs 11/15/2024 3.3  2.1 - 4.0 cm Final    LV ESV A2C 11/15/2024 72.66  mL Final    LV Diastolic Volume 11/15/2024 127.90  mL Final    LV ESV A4C 11/15/2024 68.74  mL Final    LV Diastolic Volume Index 11/15/2024 56.84  mL/m2 Final    Left Ventricular End Systolic Volu* 11/15/2024 43.90  mL Final    Left Ventricular End Diastolic Vol* 11/15/2024 127.90  mL Final    IVS 11/15/2024 1.0  0.6 - 1.1 cm Final    LVOT diameter 11/15/2024 2.0  cm Final    LVOT area 11/15/2024 3.1  cm2 Final    FS 11/15/2024 36.5  28 - 44 % Final    Left Ventricle Relative Wall Thick* 11/15/2024 0.38  cm Final    PW 11/15/2024 1.0  0.6 - 1.1 cm Final    LV mass 11/15/2024 194.2  g Final    LV Mass Index 11/15/2024 86.3  g/m2 Final    MV Peak E Elmo 11/15/2024 0.65  m/s Final    TDI LATERAL 11/15/2024 0.08  m/s Final    TDI SEPTAL 11/15/2024 0.08  m/s Final    E/E' ratio 11/15/2024 8.13  m/s Final    MV Peak A Elmo 11/15/2024 1.01  m/s Final    E/A ratio 11/15/2024 0.64   Final    E wave deceleration time 11/15/2024 127.83  msec Final    LV SEPTAL E/E' RATIO 11/15/2024 8.13  m/s Final    LV LATERAL E/E' RATIO 11/15/2024 8.13  m/s Final    LVOT peak elmo 11/15/2024 0.8  m/s Final    Left Ventricular Outflow Tract Sophia* 11/15/2024 0.59  cm/s Final    Left Ventricular Outflow Tract Sophia* 11/15/2024 1.52  mmHg Final    LA size 11/15/2024 3.30  cm Final    Left Atrium Minor Axis 11/15/2024 5.43  cm Final    Left Atrium Major Axis 11/15/2024 5.98  cm Final    LA Vol (MOD) 11/15/2024 75.42  mL Final    HUNTER (MOD) 11/15/2024 33.5  mL/m2 Final    RA Major Axis 11/15/2024 4.32  cm Final    AV regurgitation pressure 1/2 time  11/15/2024 352.349468198246516  ms Final    AR Max Elmo 11/15/2024 3.17  m/s Final    AV mean gradient 11/15/2024 16.8  mmHg Final    AV peak gradient 11/15/2024 29.2  mmHg Final    Ao peak elmo 11/15/2024 2.7  m/s Final    Ao VTI 11/15/2024 61.8  cm Final    LVOT peak VTI 11/15/2024 17.9  cm Final    AV valve area 11/15/2024 0.9  cm² Final    AV Velocity Ratio 11/15/2024 0.30   Final    AV index (prosthetic) 11/15/2024 0.29   Final    DICK by Velocity Ratio 11/15/2024 0.9  cm² Final    MV stenosis pressure 1/2 time 11/15/2024 37.07  ms Final    MV valve area p 1/2 method 11/15/2024 5.93  cm2 Final    PV PEAK VELOCITY 11/15/2024 1.04  m/s Final    PV peak gradient 11/15/2024 4  mmHg Final    Pulmonary Valve Mean Velocity 11/15/2024 0.71  m/s Final    Ao root annulus 11/15/2024 3.22  cm Final    STJ 11/15/2024 2.68  cm Final    Ascending aorta 11/15/2024 2.51  cm Final    IVC diameter 11/15/2024 1.96  cm Final    Mean e' 11/15/2024 0.08  m/s Final    ZLVIDS 11/15/2024 -3.18   Final    ZLVIDD 11/15/2024 -4.49   Final    LA area A4C 11/15/2024 24.16  cm2 Final    LA area A2C 11/15/2024 22.88  cm2 Final    RVDD 11/15/2024 1.76  cm Final    HUNTER 11/15/2024 29.8  mL/m2 Final    LA Vol 11/15/2024 67.05  cm3 Final    TAPSE 11/15/2024 1.80  cm Final    LA WIDTH 11/15/2024 4.2  cm Final    Left Atrium Area-systolic Apical T* 11/15/2024 23.0  cm2 Final    Left Atrium Area-systolic Four Jess* 11/15/2024 24.0  cm2 Final    RA Width 11/15/2024 3.0  cm Final    Sinus 11/15/2024 2.9  cm Final    Est. RA pres 11/15/2024 3  mmHg Final   Lab Visit on 09/17/2024   Component Date Value Ref Range Status    Sodium 09/17/2024 139  136 - 145 mmol/L Final    Potassium 09/17/2024 4.6  3.5 - 5.1 mmol/L Final    Chloride 09/17/2024 108  95 - 110 mmol/L Final    CO2 09/17/2024 22 (L)  23 - 29 mmol/L Final    Glucose 09/17/2024 97  70 - 110 mg/dL Final    BUN 09/17/2024 41 (H)  8 - 23 mg/dL Final    Creatinine 09/17/2024 1.3  0.5 - 1.4 mg/dL Final     Calcium 09/17/2024 9.9  8.7 - 10.5 mg/dL Final    Total Protein 09/17/2024 7.0  6.0 - 8.4 g/dL Final    Albumin 09/17/2024 3.9  3.5 - 5.2 g/dL Final    Total Bilirubin 09/17/2024 0.8  0.1 - 1.0 mg/dL Final    Comment: For infants and newborns, interpretation of results should be based  on gestational age, weight and in agreement with clinical  observations.    Premature Infant recommended reference ranges:  Up to 24 hours.............<8.0 mg/dL  Up to 48 hours............<12.0 mg/dL  3-5 days..................<15.0 mg/dL  6-29 days.................<15.0 mg/dL      Alkaline Phosphatase 09/17/2024 57  55 - 135 U/L Final    AST 09/17/2024 22  10 - 40 U/L Final    ALT 09/17/2024 24  10 - 44 U/L Final    eGFR 09/17/2024 56 (A)  >60 mL/min/1.73 m^2 Final    Anion Gap 09/17/2024 9  8 - 16 mmol/L Final    Cholesterol 09/17/2024 156  120 - 199 mg/dL Final    Comment: The National Cholesterol Education Program (NCEP) has set the  following guidelines (reference ranges) for Cholesterol:  Optimal.....................<200 mg/dL  Borderline High.............200-239 mg/dL  High........................> or = 240 mg/dL      Triglycerides 09/17/2024 53  30 - 150 mg/dL Final    Comment: The National Cholesterol Education Program (NCEP) has set the  following guidelines (reference values) for triglycerides:  Normal......................<150 mg/dL  Borderline High.............150-199 mg/dL  High........................200-499 mg/dL      HDL 09/17/2024 79 (H)  40 - 75 mg/dL Final    Comment: The National Cholesterol Education Program (NCEP) has set the  following guidelines (reference values) for HDL Cholesterol:  Low...............<40 mg/dL  Optimal...........>60 mg/dL      LDL Cholesterol 09/17/2024 66.4  63.0 - 159.0 mg/dL Final    Comment: The National Cholesterol Education Program (NCEP) has set the  following guidelines (reference values) for LDL Cholesterol:  Optimal.......................<130 mg/dL  Borderline  High...............130-159 mg/dL  High..........................160-189 mg/dL  Very High.....................>190 mg/dL      HDL/Cholesterol Ratio 09/17/2024 50.6 (H)  20.0 - 50.0 % Final    Total Cholesterol/HDL Ratio 09/17/2024 2.0  2.0 - 5.0 Final    Non-HDL Cholesterol 09/17/2024 77  mg/dL Final    Comment: Risk category and Non-HDL cholesterol goals:  Coronary heart disease (CHD)or equivalent (10-year risk of CHD >20%):  Non-HDL cholesterol goal     <130 mg/dL  Two or more CHD risk factors and 10-year risk of CHD <= 20%:  Non-HDL cholesterol goal     <160 mg/dL  0 to 1 CHD risk factor:  Non-HDL cholesterol goal     <190 mg/dL      WBC 09/17/2024 7.03  3.90 - 12.70 K/uL Final    RBC 09/17/2024 4.14 (L)  4.60 - 6.20 M/uL Final    Hemoglobin 09/17/2024 13.7 (L)  14.0 - 18.0 g/dL Final    Hematocrit 09/17/2024 41.3  40.0 - 54.0 % Final    MCV 09/17/2024 100 (H)  82 - 98 fL Final    MCH 09/17/2024 33.1 (H)  27.0 - 31.0 pg Final    MCHC 09/17/2024 33.2  32.0 - 36.0 g/dL Final    RDW 09/17/2024 11.9  11.5 - 14.5 % Final    Platelets 09/17/2024 146 (L)  150 - 450 K/uL Final    MPV 09/17/2024 10.6  9.2 - 12.9 fL Final    Immature Granulocytes 09/17/2024 0.3  0.0 - 0.5 % Final    Gran # (ANC) 09/17/2024 4.8  1.8 - 7.7 K/uL Final    Immature Grans (Abs) 09/17/2024 0.02  0.00 - 0.04 K/uL Final    Comment: Mild elevation in immature granulocytes is non specific and   can be seen in a variety of conditions including stress response,   acute inflammation, trauma and pregnancy. Correlation with other   laboratory and clinical findings is essential.      Lymph # 09/17/2024 1.3  1.0 - 4.8 K/uL Final    Mono # 09/17/2024 0.8  0.3 - 1.0 K/uL Final    Eos # 09/17/2024 0.1  0.0 - 0.5 K/uL Final    Baso # 09/17/2024 0.04  0.00 - 0.20 K/uL Final    nRBC 09/17/2024 0  0 /100 WBC Final    Gran % 09/17/2024 67.8  38.0 - 73.0 % Final    Lymph % 09/17/2024 17.9 (L)  18.0 - 48.0 % Final    Mono % 09/17/2024 11.4  4.0 - 15.0 % Final     Eosinophil % 09/17/2024 2.0  0.0 - 8.0 % Final    Basophil % 09/17/2024 0.6  0.0 - 1.9 % Final    Differential Method 09/17/2024 Automated   Final    Hemoglobin A1C 09/17/2024 5.5  4.0 - 5.6 % Final    Comment: ADA Screening Guidelines:  5.7-6.4%  Consistent with prediabetes  >or=6.5%  Consistent with diabetes    High levels of fetal hemoglobin interfere with the HbA1C  assay. Heterozygous hemoglobin variants (HbS, HgC, etc)do  not significantly interfere with this assay.   However, presence of multiple variants may affect accuracy.      Estimated Avg Glucose 09/17/2024 111  68 - 131 mg/dL Final    TSH 09/17/2024 2.599  0.400 - 4.000 uIU/mL Final    Vitamin B-12 09/17/2024 >2000 (H)  210 - 950 pg/mL Final    CPK 09/17/2024 163  20 - 200 U/L Final    Uric Acid 09/17/2024 4.8  3.4 - 7.0 mg/dL Final       Imaging  No results found.    Assessment  1. Nonrheumatic aortic valve stenosis   Moderate    2. Chronic diastolic heart failure  Compensated    3. Primary hypertension  Controlled      Plan and Discussion    Discussed the rigors of international air travel.  I do not think his symptoms are anything surprising given his age and aortic stenosis.  Reassured.    The ASCVD Risk score (Debra DK, et al., 2019) failed to calculate for the following reasons:    The 2019 ASCVD risk score is only valid for ages 40 to 79     Follow Up  As scheduled      Alberto Montes MD

## 2025-03-20 ENCOUNTER — OFFICE VISIT (OUTPATIENT)
Dept: INTERNAL MEDICINE | Facility: CLINIC | Age: 80
End: 2025-03-20
Attending: INTERNAL MEDICINE
Payer: MEDICARE

## 2025-03-20 VITALS
HEIGHT: 73 IN | WEIGHT: 219 LBS | HEART RATE: 90 BPM | BODY MASS INDEX: 29.03 KG/M2 | SYSTOLIC BLOOD PRESSURE: 100 MMHG | OXYGEN SATURATION: 93 % | DIASTOLIC BLOOD PRESSURE: 58 MMHG

## 2025-03-20 DIAGNOSIS — M54.50 ACUTE LOW BACK PAIN WITHOUT SCIATICA, UNSPECIFIED BACK PAIN LATERALITY: ICD-10-CM

## 2025-03-20 DIAGNOSIS — I35.0 NONRHEUMATIC AORTIC VALVE STENOSIS: ICD-10-CM

## 2025-03-20 DIAGNOSIS — F51.01 PRIMARY INSOMNIA: ICD-10-CM

## 2025-03-20 DIAGNOSIS — E78.00 HIGH CHOLESTEROL: Primary | ICD-10-CM

## 2025-03-20 DIAGNOSIS — Z13.31 SCREENING FOR DEPRESSION: ICD-10-CM

## 2025-03-20 DIAGNOSIS — D69.6 THROMBOCYTOPENIA, UNSPECIFIED: ICD-10-CM

## 2025-03-20 DIAGNOSIS — Z13.39 SCREENING FOR ALCOHOLISM: ICD-10-CM

## 2025-03-20 DIAGNOSIS — I10 ESSENTIAL HYPERTENSION: ICD-10-CM

## 2025-03-20 PROBLEM — E66.811 OBESITY (BMI 30.0-34.9): Status: RESOLVED | Noted: 2018-09-04 | Resolved: 2025-03-20

## 2025-03-20 RX ORDER — MELATONIN 5 MG
1 CAPSULE ORAL NIGHTLY PRN
COMMUNITY

## 2025-03-20 NOTE — PROGRESS NOTES
Subjective     Patient ID: Av Dorantes is a 79 y.o. male.    Chief Complaint: Follow-up (Low back pain has an apt with Ortho coming up ) and Hyperlipidemia    He has been having some low back pain recently.  He has some difficulty falling asleep at night.        Hypertension  This is a chronic problem. The current episode started more than 1 year ago. The problem is controlled.           Follow-up  This is a chronic problem. The current episode started more than 1 year ago. The problem has been unchanged.   Hyperlipidemia      Review of Systems   Constitutional: Negative.    Respiratory: Negative.     Cardiovascular: Negative.    Musculoskeletal:  Positive for back pain.   Psychiatric/Behavioral:  Positive for sleep disturbance.           Objective     Physical Exam  Constitutional:       Appearance: He is well-developed.   HENT:      Head: Normocephalic and atraumatic.   Eyes:      Pupils: Pupils are equal, round, and reactive to light.   Cardiovascular:      Rate and Rhythm: Normal rate and regular rhythm.      Heart sounds: Murmur heard.      Crescendo decrescendo systolic murmur is present with a grade of 2/6.      Comments: @RUSB  Pulmonary:      Effort: Pulmonary effort is normal.   Neurological:      Mental Status: He is alert.            Assessment and Plan     1. High cholesterol  Overview:  LDL: 220      2. Essential hypertension    3. Thrombocytopenia, unspecified    4. Primary insomnia    5. Screening for alcoholism    6. Screening for depression    7. Nonrheumatic aortic valve stenosis  Overview:  6/22 - Mild  1/23 - Moderate  increase LA      8. Acute low back pain without sciatica, unspecified back pain laterality        PLAN:  Per orders and D/C instructions.  Continue diet and/or meds for mild thrombocytopenia, HTN, and high cholesterol, which are stable.  He will keep his appointment with Dr. Aguilar for his low back pain.  Follow-up with cardiology for aortic valve stenosis.  Try melatonin 5  mg nightly as needed for insomnia.    Screening: The patient was screened for depression with the PHQ2 questionnaire and possible health consequences were discussed with the patient, who understands (15 minutes spent).       The patient was screened for the misuse of alcohol, by asking the number of drinks per average week, and if pt has had more than 4 drinks (more than 3 for women and elderly) in 1 day within the past year. The health and legal consequences of misuse were discussed (15 minutes spent).        Follow up in about 6 months (around 9/20/2025).

## 2025-03-28 ENCOUNTER — DOCUMENTATION ONLY (OUTPATIENT)
Dept: INTERNAL MEDICINE | Facility: CLINIC | Age: 80
End: 2025-03-28
Payer: MEDICARE

## 2025-03-28 DIAGNOSIS — Z78.9 KNOWN MEDICAL PROBLEMS: ICD-10-CM

## 2025-03-28 DIAGNOSIS — I10 ESSENTIAL HYPERTENSION: Primary | ICD-10-CM

## 2025-03-28 DIAGNOSIS — J11.1 FLU: Primary | ICD-10-CM

## 2025-03-28 DIAGNOSIS — E78.00 HIGH CHOLESTEROL: ICD-10-CM

## 2025-03-28 NOTE — PROGRESS NOTES
Advanced primary care management (APCM) billing requirements have been met for this month.   Written consent was obtained and scanned in the electronic medical record.    My office is prepared and capable of delivering all the following APCM service elements to this qualified Medicare beneficiary:     The patient is aware that only one practitioner can furnish and be paid for the service during a calendar month; that they have the right to stop the services at any time; and that cost sharing may apply.   Provide 24/7 access to care team/practitioner for urgent needs.  Provide continuity of care with the PCP, whom the patient can schedule successive routine appointments.  Deliver care in alternative ways to traditional office visits to best meet the patient's needs, such as Telemedecine visits.  There is overall comprehensive care management, systematic needs assessment (medical and psychosocial), and system-based approaches to ensure receipt of preventive services.    Perform medication management, reconciliation, and oversight of self-management.  Development, implementation, revision, and maintenance of an electronic patient-centered comprehensive care plan.  The care plan is available, can be routinely accessed, and updated by care team, and a copy of care plan can be given to patient/caregiver.  Ensure timely follow-up communication with the patient and/or caregiver after an emergency department visit, discharge from hospital, skilled nursing facility, or other health care facility, within 7 calendar days of discharge.  Provide coordination of care transitions between and among health care facilities.  Ensure timely exchange of electronic health information with other practitioners and providers to support continuity of care.   Provide ongoing communication and coordination with other practitioners and other health care facilities, and document communication regarding the patient's psychosocial needs,  functional deficits, goals, preferences, and desired outcomes, including cultural and linguistic factors, in the patient's medical record.  Provide enhanced opportunities for the patient or caregiver to communicate with the care team regarding the patient's care using non-face-to-face consultation methods, such as secure messaging, patient portal, or other patient-initiated digital communications that require a clinical decision.  Analyze patient population data to identify gaps in care and offer interventions.  Risk stratify the practice population based on defined diagnoses, claims, or other electronic data to identify and target services to patients.  Be assessed through performance measurement of primary care quality, total cost of care, and meaningful use of Certified EHR Technology.

## 2025-03-29 RX ORDER — OSELTAMIVIR PHOSPHATE 75 MG/1
75 CAPSULE ORAL 2 TIMES DAILY
Qty: 10 CAPSULE | Refills: 0 | Status: SHIPPED | OUTPATIENT
Start: 2025-03-29 | End: 2025-04-03

## 2025-04-06 RX ORDER — CELECOXIB 200 MG/1
200 CAPSULE ORAL DAILY PRN
Qty: 30 CAPSULE | Refills: 3 | Status: SHIPPED | OUTPATIENT
Start: 2025-04-06

## 2025-04-23 ENCOUNTER — TELEPHONE (OUTPATIENT)
Dept: CARDIOLOGY | Facility: CLINIC | Age: 80
End: 2025-04-23
Payer: MEDICARE

## 2025-04-23 NOTE — TELEPHONE ENCOUNTER
"----- Message from Nghia sent at 4/23/2025  4:17 PM CDT -----  Regarding: call back  Consult/Advisory:  Name Of Caller: Self Contact Preference?:[phone] What is the nature of the call?: Calling to speak w/ someone in regards to getting a handicap tag requesting call back   Additional Notes:"Thank you for all that you do for our patients"  "

## 2025-05-07 ENCOUNTER — TELEPHONE (OUTPATIENT)
Dept: CARDIOLOGY | Facility: CLINIC | Age: 80
End: 2025-05-07
Payer: MEDICARE

## 2025-05-07 NOTE — TELEPHONE ENCOUNTER
----- Message from Sue sent at 5/7/2025 10:13 AM CDT -----  Contact: patient  Type:  Patient CallWho Called: Patient Does the patient know what this is regarding?: Requesting a call back about having a sooner appointment if possible ;I did reschedule him to 5/13 ;pt said he haven't been feeling well ; please advise Would the patient rather a call back or a response via [facility]?call Best Call Back Number: [phone] Additional Information:

## 2025-05-12 ENCOUNTER — OFFICE VISIT (OUTPATIENT)
Dept: CARDIOLOGY | Facility: CLINIC | Age: 80
End: 2025-05-12
Payer: MEDICARE

## 2025-05-12 DIAGNOSIS — I50.32 CHRONIC DIASTOLIC HEART FAILURE: ICD-10-CM

## 2025-05-12 DIAGNOSIS — I10 PRIMARY HYPERTENSION: ICD-10-CM

## 2025-05-12 DIAGNOSIS — M10.9 GOUT, UNSPECIFIED CAUSE, UNSPECIFIED CHRONICITY, UNSPECIFIED SITE: ICD-10-CM

## 2025-05-12 DIAGNOSIS — I35.0 NONRHEUMATIC AORTIC VALVE STENOSIS: Primary | ICD-10-CM

## 2025-05-12 PROCEDURE — 99213 OFFICE O/P EST LOW 20 MIN: CPT | Mod: PBBFAC | Performed by: INTERNAL MEDICINE

## 2025-05-12 PROCEDURE — 99999 PR PBB SHADOW E&M-EST. PATIENT-LVL III: CPT | Mod: PBBFAC,,, | Performed by: INTERNAL MEDICINE

## 2025-05-12 RX ORDER — FEBUXOSTAT 80 MG/1
1 TABLET, FILM COATED ORAL
Qty: 90 TABLET | Refills: 3 | Status: SHIPPED | OUTPATIENT
Start: 2025-05-12

## 2025-05-12 NOTE — PROGRESS NOTES
[facility]    Chief Complaint  Chief Complaint   Patient presents with    Follow-up       HPI:    Here because he feels more fatigued.  Similar to what he felt when he was traveling in pairs.  His wife is here who thinks he may have been minimizing his symptoms at his last office visit after that trip.  May feel lightheaded when he is exercising.  No syncope.  No paroxysmal nocturnal dyspnea or orthopnea.    Medications  Current Medications[1]     History  Past Medical History:   Diagnosis Date    Abnormal stress echo 10/12/2015    9/15/2015: Stress Echo: 6:00 min. No CP. ECG negative but frequent VPCs. Echo negative.     Aortic valve stenosis     Family history of premature CAD 01/22/2013    Gout, unspecified 01/22/2013    High cholesterol 01/22/2013    Hypertension, benign 01/22/2013    1995: Diagnosed.     Normal cardiac stress test 01/22/2013    Ureteral stone 01/22/2013     Past Surgical History:   Procedure Laterality Date    ADENOIDECTOMY      TONSILLECTOMY       Social History[2]  Family History   Problem Relation Name Age of Onset    Cancer Mother      Heart disease Father  51        MI    Diabetes Paternal Grandfather          Allergies  Review of patient's allergies indicates:  No Known Allergies    Review of Systems   Review of Systems   Constitutional: Positive for malaise/fatigue. Negative for weight gain and weight loss.   Eyes:  Negative for visual disturbance.   Cardiovascular:  Positive for dyspnea on exertion. Negative for chest pain, claudication, cyanosis, irregular heartbeat, leg swelling, near-syncope, orthopnea, palpitations, paroxysmal nocturnal dyspnea and syncope.   Respiratory:  Negative for cough, hemoptysis, shortness of breath, sleep disturbances due to breathing and wheezing.    Hematologic/Lymphatic: Negative for bleeding problem. Does not bruise/bleed easily.   Skin:  Negative for poor wound healing.   Musculoskeletal:  Negative for muscle cramps and myalgias.    Gastrointestinal:  Negative for abdominal pain, anorexia, diarrhea, heartburn, hematemesis, hematochezia, melena, nausea and vomiting.   Genitourinary:  Negative for hematuria and nocturia.   Neurological:  Positive for light-headedness. Negative for excessive daytime sleepiness, dizziness, focal weakness and weakness.       Physical Exam  Vitals:    05/12/25 1306   BP: (!) (P) 124/56   Pulse: (P) 63     Wt Readings from Last 1 Encounters:   05/12/25 (P) 97.6 kg (215 lb 3.2 oz)     Physical Exam  Vitals and nursing note reviewed.   Constitutional:       General: He is not in acute distress.     Appearance: He is not toxic-appearing or diaphoretic.   HENT:      Head: Normocephalic and atraumatic.      Mouth/Throat:      Lips: Pink.      Mouth: Mucous membranes are moist.   Eyes:      General: No scleral icterus.     Conjunctiva/sclera: Conjunctivae normal.   Neck:      Thyroid: No thyromegaly.      Vascular: No carotid bruit, hepatojugular reflux or JVD.      Trachea: Trachea normal.   Cardiovascular:      Rate and Rhythm: Normal rate and regular rhythm. No extrasystoles are present.     Chest Wall: PMI is not displaced.      Pulses:           Carotid pulses are 2+ on the right side and 2+ on the left side.       Radial pulses are 2+ on the right side and 2+ on the left side.        Dorsalis pedis pulses are 2+ on the right side and 2+ on the left side.      Heart sounds: S1 normal and S2 normal. Murmur heard.      Harsh midsystolic murmur is present with a grade of 2/6 at the upper right sternal border radiating to the neck.      No friction rub. No S3 or S4 sounds.   Pulmonary:      Effort: Pulmonary effort is normal. No tachypnea, bradypnea, accessory muscle usage or respiratory distress.      Breath sounds: Normal breath sounds and air entry. No decreased breath sounds, wheezing, rhonchi or rales.   Abdominal:      General: Bowel sounds are normal. There is no distension or abdominal bruit.      Palpations:  Abdomen is soft. There is no hepatomegaly, splenomegaly or pulsatile mass.      Tenderness: There is no abdominal tenderness.   Musculoskeletal:         General: No tenderness or deformity.      Right lower leg: No edema.      Left lower leg: No edema.   Skin:     General: Skin is warm and dry.      Capillary Refill: Capillary refill takes less than 2 seconds.      Coloration: Skin is not cyanotic or pale.      Findings: Petechiae present.      Nails: There is no clubbing.   Neurological:      General: No focal deficit present.      Mental Status: He is alert and oriented to person, place, and time.   Psychiatric:         Attention and Perception: Attention normal.         Mood and Affect: Mood normal.         Speech: Speech normal.         Behavior: Behavior normal. Behavior is cooperative.         Labs  Hospital Outpatient Visit on 11/15/2024   Component Date Value Ref Range Status    BSA 11/15/2024 2.28  m2 Final    LVOT stroke volume 11/15/2024 56.2  cm3 Final    LVIDd 11/15/2024 5.2  3.5 - 6.0 cm Final    LV Systolic Volume 11/15/2024 43.90  mL Final    LV Systolic Volume Index 11/15/2024 19.5  mL/m2 Final    LVIDs 11/15/2024 3.3  2.1 - 4.0 cm Final    LV ESV A2C 11/15/2024 72.66  mL Final    LV Diastolic Volume 11/15/2024 127.90  mL Final    LV ESV A4C 11/15/2024 68.74  mL Final    LV Diastolic Volume Index 11/15/2024 56.84  mL/m2 Final    Left Ventricular End Systolic Volu* 11/15/2024 43.90  mL Final    Left Ventricular End Diastolic Vol* 11/15/2024 127.90  mL Final    IVS 11/15/2024 1.0  0.6 - 1.1 cm Final    LVOT diameter 11/15/2024 2.0  cm Final    LVOT area 11/15/2024 3.1  cm2 Final    FS 11/15/2024 36.5  28 - 44 % Final    Left Ventricle Relative Wall Thick* 11/15/2024 0.38  cm Final    PW 11/15/2024 1.0  0.6 - 1.1 cm Final    LV mass 11/15/2024 194.2  g Final    LV Mass Index 11/15/2024 86.3  g/m2 Final    MV Peak E Elmo 11/15/2024 0.65  m/s Final    TDI LATERAL 11/15/2024 0.08  m/s Final    TDI SEPTAL  11/15/2024 0.08  m/s Final    E/E' ratio 11/15/2024 8.13  m/s Final    MV Peak A Elmo 11/15/2024 1.01  m/s Final    E/A ratio 11/15/2024 0.64   Final    E wave deceleration time 11/15/2024 127.83  msec Final    LV SEPTAL E/E' RATIO 11/15/2024 8.13  m/s Final    LV LATERAL E/E' RATIO 11/15/2024 8.13  m/s Final    LVOT peak elmo 11/15/2024 0.8  m/s Final    Left Ventricular Outflow Tract Sophia* 11/15/2024 0.59  cm/s Final    Left Ventricular Outflow Tract Sophia* 11/15/2024 1.52  mmHg Final    LA size 11/15/2024 3.30  cm Final    Left Atrium Minor Axis 11/15/2024 5.43  cm Final    Left Atrium Major Axis 11/15/2024 5.98  cm Final    LA Vol (MOD) 11/15/2024 75.42  mL Final    HUNTER (MOD) 11/15/2024 33.5  mL/m2 Final    RA Major Axis 11/15/2024 4.32  cm Final    AV regurgitation pressure 1/2 time 11/15/2024 [id number]  ms Final    AR Max Elmo 11/15/2024 3.17  m/s Final    AV mean gradient 11/15/2024 16.8  mmHg Final    AV peak gradient 11/15/2024 29.2  mmHg Final    Ao peak elmo 11/15/2024 2.7  m/s Final    Ao VTI 11/15/2024 61.8  cm Final    LVOT peak VTI 11/15/2024 17.9  cm Final    AV valve area 11/15/2024 0.9  cm² Final    AV Velocity Ratio 11/15/2024 0.30   Final    AV index (prosthetic) 11/15/2024 0.29   Final    DICK by Velocity Ratio 11/15/2024 0.9  cm² Final    MV stenosis pressure 1/2 time 11/15/2024 37.07  ms Final    MV valve area p 1/2 method 11/15/2024 5.93  cm2 Final    PV PEAK VELOCITY 11/15/2024 1.04  m/s Final    PV peak gradient 11/15/2024 4  mmHg Final    Pulmonary Valve Mean Velocity 11/15/2024 0.71  m/s Final    Ao root annulus 11/15/2024 3.22  cm Final    STJ 11/15/2024 2.68  cm Final    Ascending aorta 11/15/2024 2.51  cm Final    IVC diameter 11/15/2024 1.96  cm Final    Mean e' 11/15/2024 0.08  m/s Final    ZLVIDS 11/15/2024 -3.18   Final    ZLVIDD 11/15/2024 -4.49   Final    LA area A4C 11/15/2024 24.16  cm2 Final    LA area A2C 11/15/2024 22.88  cm2 Final    RVDD 11/15/2024 1.76  cm Final     HUNTER 11/15/2024 29.8  mL/m2 Final    LA Vol 11/15/2024 67.05  cm3 Final    TAPSE 11/15/2024 1.80  cm Final    LA WIDTH 11/15/2024 4.2  cm Final    Left Atrium Area-systolic Apical T* 11/15/2024 23.0  cm2 Final    Left Atrium Area-systolic Four Jess* 11/15/2024 24.0  cm2 Final    RA Width 11/15/2024 3.0  cm Final    Sinus 11/15/2024 2.9  cm Final    Est. RA pres 11/15/2024 3  mmHg Final       Imaging  No results found.    Assessment  1. Nonrheumatic aortic valve stenosis  Seems more symptomatic  - CBC Auto Differential; Future  - Comprehensive Metabolic Panel; Future  - Echo; Future    2. Chronic diastolic heart failure  Compensated    3. Primary hypertension  Controlled      Plan and Discussion    Difficult to say if his symptoms are solely from worsening of his aortic stenosis.  Will get an echocardiogram.  Blood work to assess for other etiologies.    The ASCVD Risk score (Pawling DK, et al., 2019) failed to calculate for the following reasons:    The 2019 ASCVD risk score is only valid for ages 40 to 79     Follow Up  Follow up in about 2 weeks (around 5/26/2025).      Alberto Montes MD         [1]   Current Outpatient Medications   Medication Sig Dispense Refill    celecoxib (CELEBREX) 200 MG capsule TAKE 1 CAPSULE (200 MG TOTAL) BY MOUTH DAILY AS NEEDED FOR PAIN. TAKE WITH FOOD 30 capsule 3    cholecalciferol, vitamin D3, (VITAMIN D3) 25 mcg (1,000 unit) capsule Take 1,000 Units by mouth once daily.      colchicine (MITIGARE) 0.6 mg Cap Take 1 capsule (0.6 mg total) by mouth once daily. 30 capsule 5    febuxostat (ULORIC) 80 mg Tab TAKE 1 TABLET DAILY 90 tablet 3    irbesartan (AVAPRO) 300 MG tablet TAKE 1 TABLET BY MOUTH EVERY DAY 90 tablet 1    melatonin 5 mg Cap Take 1 capsule by mouth nightly as needed (insomnia).      simvastatin (ZOCOR) 80 MG tablet Take 1 tablet (80 mg total) by mouth every evening. 90 tablet 3     No current facility-administered medications for this visit.   [2]   Social  History  Socioeconomic History    Marital status:     Number of children: 2   Occupational History    Occupation: Rising   Tobacco Use    Smoking status: Former     Current packs/day: 0.00     Average packs/day: 2.0 packs/day for 30.0 years (60.0 ttl pk-yrs)     Types: Cigarettes     Start date: 1962     Quit date: 1992     Years since quittin.3    Smokeless tobacco: Never   Substance and Sexual Activity    Alcohol use: Yes     Alcohol/week: 15.0 standard drinks of alcohol     Types: 15 Shots of liquor per week    Drug use: No    Sexual activity: Not Currently   Social History Narrative    Walks 30 min 3x/wks. Weights 2-3/wk.    Goes to [place] every July.     - retired.     - he goes to the gym 5-7 days per week     Social Drivers of Health     Financial Resource Strain: Low Risk  (2024)    Overall Financial Resource Strain (CARDIA)     Difficulty of Paying Living Expenses: Not very hard   Food Insecurity: No Food Insecurity (2024)    Hunger Vital Sign     Worried About Running Out of Food in the Last Year: Never true     Ran Out of Food in the Last Year: Never true   Transportation Needs: No Transportation Needs (2024)    PRAPARE - Transportation     Lack of Transportation (Medical): No     Lack of Transportation (Non-Medical): No   Physical Activity: Sufficiently Active (2024)    Exercise Vital Sign     Days of Exercise per Week: 6 days     Minutes of Exercise per Session: 40 min   Stress: No Stress Concern Present (2024)    [employer] - Occupational Stress Questionnaire     Feeling of Stress : Not at all   Housing Stability: Low Risk  (2024)    Housing Stability Vital Sign     Unable to Pay for Housing in the Last Year: No     Number of Places Lived in the Last Year: 1     Unstable Housing in the Last Year: No

## 2025-05-13 LAB
OHS QRS DURATION: 98 MS
OHS QTC CALCULATION: 434 MS

## 2025-05-16 DIAGNOSIS — I10 ESSENTIAL HYPERTENSION: ICD-10-CM

## 2025-05-16 RX ORDER — IRBESARTAN 300 MG/1
300 TABLET ORAL
Qty: 90 TABLET | Refills: 1 | Status: SHIPPED | OUTPATIENT
Start: 2025-05-16

## 2025-05-19 ENCOUNTER — RESULTS FOLLOW-UP (OUTPATIENT)
Dept: CARDIOLOGY | Facility: OTHER | Age: 80
End: 2025-05-19

## 2025-05-19 ENCOUNTER — HOSPITAL ENCOUNTER (OUTPATIENT)
Dept: CARDIOLOGY | Facility: OTHER | Age: 80
Discharge: HOME | End: 2025-05-19
Attending: INTERNAL MEDICINE
Payer: OTHER GOVERNMENT

## 2025-05-19 VITALS
SYSTOLIC BLOOD PRESSURE: 124 MMHG | WEIGHT: 215 LBS | DIASTOLIC BLOOD PRESSURE: 56 MMHG | BODY MASS INDEX: 28.49 KG/M2 | HEIGHT: 73 IN

## 2025-05-19 DIAGNOSIS — I35.0 NONRHEUMATIC AORTIC VALVE STENOSIS: ICD-10-CM

## 2025-05-19 LAB
AORTIC ROOT ANNULUS: 3.4 CM
AORTIC SIZE INDEX (SOV): 1.3 CM/M2
AORTIC SIZE INDEX: 1.1 CM/M2
ASCENDING AORTA: 2.5 CM
AV INDEX (PROSTH): 0.27
AV MEAN GRADIENT: 15 MMHG
AV PEAK GRADIENT: 29 MMHG
AV REGURGITATION PRESSURE HALF TIME: 453 MS
AV VALVE AREA BY VELOCITY RATIO: 0.7 CM²
AV VALVE AREA: 0.8 CM²
AV VELOCITY RATIO: 0.22
BSA FOR ECHO PROCEDURE: 2.24 M2
CV ECHO LV RWT: 0.34 CM
DOP CALC AO PEAK VEL: 2.7 M/S
DOP CALC AO VTI: 54.2 CM
DOP CALC LVOT AREA: 3.1 CM2
DOP CALC LVOT DIAMETER: 2 CM
DOP CALC LVOT PEAK VEL: 0.6 M/S
DOP CALC LVOT STROKE VOLUME: 45.2 CM3
DOP CALCLVOT PEAK VEL VTI: 14.4 CM
E WAVE DECELERATION TIME: 94 MSEC
E/A RATIO: 0.74
E/E' RATIO: 17 M/S
ECHO LV POSTERIOR WALL: 0.8 CM (ref 0.6–1.1)
FRACTIONAL SHORTENING: 34 % (ref 28–44)
GLOBAL LONGITUIDAL STRAIN: 12.3 %
INTERVENTRICULAR SEPTUM: 0.8 CM (ref 0.6–1.1)
IVC DIAMETER: 1.93 CM
LA MAJOR: 5.5 CM
LA MINOR: 4.8 CM
LA WIDTH: 3.9 CM
LAAS-AP2: 21 CM2
LAAS-AP4: 23 CM2
LEFT ATRIUM AREA SYSTOLIC (APICAL 2 CHAMBER): 20.44 CM2
LEFT ATRIUM AREA SYSTOLIC (APICAL 4 CHAMBER): 23.45 CM2
LEFT ATRIUM SIZE: 4.1 CM
LEFT ATRIUM VOLUME INDEX MOD: 30 ML/M2
LEFT ATRIUM VOLUME INDEX: 31 ML/M2
LEFT ATRIUM VOLUME MOD: 66 ML
LEFT ATRIUM VOLUME: 70 CM3
LEFT INTERNAL DIMENSION IN SYSTOLE: 3.1 CM (ref 2.1–4)
LEFT VENTRICLE DIASTOLIC VOLUME INDEX: 45.95 ML/M2
LEFT VENTRICLE DIASTOLIC VOLUME: 102 ML
LEFT VENTRICLE END SYSTOLIC VOLUME APICAL 2 CHAMBER: 56.14 ML
LEFT VENTRICLE END SYSTOLIC VOLUME APICAL 4 CHAMBER: 76.98 ML
LEFT VENTRICLE MASS INDEX: 55.1 G/M2
LEFT VENTRICLE SYSTOLIC VOLUME INDEX: 17.6 ML/M2
LEFT VENTRICLE SYSTOLIC VOLUME: 39 ML
LEFT VENTRICULAR INTERNAL DIMENSION IN DIASTOLE: 4.7 CM (ref 3.5–6)
LEFT VENTRICULAR MASS: 122.3 G
LV LATERAL E/E' RATIO: 17.2 M/S
LV SEPTAL E/E' RATIO: 17.2 M/S
LVED V (TEICH): 102.41 ML
LVES V (TEICH): 39.02 ML
LVOT MG: 1 MMHG
LVOT MV: 0.48 CM/S
MV PEAK A VEL: 1.16 M/S
MV PEAK E VEL: 0.86 M/S
MV STENOSIS PRESSURE HALF TIME: 27.19 MS
MV VALVE AREA P 1/2 METHOD: 8.09 CM2
OHS CV RV/LV RATIO: 0.47 CM
PISA AR MAX VEL: 3.97 M/S
PISA MRMAX VEL: 1.95 M/S
PISA TR MAX VEL: 2.6 M/S
PV MV: 0.68 M/S
PV PEAK GRADIENT: 4 MMHG
PV PEAK VELOCITY: 0.95 M/S
RA MAJOR: 5.94 CM
RA PRESSURE ESTIMATED: 3 MMHG
RA WIDTH: 3.9 CM
RIGHT VENTRICLE DIASTOLIC BASEL DIMENSION: 2.2 CM
RIGHT VENTRICULAR END-DIASTOLIC DIMENSION: 2.23 CM
RV TB RVSP: 6 MMHG
RV TISSUE DOPPLER FREE WALL SYSTOLIC VELOCITY 1 (APICAL 4 CHAMBER VIEW): 13.61 CM/S
SINUS: 2.8 CM
STJ: 2.7 CM
TDI LATERAL: 0.05 M/S
TDI SEPTAL: 0.05 M/S
TDI: 0.05 M/S
TR MAX PG: 27 MMHG
TRICUSPID ANNULAR PLANE SYSTOLIC EXCURSION: 1.7 CM
TV REST PULMONARY ARTERY PRESSURE: 30 MMHG
Z-SCORE OF LEFT VENTRICULAR DIMENSION IN END DIASTOLE: -5.01
Z-SCORE OF LEFT VENTRICULAR DIMENSION IN END SYSTOLE: -3.3

## 2025-05-19 PROCEDURE — 93356 MYOCRD STRAIN IMG SPCKL TRCK: CPT | Mod: ,,, | Performed by: INTERNAL MEDICINE

## 2025-05-19 PROCEDURE — 93306 TTE W/DOPPLER COMPLETE: CPT

## 2025-05-19 PROCEDURE — 93306 TTE W/DOPPLER COMPLETE: CPT | Mod: 26,,, | Performed by: INTERNAL MEDICINE

## 2025-05-22 ENCOUNTER — OFFICE VISIT (OUTPATIENT)
Dept: CARDIOLOGY | Facility: CLINIC | Age: 80
End: 2025-05-22
Payer: OTHER GOVERNMENT

## 2025-05-22 ENCOUNTER — LAB VISIT (OUTPATIENT)
Dept: LAB | Facility: OTHER | Age: 80
End: 2025-05-22
Attending: INTERNAL MEDICINE
Payer: OTHER GOVERNMENT

## 2025-05-22 VITALS
OXYGEN SATURATION: 95 % | WEIGHT: 215.81 LBS | BODY MASS INDEX: 28.47 KG/M2 | SYSTOLIC BLOOD PRESSURE: 102 MMHG | DIASTOLIC BLOOD PRESSURE: 66 MMHG | HEART RATE: 74 BPM

## 2025-05-22 DIAGNOSIS — I50.32 CHRONIC DIASTOLIC HEART FAILURE: ICD-10-CM

## 2025-05-22 DIAGNOSIS — I35.0 NONRHEUMATIC AORTIC VALVE STENOSIS: Primary | ICD-10-CM

## 2025-05-22 DIAGNOSIS — I35.0 NONRHEUMATIC AORTIC VALVE STENOSIS: ICD-10-CM

## 2025-05-22 LAB
INR PPP: 1 (ref 0.8–1.2)
PROTHROMBIN TIME: 10.9 SECONDS (ref 9–12.5)

## 2025-05-22 PROCEDURE — 99215 OFFICE O/P EST HI 40 MIN: CPT | Mod: PBBFAC | Performed by: INTERNAL MEDICINE

## 2025-05-22 PROCEDURE — 85610 PROTHROMBIN TIME: CPT

## 2025-05-22 PROCEDURE — 99999 PR PBB SHADOW E&M-EST. PATIENT-LVL V: CPT | Mod: PBBFAC,,, | Performed by: INTERNAL MEDICINE

## 2025-05-22 PROCEDURE — 36415 COLL VENOUS BLD VENIPUNCTURE: CPT

## 2025-05-22 RX ORDER — SODIUM CHLORIDE 9 MG/ML
INJECTION, SOLUTION INTRAVENOUS CONTINUOUS
OUTPATIENT
Start: 2025-05-22

## 2025-05-22 RX ORDER — SODIUM CHLORIDE 0.9 % (FLUSH) 0.9 %
10 SYRINGE (ML) INJECTION
Status: SHIPPED | OUTPATIENT
Start: 2025-05-22

## 2025-05-22 NOTE — PROGRESS NOTES
[facility]    Chief Complaint  Chief Complaint   Patient presents with    Follow-up       HPI:    Symptoms continue to worsen and become more limiting.  No syncope or presyncope.  Fatigue and short of breath walking less than half a block.  No paroxysmal nocturnal dyspnea or orthopnea.    Medications  Current Medications[1]     History  Past Medical History:   Diagnosis Date    Abnormal stress echo 10/12/2015    9/15/2015: Stress Echo: 6:00 min. No CP. ECG negative but frequent VPCs. Echo negative.     Aortic valve stenosis     Family history of premature CAD 01/22/2013    Gout, unspecified 01/22/2013    High cholesterol 01/22/2013    Hypertension, benign 01/22/2013    1995: Diagnosed.     Normal cardiac stress test 01/22/2013    Ureteral stone 01/22/2013     Past Surgical History:   Procedure Laterality Date    ADENOIDECTOMY      TONSILLECTOMY       Social History[2]  Family History   Problem Relation Name Age of Onset    Cancer Mother      Heart disease Father  51        MI    Diabetes Paternal Grandfather          Allergies  Review of patient's allergies indicates:  No Known Allergies    Review of Systems   Review of Systems   Constitutional: Positive for malaise/fatigue. Negative for weight gain and weight loss.   Eyes:  Negative for visual disturbance.   Cardiovascular:  Positive for dyspnea on exertion. Negative for chest pain, claudication, cyanosis, irregular heartbeat, leg swelling, near-syncope, orthopnea, palpitations, paroxysmal nocturnal dyspnea and syncope.   Respiratory:  Negative for cough, hemoptysis, shortness of breath, sleep disturbances due to breathing and wheezing.    Hematologic/Lymphatic: Negative for bleeding problem. Does not bruise/bleed easily.   Skin:  Negative for poor wound healing.   Musculoskeletal:  Negative for muscle cramps and myalgias.   Gastrointestinal:  Negative for abdominal pain, anorexia, diarrhea, heartburn, hematemesis, hematochezia, melena, nausea and  vomiting.   Genitourinary:  Negative for hematuria and nocturia.   Neurological:  Positive for light-headedness. Negative for excessive daytime sleepiness, dizziness, focal weakness and weakness.       Physical Exam  Vitals:    05/22/25 1045   BP: 102/66   Pulse: 74     Wt Readings from Last 1 Encounters:   05/22/25 97.9 kg (215 lb 12.8 oz)     Physical Exam  Vitals and nursing note reviewed.   Constitutional:       General: He is not in acute distress.     Appearance: He is not toxic-appearing or diaphoretic.   HENT:      Head: Normocephalic and atraumatic.      Mouth/Throat:      Lips: Pink.      Mouth: Mucous membranes are moist.   Eyes:      General: No scleral icterus.     Conjunctiva/sclera: Conjunctivae normal.   Neck:      Thyroid: No thyromegaly.      Vascular: No carotid bruit, hepatojugular reflux or JVD.      Trachea: Trachea normal.   Cardiovascular:      Rate and Rhythm: Normal rate and regular rhythm. No extrasystoles are present.     Chest Wall: PMI is not displaced.      Pulses:           Carotid pulses are 2+ on the right side and 2+ on the left side.       Radial pulses are 2+ on the right side and 2+ on the left side.        Dorsalis pedis pulses are 2+ on the right side and 2+ on the left side.      Heart sounds: S1 normal and S2 normal. Murmur heard.      Harsh midsystolic murmur is present with a grade of 2/6 at the upper right sternal border radiating to the neck.      No friction rub. No S3 or S4 sounds.   Pulmonary:      Effort: Pulmonary effort is normal. No tachypnea, bradypnea, accessory muscle usage or respiratory distress.      Breath sounds: Normal breath sounds and air entry. No decreased breath sounds, wheezing, rhonchi or rales.   Abdominal:      General: Bowel sounds are normal. There is no distension or abdominal bruit.      Palpations: Abdomen is soft. There is no hepatomegaly, splenomegaly or pulsatile mass.      Tenderness: There is no abdominal tenderness.   Musculoskeletal:          General: No tenderness or deformity.      Right lower leg: No edema.      Left lower leg: No edema.   Skin:     General: Skin is warm and dry.      Capillary Refill: Capillary refill takes less than 2 seconds.      Coloration: Skin is not cyanotic or pale.      Findings: Petechiae present.      Nails: There is no clubbing.   Neurological:      General: No focal deficit present.      Mental Status: He is alert and oriented to person, place, and time.   Psychiatric:         Attention and Perception: Attention normal.         Mood and Affect: Mood normal.         Speech: Speech normal.         Behavior: Behavior normal. Behavior is cooperative.         Labs  Hospital Outpatient Visit on 05/19/2025   Component Date Value Ref Range Status    BSA 05/19/2025 2.24  m2 Final    LVOT stroke volume 05/19/2025 45.2  cm3 Final    LVIDd 05/19/2025 4.7  3.5 - 6.0 cm Final    LV Systolic Volume 05/19/2025 39  mL Final    LV Systolic Volume Index 05/19/2025 17.6  mL/m2 Final    LVIDs 05/19/2025 3.1  2.1 - 4.0 cm Final    LV ESV A2C 05/19/2025 56.14  mL Final    LV Diastolic Volume 05/19/2025 102  mL Final    LV ESV A4C 05/19/2025 76.98  mL Final    LV Diastolic Volume Index 05/19/2025 45.95  mL/m2 Final    Left Ventricular End Systolic Volu* 05/19/2025 39.02  mL Final    Left Ventricular End Diastolic Vol* 05/19/2025 102.41  mL Final    IVS 05/19/2025 0.8  0.6 - 1.1 cm Final    LVOT diameter 05/19/2025 2.0  cm Final    LVOT area 05/19/2025 3.1  cm2 Final    FS 05/19/2025 34.0  28 - 44 % Final    Left Ventricle Relative Wall Thick* 05/19/2025 0.34  cm Final    PW 05/19/2025 0.8  0.6 - 1.1 cm Final    LV mass 05/19/2025 122.3  g Final    LV Mass Index 05/19/2025 55.1  g/m2 Final    MV Peak E Elmo 05/19/2025 0.86  m/s Final    TDI LATERAL 05/19/2025 0.05  m/s Final    TDI SEPTAL 05/19/2025 0.05  m/s Final    E/E' ratio 05/19/2025 17  m/s Final    MV Peak A Elmo 05/19/2025 1.16  m/s Final    TR Max Elmo 05/19/2025 2.6  m/s Final     E/A ratio 05/19/2025 0.74   Final    E wave deceleration time 05/19/2025 94  msec Final    LV SEPTAL E/E' RATIO 05/19/2025 17.2  m/s Final    LV LATERAL E/E' RATIO 05/19/2025 17.2  m/s Final    LVOT peak elmo 05/19/2025 0.6  m/s Final    Left Ventricular Outflow Tract Sophia* 05/19/2025 0.48  cm/s Final    Left Ventricular Outflow Tract Sophia* 05/19/2025 1.00  mmHg Final    RV- suarez basal diam 05/19/2025 2.2  cm Final    RV S' 05/19/2025 13.61  cm/s Final    RV/LV Ratio 05/19/2025 0.47  cm Final    LA size 05/19/2025 4.1  cm Final    Left Atrium Minor Axis 05/19/2025 4.8  cm Final    Left Atrium Major Axis 05/19/2025 5.5  cm Final    LA Vol (MOD) 05/19/2025 66  mL Final    HUNTER (MOD) 05/19/2025 30  mL/m2 Final    RA Major Axis 05/19/2025 5.94  cm Final    AV regurgitation pressure 1/2 time 05/19/2025 453  ms Final    AR Max Elmo 05/19/2025 3.97  m/s Final    AV mean gradient 05/19/2025 15  mmHg Final    AV peak gradient 05/19/2025 29  mmHg Final    Ao peak elmo 05/19/2025 2.7  m/s Final    Ao VTI 05/19/2025 54.2  cm Final    LVOT peak VTI 05/19/2025 14.4  cm Final    AV valve area 05/19/2025 0.8  cm² Final    AV Velocity Ratio 05/19/2025 0.22   Final    AV index (prosthetic) 05/19/2025 0.27   Final    DICK by Velocity Ratio 05/19/2025 0.7  cm² Final    Mr max elmo 05/19/2025 1.95  m/s Final    MV stenosis pressure 1/2 time 05/19/2025 27.19  ms Final    MV valve area p 1/2 method 05/19/2025 8.09  cm2 Final    Triscuspid Valve Regurgitation Pea* 05/19/2025 27  mmHg Final    PV PEAK VELOCITY 05/19/2025 0.95  m/s Final    PV peak gradient 05/19/2025 4  mmHg Final    Pulmonary Valve Mean Velocity 05/19/2025 0.68  m/s Final    Ao root annulus 05/19/2025 3.4  cm Final    STJ 05/19/2025 2.7  cm Final    Ascending aorta 05/19/2025 2.5  cm Final    ASI 05/19/2025 1.1  cm/m2 Final    IVC diameter 05/19/2025 1.93  cm Final    Mean e' 05/19/2025 0.05  m/s Final    ZLVIDS 05/19/2025 -3.30   Final    ZLVIDD 05/19/2025 -5.01   Final    LA  area A4C 05/19/2025 23.45  cm2 Final    LA area A2C 05/19/2025 20.44  cm2 Final    RVDD 05/19/2025 2.23  cm Final    HUNTER 05/19/2025 31  mL/m2 Final    LA Vol 05/19/2025 70  cm3 Final    TAPSE 05/19/2025 1.7  cm Final    LA WIDTH 05/19/2025 3.9  cm Final    Left Atrium Area-systolic Apical T* 05/19/2025 21.0  cm2 Final    Left Atrium Area-systolic Four Jess* 05/19/2025 23.0  cm2 Final    RA Width 05/19/2025 3.9  cm Final    Sinus 05/19/2025 2.8  cm Final    ASI 05/19/2025 1.3  cm/m2 Final    TV resting pulmonary artery pressu* 05/19/2025 30  mmHg Final    RV TB RVSP 05/19/2025 6  mmHg Final    Est. RA pres 05/19/2025 3  mmHg Final    GLS 05/19/2025 12.3  % Final   Lab Visit on 05/19/2025   Component Date Value Ref Range Status    Sodium 05/19/2025 140  136 - 145 mmol/L Final    Potassium 05/19/2025 4.6  3.5 - 5.1 mmol/L Final    Chloride 05/19/2025 110  95 - 110 mmol/L Final    CO2 05/19/2025 23  23 - 29 mmol/L Final    Glucose 05/19/2025 95  70 - 110 mg/dL Final    BUN 05/19/2025 23  8 - 23 mg/dL Final    Creatinine 05/19/2025 0.9  0.5 - 1.4 mg/dL Final    Calcium 05/19/2025 9.5  8.7 - 10.5 mg/dL Final    Protein Total 05/19/2025 6.9  6.0 - 8.4 gm/dL Final    Albumin 05/19/2025 3.7  3.5 - 5.2 g/dL Final    Bilirubin Total 05/19/2025 0.7  0.1 - 1.0 mg/dL Final    For infants and newborns, interpretation of results should be based   on gestational age, weight and in agreement with clinical   observations.    Premature Infant recommended reference ranges:   0-24 hours:  <8.0 mg/dL   24-48 hours: <12.0 mg/dL   3-5 days:    <15.0 mg/dL   6-29 days:   <15.0 mg/dL    ALP 05/19/2025 51  40 - 150 unit/L Final    AST 05/19/2025 19  11 - 45 unit/L Final    ALT 05/19/2025 15  10 - 44 unit/L Final    Anion Gap 05/19/2025 7 (L)  8 - 16 mmol/L Final    eGFR 05/19/2025 >60  >60 mL/min/1.73/m2 Final    Estimated GFR calculated using the CKD-EPI creatinine (2021) equation.    WBC 05/19/2025 5.95  3.90 - 12.70 K/uL Final    RBC  05/19/2025 4.25 (L)  4.60 - 6.20 M/uL Final    HGB 05/19/2025 14.0  14.0 - 18.0 gm/dL Final    HCT 05/19/2025 41.4  40.0 - 54.0 % Final    MCV 05/19/2025 97  82 - 98 fL Final    MCH 05/19/2025 32.9 (H)  27.0 - 31.0 pg Final    MCHC 05/19/2025 33.8  32.0 - 36.0 g/dL Final    RDW 05/19/2025 12.7  11.5 - 14.5 % Final    Platelet Count 05/19/2025 152  150 - 450 K/uL Final    MPV 05/19/2025 10.0  9.2 - 12.9 fL Final    Nucleated RBC 05/19/2025 0  <=0 /100 WBC Final    Neut % 05/19/2025 69.2  38 - 73 % Final    Lymph % 05/19/2025 16.0 (L)  18 - 48 % Final    Mono % 05/19/2025 12.3  4 - 15 % Final    Eos % 05/19/2025 1.5  <=8 % Final    Basophil % 05/19/2025 0.7  <=1.9 % Final    Imm Grans % 05/19/2025 0.3  0.0 - 0.5 % Final    Neut # 05/19/2025 4.12  1.8 - 7.7 K/uL Final    Lymph # 05/19/2025 0.95 (L)  1 - 4.8 K/uL Final    Mono # 05/19/2025 0.73  0.3 - 1 K/uL Final    Eos # 05/19/2025 0.09  <=0.5 K/uL Final    Baso # 05/19/2025 0.04  <=0.2 K/uL Final    Imm Grans # 05/19/2025 0.02  0.00 - 0.04 K/uL Final    Mild elevation in immature granulocytes is non specific and can be seen in a variety of conditions including stress response, acute inflammation, trauma and pregnancy. Correlation with other laboratory and clinical findings is essential.   Office Visit on 05/12/2025   Component Date Value Ref Range Status    QRS Duration 05/12/2025 98  ms Final    [facility] QTC Calculation 05/12/2025 434  ms Final       Imaging  Echo  Result Date: 5/19/2025    Left Ventricle: The left ventricle is normal in size. Normal wall thickness. Global hypokinesis present. There is mildly reduced systolic function with a visually estimated ejection fraction of 45 - 50%.  measuring -12.3% Grade I diastolic dysfunction.   Right Ventricle: The right ventricle is normal in size Wall thickness is normal. Systolic function is normal.   Aortic Valve: There is moderate to severe stenosis. Aortic valve peak velocity is 2.7 m/s. Mean gradient is 15 mmHg. The  dimensionless index is 0.27.   Tricuspid Valve: There is mild regurgitation.       Assessment  1. Nonrheumatic aortic valve stenosis  Moderate to severe and symptomatic  - Protime-INR; Future  - Place in Outpatient; Standing  - Vital signs; Standing  - Start or verify patency of one 18 g, left arm preferred prior to procedure; Standing  - Verify informed consent, place on front of chart; Standing  - Void on call to Cath Lab; Standing  - Clip and Prep Other (please specifiy) (Operative site); Standing  - Diet NPO Except for: Medication; Standing  - Case Request-Cath Lab: ANGIOGRAM, CORONARY ARTERY; Standing  - Case Request-Cath Lab: ANGIOGRAM, CORONARY ARTERY    2. Chronic diastolic heart failure  Compensated      Plan and Discussion    Start the workup for TAVR.  Coronary angiography. The procedure including its risks, benefits and alternatives were discussed in detail.  All questions were answered.  After shared decision making, appropriate consents were signed.        Alberto Montes MD         [1]   Current Outpatient Medications   Medication Sig Dispense Refill    celecoxib (CELEBREX) 200 MG capsule TAKE 1 CAPSULE (200 MG TOTAL) BY MOUTH DAILY AS NEEDED FOR PAIN. TAKE WITH FOOD 30 capsule 3    cholecalciferol, vitamin D3, (VITAMIN D3) 25 mcg (1,000 unit) capsule Take 1,000 Units by mouth once daily.      colchicine (MITIGARE) 0.6 mg Cap Take 1 capsule (0.6 mg total) by mouth once daily. 30 capsule 5    febuxostat (ULORIC) 80 mg Tab TAKE 1 TABLET DAILY 90 tablet 3    irbesartan (AVAPRO) 300 MG tablet TAKE 1 TABLET BY MOUTH EVERY DAY 90 tablet 1    melatonin 5 mg Cap Take 1 capsule by mouth nightly as needed (insomnia).      simvastatin (ZOCOR) 80 MG tablet Take 1 tablet (80 mg total) by mouth every evening. 90 tablet 3     Current Facility-Administered Medications   Medication Dose Route Frequency Provider Last Rate Last Admin    sodium chloride 0.9% flush 10 mL  10 mL Intravenous PRN Alberto Montes MD        [2]   Social History  Socioeconomic History    Marital status:     Number of children: 2   Occupational History    Occupation: [employer]   Tobacco Use    Smoking status: Former     Current packs/day: 0.00     Average packs/day: 2.0 packs/day for 30.0 years (60.0 ttl pk-yrs)     Types: Cigarettes     Start date: 1962     Quit date: 1992     Years since quittin.4    Smokeless tobacco: Never   Substance and Sexual Activity    Alcohol use: Yes     Alcohol/week: 15.0 standard drinks of alcohol     Types: 15 Shots of liquor per week    Drug use: No    Sexual activity: Not Currently   Social History Narrative    Walks 30 min 3x/wks. Weights 2-3/wk.    Goes to [place] every July.     - retired.     - he goes to the gym 5-7 days per week     Social Drivers of Health     Financial Resource Strain: Low Risk  (2024)    Overall Financial Resource Strain (CARDIA)     Difficulty of Paying Living Expenses: Not very hard   Food Insecurity: No Food Insecurity (2024)    Hunger Vital Sign     Worried About Running Out of Food in the Last Year: Never true     Ran Out of Food in the Last Year: Never true   Transportation Needs: No Transportation Needs (2024)    PRAPARE - Transportation     Lack of Transportation (Medical): No     Lack of Transportation (Non-Medical): No   Physical Activity: Sufficiently Active (2024)    Exercise Vital Sign     Days of Exercise per Week: 6 days     Minutes of Exercise per Session: 40 min   Stress: No Stress Concern Present (2024)    [employer] [employer] - Occupational Stress Questionnaire     Feeling of Stress : Not at all   Housing Stability: Low Risk  (2024)    Housing Stability Vital Sign     Unable to Pay for Housing in the Last Year: No     Number of Places Lived in the Last Year: 1     Unstable Housing in the Last Year: No

## 2025-05-22 NOTE — PATIENT INSTRUCTIONS
Procedure: Cardiac Catheterization  Procedure date: Wednesday - May 28th, 2025  Procedure time: 10:00AM    Arrive at the Outpatient Surgery Unit ([facility]) at 8:00AM.  Nothing to eat or drink after midnight.  Take all morning medication with a sip of water.  Please make arrangements for a family member or friend to bring you home after the procedure. You will not be able to drive home.        If you have any questions, please call our office at [phone].      Thanks,  Steph Alfortish [facility]  [address] [place] [id number]    (O) [phone]  (F) [phone]

## 2025-05-30 ENCOUNTER — DOCUMENTATION ONLY (OUTPATIENT)
Dept: INTERNAL MEDICINE | Facility: CLINIC | Age: 80
End: 2025-05-30
Payer: MEDICARE

## 2025-05-30 DIAGNOSIS — I35.0 NONRHEUMATIC AORTIC VALVE STENOSIS: ICD-10-CM

## 2025-05-30 DIAGNOSIS — I10 ESSENTIAL HYPERTENSION: Primary | ICD-10-CM

## 2025-05-30 DIAGNOSIS — E03.9 HYPOTHYROIDISM, UNSPECIFIED TYPE: ICD-10-CM

## 2025-05-30 DIAGNOSIS — Z78.9 KNOWN MEDICAL PROBLEMS: ICD-10-CM

## 2025-06-04 ENCOUNTER — HOSPITAL ENCOUNTER (OUTPATIENT)
Facility: OTHER | Age: 80
Discharge: HOME | End: 2025-06-04
Attending: INTERNAL MEDICINE | Admitting: INTERNAL MEDICINE
Payer: MEDICARE

## 2025-06-04 VITALS
RESPIRATION RATE: 18 BRPM | OXYGEN SATURATION: 95 % | HEART RATE: 58 BPM | DIASTOLIC BLOOD PRESSURE: 70 MMHG | TEMPERATURE: 98 F | SYSTOLIC BLOOD PRESSURE: 151 MMHG

## 2025-06-04 DIAGNOSIS — I35.0 NONRHEUMATIC AORTIC VALVE STENOSIS: ICD-10-CM

## 2025-06-04 PROBLEM — I25.118 ATHEROSCLEROSIS OF NATIVE CORONARY ARTERY OF NATIVE HEART WITH STABLE ANGINA PECTORIS: Status: ACTIVE | Noted: 2025-06-04

## 2025-06-04 PROBLEM — I50.42 CHRONIC COMBINED SYSTOLIC AND DIASTOLIC HEART FAILURE: Status: ACTIVE | Noted: 2024-01-08

## 2025-06-04 PROCEDURE — 99152 MOD SED SAME PHYS/QHP 5/>YRS: CPT | Performed by: INTERNAL MEDICINE

## 2025-06-04 PROCEDURE — 93454 CORONARY ARTERY ANGIO S&I: CPT | Mod: 26,,, | Performed by: INTERNAL MEDICINE

## 2025-06-04 PROCEDURE — 99153 MOD SED SAME PHYS/QHP EA: CPT | Performed by: INTERNAL MEDICINE

## 2025-06-04 PROCEDURE — C1887 CATHETER, GUIDING: HCPCS | Performed by: INTERNAL MEDICINE

## 2025-06-04 PROCEDURE — 93454 CORONARY ARTERY ANGIO S&I: CPT | Performed by: INTERNAL MEDICINE

## 2025-06-04 PROCEDURE — 25000003 PHARM REV CODE 250: Performed by: INTERNAL MEDICINE

## 2025-06-04 PROCEDURE — 63600175 PHARM REV CODE 636 W HCPCS: Performed by: INTERNAL MEDICINE

## 2025-06-04 PROCEDURE — C1894 INTRO/SHEATH, NON-LASER: HCPCS | Performed by: INTERNAL MEDICINE

## 2025-06-04 PROCEDURE — 99152 MOD SED SAME PHYS/QHP 5/>YRS: CPT | Mod: ,,, | Performed by: INTERNAL MEDICINE

## 2025-06-04 PROCEDURE — 25500020 PHARM REV CODE 255: Performed by: INTERNAL MEDICINE

## 2025-06-04 PROCEDURE — C1769 GUIDE WIRE: HCPCS | Performed by: INTERNAL MEDICINE

## 2025-06-04 RX ORDER — LIDOCAINE HYDROCHLORIDE 10 MG/ML
INJECTION, SOLUTION EPIDURAL; INFILTRATION; INTRACAUDAL; PERINEURAL
Status: DISCONTINUED | OUTPATIENT
Start: 2025-06-04 | End: 2025-06-04 | Stop reason: HOSPADM

## 2025-06-04 RX ORDER — HEPARIN SODIUM 1000 [USP'U]/ML
INJECTION, SOLUTION INTRAVENOUS; SUBCUTANEOUS
Status: DISCONTINUED | OUTPATIENT
Start: 2025-06-04 | End: 2025-06-04 | Stop reason: HOSPADM

## 2025-06-04 RX ORDER — SODIUM CHLORIDE 9 MG/ML
INJECTION, SOLUTION INTRAVENOUS CONTINUOUS
Status: DISCONTINUED | OUTPATIENT
Start: 2025-06-04 | End: 2025-06-04 | Stop reason: HOSPADM

## 2025-06-04 RX ORDER — ACETAMINOPHEN 325 MG/1
650 TABLET ORAL EVERY 4 HOURS PRN
Status: DISCONTINUED | OUTPATIENT
Start: 2025-06-04 | End: 2025-06-04 | Stop reason: HOSPADM

## 2025-06-04 RX ORDER — SODIUM CHLORIDE 9 MG/ML
INJECTION, SOLUTION INTRAVENOUS CONTINUOUS
Status: ACTIVE | OUTPATIENT
Start: 2025-06-04 | End: 2025-06-04

## 2025-06-04 RX ORDER — MIDAZOLAM HYDROCHLORIDE 1 MG/ML
INJECTION INTRAMUSCULAR; INTRAVENOUS
Status: DISCONTINUED | OUTPATIENT
Start: 2025-06-04 | End: 2025-06-04 | Stop reason: HOSPADM

## 2025-06-04 RX ORDER — OXYCODONE AND ACETAMINOPHEN 7.5; 325 MG/1; MG/1
TABLET ORAL
COMMUNITY

## 2025-06-04 RX ORDER — HEPARIN SOD,PORCINE/0.9 % NACL 1000/500ML
INTRAVENOUS SOLUTION INTRAVENOUS
Status: DISCONTINUED | OUTPATIENT
Start: 2025-06-04 | End: 2025-06-04 | Stop reason: HOSPADM

## 2025-06-04 RX ORDER — FENTANYL CITRATE 50 UG/ML
INJECTION, SOLUTION INTRAMUSCULAR; INTRAVENOUS
Status: DISCONTINUED | OUTPATIENT
Start: 2025-06-04 | End: 2025-06-04 | Stop reason: HOSPADM

## 2025-06-04 RX ORDER — ONDANSETRON 8 MG/1
8 TABLET, ORALLY DISINTEGRATING ORAL EVERY 8 HOURS PRN
Status: DISCONTINUED | OUTPATIENT
Start: 2025-06-04 | End: 2025-06-04 | Stop reason: HOSPADM

## 2025-06-04 RX ADMIN — SODIUM CHLORIDE: 9 INJECTION, SOLUTION INTRAVENOUS at 07:06

## 2025-06-04 RX ADMIN — SODIUM CHLORIDE: 9 INJECTION, SOLUTION INTRAVENOUS at 09:06

## 2025-06-12 ENCOUNTER — EDUCATION (OUTPATIENT)
Dept: CARDIOLOGY | Facility: CLINIC | Age: 80
End: 2025-06-12

## 2025-06-12 ENCOUNTER — OFFICE VISIT (OUTPATIENT)
Dept: CARDIOLOGY | Facility: CLINIC | Age: 80
End: 2025-06-12
Payer: MEDICARE

## 2025-06-12 ENCOUNTER — LAB VISIT (OUTPATIENT)
Dept: LAB | Facility: HOSPITAL | Age: 80
End: 2025-06-12
Attending: INTERNAL MEDICINE
Payer: MEDICARE

## 2025-06-12 VITALS
HEIGHT: 73 IN | WEIGHT: 216.06 LBS | OXYGEN SATURATION: 97 % | SYSTOLIC BLOOD PRESSURE: 116 MMHG | BODY MASS INDEX: 28.63 KG/M2 | HEART RATE: 81 BPM | DIASTOLIC BLOOD PRESSURE: 56 MMHG

## 2025-06-12 DIAGNOSIS — I35.0 NONRHEUMATIC AORTIC VALVE STENOSIS: ICD-10-CM

## 2025-06-12 DIAGNOSIS — I25.118 ATHEROSCLEROSIS OF NATIVE CORONARY ARTERY OF NATIVE HEART WITH STABLE ANGINA PECTORIS: Primary | ICD-10-CM

## 2025-06-12 DIAGNOSIS — I35.0 NONRHEUMATIC AORTIC VALVE STENOSIS: Primary | ICD-10-CM

## 2025-06-12 DIAGNOSIS — I35.0 AORTIC VALVE STENOSIS, ETIOLOGY OF CARDIAC VALVE DISEASE UNSPECIFIED: ICD-10-CM

## 2025-06-12 DIAGNOSIS — I50.42 CHRONIC COMBINED SYSTOLIC AND DIASTOLIC HEART FAILURE: ICD-10-CM

## 2025-06-12 DIAGNOSIS — I10 ESSENTIAL HYPERTENSION: ICD-10-CM

## 2025-06-12 LAB
ANION GAP (OHS): 6 MMOL/L (ref 8–16)
BUN SERPL-MCNC: 21 MG/DL (ref 8–23)
CALCIUM SERPL-MCNC: 9 MG/DL (ref 8.7–10.5)
CHLORIDE SERPL-SCNC: 107 MMOL/L (ref 95–110)
CO2 SERPL-SCNC: 24 MMOL/L (ref 23–29)
CREAT SERPL-MCNC: 0.8 MG/DL (ref 0.5–1.4)
ERYTHROCYTE [DISTWIDTH] IN BLOOD BY AUTOMATED COUNT: 13 % (ref 11.5–14.5)
GFR SERPLBLD CREATININE-BSD FMLA CKD-EPI: >60 ML/MIN/1.73/M2
GLUCOSE SERPL-MCNC: 95 MG/DL (ref 70–110)
HCT VFR BLD AUTO: 41.1 % (ref 40–54)
HGB BLD-MCNC: 13.4 GM/DL (ref 14–18)
INDIRECT COOMBS: NORMAL
MCH RBC QN AUTO: 32.1 PG (ref 27–31)
MCHC RBC AUTO-ENTMCNC: 32.6 G/DL (ref 32–36)
MCV RBC AUTO: 99 FL (ref 82–98)
PLATELET # BLD AUTO: 168 K/UL (ref 150–450)
PMV BLD AUTO: 10.3 FL (ref 9.2–12.9)
POTASSIUM SERPL-SCNC: 4.1 MMOL/L (ref 3.5–5.1)
RBC # BLD AUTO: 4.17 M/UL (ref 4.6–6.2)
RH BLD: NORMAL
SODIUM SERPL-SCNC: 137 MMOL/L (ref 136–145)
SPECIMEN OUTDATE: NORMAL
WBC # BLD AUTO: 7.62 K/UL (ref 3.9–12.7)

## 2025-06-12 PROCEDURE — 36415 COLL VENOUS BLD VENIPUNCTURE: CPT

## 2025-06-12 PROCEDURE — 99214 OFFICE O/P EST MOD 30 MIN: CPT | Mod: PBBFAC | Performed by: INTERNAL MEDICINE

## 2025-06-12 PROCEDURE — 99999 PR PBB SHADOW E&M-EST. PATIENT-LVL IV: CPT | Mod: PBBFAC,,, | Performed by: INTERNAL MEDICINE

## 2025-06-12 PROCEDURE — 86901 BLOOD TYPING SEROLOGIC RH(D): CPT | Performed by: INTERNAL MEDICINE

## 2025-06-12 RX ORDER — ASPIRIN 325 MG
325 TABLET ORAL ONCE
OUTPATIENT
Start: 2025-06-12 | End: 2025-06-12

## 2025-06-12 RX ORDER — DIPHENHYDRAMINE HCL 50 MG
50 CAPSULE ORAL ONCE
OUTPATIENT
Start: 2025-06-12 | End: 2025-06-12

## 2025-06-12 RX ORDER — CLOPIDOGREL BISULFATE 300 MG/1
600 TABLET, FILM COATED ORAL ONCE
OUTPATIENT
Start: 2025-06-12 | End: 2025-06-12

## 2025-06-12 NOTE — H&P (VIEW-ONLY)
History of Present Illness    CHIEF COMPLAINT:  Patient presents today with dyspnea and decreased stamina while walking.    CARDIOPULMONARY:  He reports dyspnea and decreased stamina that began in January during a trip to [place] while walking through the airport. He experiences near-syncope with postural changes. He denies chest pain, angina, and syncope.    PERIPHERAL EDEMA:  He has experienced edema for 1 year with associated scaliness treated with ointment. He previously took Lasix but discontinued due to frequent urination.    MUSCULOSKELETAL:  He has a history of right knee problems requiring cushion injections every 6 months. He also has back issues managed with therapy, with no severe pain and no impact on sleep.    RECENT SURGERY:  He underwent oral surgery 1 week ago for implant removal and surgical sealing of the sinus. He is currently taking Amoxicillin for infection prevention.    CURRENT MEDICATIONS:  He takes Celebrex for joint pain, Colchicine as needed for acute gout attacks, Avapro for BP management, and Simvastatin for cholesterol control.      Past Medical History:   Diagnosis Date    Abnormal stress echo 10/12/2015    9/15/2015: Stress Echo: 6:00 min. No CP. ECG negative but frequent VPCs. Echo negative.     Aortic valve stenosis     Family history of premature CAD 01/22/2013    Gout, unspecified 01/22/2013    High cholesterol 01/22/2013    Hypertension, benign 01/22/2013    1995: Diagnosed.     Normal cardiac stress test 01/22/2013    Ureteral stone 01/22/2013       Past Surgical History:   Procedure Laterality Date    ADENOIDECTOMY      CORONARY ANGIOGRAPHY N/A 6/4/2025    Procedure: ANGIOGRAM, CORONARY ARTERY;  Surgeon: Alberto Montes MD;  [facility]: [facility] CATH LAB;  Service: Cardiology;  Laterality: N/A;  right radial    TONSILLECTOMY         Medications Ordered Prior to Encounter[1]    Review of patient's allergies indicates:  No Known Allergies    Social History[2]    Family History  "  Problem Relation Name Age of Onset    Cancer Mother      Heart disease Father  51        MI    Diabetes Paternal Grandfather          Objective:     Vitals:    06/12/25 0906 06/12/25 0907   BP: (!) 111/55 (!) 116/56   BP Location: Right arm Left arm   Patient Position: Sitting Sitting   Pulse: 97 81   SpO2: 97% 97%   Weight: 98 kg (216 lb 0.8 oz) 98 kg (216 lb 0.8 oz)   Height: 6' 1" (1.854 m) 6' 1" (1.854 m)       ROS:  General: -fever, -chills, +fatigue, -weight gain, -weight loss  Cardiovascular: -chest pain, -palpitations, +lower extremity edema  Respiratory: -cough, -shortness of breath, +exertional dyspnea  Musculoskeletal: +joint pain, -muscle pain, +back pain  Neurological: -headache, +dizziness, -numbness, -tingling, +orthostatic symptoms    Physical Exam    General: No acute distress. Well-developed. Well-nourished.  Eyes: EOMI. Sclerae anicteric.  HENT: Normocephalic. Atraumatic. Nares patent. Moist oral mucosa.  Cardiovascular: Regular rate. Regular rhythm. Mid-to-late peaking harsh systolic murmur  Respiratory: Normal respiratory effort. Clear to auscultation bilaterally. No rales. No rhonchi. No wheezing.  Abdomen: Soft. Non-tender. Non-distended. Normoactive bowel sounds.  Musculoskeletal: No  obvious deformity.  Extremities: No lower extremity edema.  Neurological: Alert & oriented x3. No slurred speech. Normal gait.  Psychiatric: Normal mood. Normal affect. Good insight. Good judgment.  Skin: Warm. Dry. No rash.       Assessmen/Plan   Nonrheumatic aortic valve stenosis  - Recommend attempting PCI for  of LAD as first step.  - If PCI successful, we will reassess symptoms and consider TAVR for aortic valve at later date.  - If PCI unsuccessful, we will discuss proceeding with TAVR alone or consider referral for CABG + surgical AVR as alternative option.  - Percutaneous approach strongly preferred by Mr. Dorantes and his wife.  - Ordered cardiac catheterization for attempted PCI of LAD .  - " Explained risks and benefits of percutaneous vs surgical approaches.      Atherosclerosis of native coronary artery of native heart with stable angina pectoris  - Recommend attempting PCI for  of LAD as first step.                   [1]   Current Outpatient Medications on File Prior to Visit   Medication Sig Dispense Refill    celecoxib (CELEBREX) 200 MG capsule TAKE 1 CAPSULE (200 MG TOTAL) BY MOUTH DAILY AS NEEDED FOR PAIN. TAKE WITH FOOD 30 capsule 3    cholecalciferol, vitamin D3, (VITAMIN D3) 25 mcg (1,000 unit) capsule Take 1,000 Units by mouth once daily.      colchicine (MITIGARE) 0.6 mg Cap Take 1 capsule (0.6 mg total) by mouth once daily. 30 capsule 5    febuxostat (ULORIC) 80 mg Tab TAKE 1 TABLET DAILY 90 tablet 3    irbesartan (AVAPRO) 300 MG tablet TAKE 1 TABLET BY MOUTH EVERY DAY 90 tablet 1    melatonin 5 mg Cap Take 1 capsule by mouth nightly as needed (insomnia).      oxyCODONE-acetaminophen (PERCOCET) 7.5-325 mg per tablet Take by mouth.      simvastatin (ZOCOR) 80 MG tablet Take 1 tablet (80 mg total) by mouth every evening. 90 tablet 3     Current Facility-Administered Medications on File Prior to Visit   Medication Dose Route Frequency Provider Last Rate Last Admin    sodium chloride 0.9% flush 10 mL  10 mL Intravenous PRN Alberto Montes MD       [2]   Social History  Tobacco Use    Smoking status: Former     Current packs/day: 0.00     Average packs/day: 2.0 packs/day for 30.0 years (60.0 ttl pk-yrs)     Types: Cigarettes     Start date: 1962     Quit date: 1992     Years since quittin.4    Smokeless tobacco: Never   Substance Use Topics    Alcohol use: Yes     Alcohol/week: 15.0 standard drinks of alcohol     Types: 15 Shots of liquor per week    Drug use: No

## 2025-06-12 NOTE — PROGRESS NOTES
History of Present Illness    CHIEF COMPLAINT:  Patient presents today with dyspnea and decreased stamina while walking.    CARDIOPULMONARY:  He reports dyspnea and decreased stamina that began in January during a trip to [place] while walking through the airport. He experiences near-syncope with postural changes. He denies chest pain, angina, and syncope.    PERIPHERAL EDEMA:  He has experienced edema for 1 year with associated scaliness treated with ointment. He previously took Lasix but discontinued due to frequent urination.    MUSCULOSKELETAL:  He has a history of right knee problems requiring cushion injections every 6 months. He also has back issues managed with therapy, with no severe pain and no impact on sleep.    RECENT SURGERY:  He underwent oral surgery 1 week ago for implant removal and surgical sealing of the sinus. He is currently taking Amoxicillin for infection prevention.    CURRENT MEDICATIONS:  He takes Celebrex for joint pain, Colchicine as needed for acute gout attacks, Avapro for BP management, and Simvastatin for cholesterol control.      Past Medical History:   Diagnosis Date    Abnormal stress echo 10/12/2015    9/15/2015: Stress Echo: 6:00 min. No CP. ECG negative but frequent VPCs. Echo negative.     Aortic valve stenosis     Family history of premature CAD 01/22/2013    Gout, unspecified 01/22/2013    High cholesterol 01/22/2013    Hypertension, benign 01/22/2013    1995: Diagnosed.     Normal cardiac stress test 01/22/2013    Ureteral stone 01/22/2013       Past Surgical History:   Procedure Laterality Date    ADENOIDECTOMY      CORONARY ANGIOGRAPHY N/A 6/4/2025    Procedure: ANGIOGRAM, CORONARY ARTERY;  Surgeon: Alberto Montes MD;  Location: [facility] CATH LAB;  Service: Cardiology;  Laterality: N/A;  right radial    TONSILLECTOMY         Medications Ordered Prior to Encounter[1]    Review of patient's allergies indicates:  No Known Allergies    Social History[2]    Family History  "  Problem Relation Name Age of Onset    Cancer Mother      Heart disease Father  51        MI    Diabetes Paternal Grandfather          Objective:     Vitals:    06/12/25 0906 06/12/25 0907   BP: (!) 111/55 (!) 116/56   BP Location: Right arm Left arm   Patient Position: Sitting Sitting   Pulse: 97 81   SpO2: 97% 97%   Weight: 98 kg (216 lb 0.8 oz) 98 kg (216 lb 0.8 oz)   Height: 6' 1" (1.854 m) 6' 1" (1.854 m)       ROS:  General: -fever, -chills, +fatigue, -weight gain, -weight loss  Cardiovascular: -chest pain, -palpitations, +lower extremity edema  Respiratory: -cough, -shortness of breath, +exertional dyspnea  Musculoskeletal: +joint pain, -muscle pain, +back pain  Neurological: -headache, +dizziness, -numbness, -tingling, +orthostatic symptoms    Physical Exam    General: No acute distress. Well-developed. Well-nourished.  Eyes: EOMI. Sclerae anicteric.  HENT: Normocephalic. Atraumatic. Nares patent. Moist oral mucosa.  Cardiovascular: Regular rate. Regular rhythm. Mid-to-late peaking harsh systolic murmur  Respiratory: Normal respiratory effort. Clear to auscultation bilaterally. No rales. No rhonchi. No wheezing.  Abdomen: Soft. Non-tender. Non-distended. Normoactive bowel sounds.  Musculoskeletal: No  obvious deformity.  Extremities: No lower extremity edema.  Neurological: Alert & oriented x3. No slurred speech. Normal gait.  Psychiatric: Normal mood. Normal affect. Good insight. Good judgment.  Skin: Warm. Dry. No rash.       Assessmen/Plan   Nonrheumatic aortic valve stenosis  - Recommend attempting PCI for  of LAD as first step.  - If PCI successful, we will reassess symptoms and consider TAVR for aortic valve at later date.  - If PCI unsuccessful, we will discuss proceeding with TAVR alone or consider referral for CABG + surgical AVR as alternative option.  - Percutaneous approach strongly preferred by Mr. Dorantes and his wife.  - Ordered cardiac catheterization for attempted PCI of LAD .  - " Explained risks and benefits of percutaneous vs surgical approaches.      Atherosclerosis of native coronary artery of native heart with stable angina pectoris  - Recommend attempting PCI for  of LAD as first step.                   [1]   Current Outpatient Medications on File Prior to Visit   Medication Sig Dispense Refill    celecoxib (CELEBREX) 200 MG capsule TAKE 1 CAPSULE (200 MG TOTAL) BY MOUTH DAILY AS NEEDED FOR PAIN. TAKE WITH FOOD 30 capsule 3    cholecalciferol, vitamin D3, (VITAMIN D3) 25 mcg (1,000 unit) capsule Take 1,000 Units by mouth once daily.      colchicine (MITIGARE) 0.6 mg Cap Take 1 capsule (0.6 mg total) by mouth once daily. 30 capsule 5    febuxostat (ULORIC) 80 mg Tab TAKE 1 TABLET DAILY 90 tablet 3    irbesartan (AVAPRO) 300 MG tablet TAKE 1 TABLET BY MOUTH EVERY DAY 90 tablet 1    melatonin 5 mg Cap Take 1 capsule by mouth nightly as needed (insomnia).      oxyCODONE-acetaminophen (PERCOCET) 7.5-325 mg per tablet Take by mouth.      simvastatin (ZOCOR) 80 MG tablet Take 1 tablet (80 mg total) by mouth every evening. 90 tablet 3     Current Facility-Administered Medications on File Prior to Visit   Medication Dose Route Frequency Provider Last Rate Last Admin    sodium chloride 0.9% flush 10 mL  10 mL Intravenous PRN Alberto Montes MD       [2]   Social History  Tobacco Use    Smoking status: Former     Current packs/day: 0.00     Average packs/day: 2.0 packs/day for 30.0 years (60.0 ttl pk-yrs)     Types: Cigarettes     Start date: 1962     Quit date: 1992     Years since quittin.4    Smokeless tobacco: Never   Substance Use Topics    Alcohol use: Yes     Alcohol/week: 15.0 standard drinks of alcohol     Types: 15 Shots of liquor per week    Drug use: No

## 2025-06-12 NOTE — PROGRESS NOTES
If you need to change the date of your procedure, please call  Lizbeth AGUILAR [phone]  CALL THE OFFICE IF YOU HAVE ANY MEDICATIONS CHANGES BEFORE A PROCEDURE.     OUTPATIENT CATHETERIZATION INSTRUCTIONS     You have been scheduled for a procedure in the catheterization lab on  Tuesday, June 17, 2025     Please report to the Cardiology Waiting Area on the Third floor of the hospital and check in at ARRIVAL TIME at 745am.   You will then be taken to the SSCU (Short Stay Cardiac Unit) and prepared for your procedure. Please be aware that this is not the time of your procedure but the time you are to arrive. The procedures are scheduled on an hourly basis; however, emergency cases take precedence over all other cases.         1. NOTHING TO EAT AFTER MIDNIGHT 12AM the morning of the procedure. NO FOOD.   You may take your medications with small sips of water. SEE BELOW INSTRUCTIONS ON MEDICATIONS.     2.  Continue with daily medications everyday including the morning of the procedure EXCEPT those meds instructed to STOP or HOLD.  Do not stop your Aspirin, Plavix, Effient, or Brilinta.  SEE BELOW.    3.  Diabetics:  NONE  IF TAKING OZEMPIC- THIS MUST BE HELD FOR ONE WEEK PRIOR TO PROCEDURE.    4. Heart Failure Patients:  NONE        The procedure will take 1-2 hours to perform. After the procedure, you will return to SSCU on the third floor of the hospital. You will need to lie still (or keep your arm still) for the next 4 to 6 hours to minimize bleeding from the puncture site. Your family may remain in the room with you during this time.         You may be able to be discharged home that same afternoon if there is someone to drive you home and there were no complications. If you have one of the balloon, stent, or device procedures you may spend the night in the hospital. Your doctor will determine, based on your progress, the date and time of your discharge. The results of your procedure will be discussed with you  before you are discharged. Any further testing or procedures will be scheduled for you either before you leave or you will be called with these appointments.   YOU WILL NOT BE ABLE TO DRIVE HOME  THE DAY OF THE PROCEDURE.      If you should have any questions, concerns, or please call Lizbeth [phone]        Special Instructions: IMPORTANT TO HYDRATE  Drink plenty of water the day before the procedure and the day after the procedure to flush your kidneys.     Continue daily medications, including aspirin the morning of the procedure.  See Special instructions for other medications.     IMPORTANT:  HOLD The following medications as directed:    HOLD VITAMINS -  Only the morning of the procedure, wait to take vitamins.  HOLD CELEBREX for 7 days before procedure.    DO NOT STOP ASPIRIN -  if taking daily, ok to take the morning of the procedure.             THE ABOVE INSTRUCTIONS WERE GIVEN TO THE PATIENT VERBALLY AND THEY VERBALIZED UNDERSTANDING.  THEY DO NOT REQUIRE ANY SPECIAL NEEDS AND DO NOT HAVE ANY LEARNING BARRIERS.          Directions for Reporting to Cardiology Waiting Area in the Hospital  If you park in the Parking Garage:  Take elevators to the1st floor of the parking garage.  Continue past the gift shop, coffee shop, and piano.  Take a right and go to the gold elevators. (Elevator B)  Take the elevator to the 3rd floor.  Follow the arrow on the sign on the wall that says Cath Lab Registration/EP Lab Registration.  Follow the long hallway all the way around until you come to a big open area.  This is the registration area.  Check in at Reception Desk.     OR     If family is dropping you off:  Have them drop you off at the front of the Hospital under the green overhang.  Enter through the doors and take a right.  Take the 'E' elevators to the 3rd floor Cardiology Waiting Area.  Check in at the Reception Desk in the waiting room.

## 2025-06-12 NOTE — ASSESSMENT & PLAN NOTE
- Recommend attempting PCI for  of LAD as first step.  - If PCI successful, plan to reassess symptoms and consider TAVR for aortic valve at later date.  - If PCI unsuccessful, we will discuss proceeding with TAVR alone or consider referral for CABG + surgical AVR as alternative option.  - Percutaneous approach strongly preferred by Mr. Dorantes and his wife.  - Ordered cardiac catheterization for attempted PCI of LAD .  - Explained risks and benefits of percutaneous vs surgical approaches.

## 2025-06-13 ENCOUNTER — PATIENT MESSAGE (OUTPATIENT)
Dept: CARDIOLOGY | Facility: CLINIC | Age: 80
End: 2025-06-13
Payer: MEDICARE

## 2025-06-13 ENCOUNTER — TELEPHONE (OUTPATIENT)
Dept: CARDIOLOGY | Facility: CLINIC | Age: 80
End: 2025-06-13
Payer: MEDICARE

## 2025-06-13 NOTE — TELEPHONE ENCOUNTER
Confirmed appt with Dr. Montes has been canceled on June 18. Appointment will be rescheduled once his work up with Dr. Ho is complete. Pt verbalized understanding.

## 2025-06-13 NOTE — TELEPHONE ENCOUNTER
Copied from CRM #[id number]. Topic: General Inquiry - Patient Advice  >> Jun 13, 2025  2:54 PM Sue wrote:  Type:  Patient Call          Who Called: patient         Does the patient know what this is regarding?: Requesting a call back from a missed call ;please advise           Would the patient rather a call back or a response via [employer]?call           Best Call Back Number: [phone]             Additional Information:

## 2025-06-13 NOTE — TELEPHONE ENCOUNTER
Attempted to contact patient, no answer, left detailed message to call back office.  Provided phone number for returned call.  [facility].  Labs completed yesterday.

## 2025-06-17 ENCOUNTER — TELEPHONE (OUTPATIENT)
Dept: ADMINISTRATIVE | Facility: CLINIC | Age: 80
End: 2025-06-17
Payer: MEDICARE

## 2025-06-17 ENCOUNTER — HOSPITAL ENCOUNTER (OUTPATIENT)
Facility: HOSPITAL | Age: 80
Discharge: HOME | End: 2025-06-17
Attending: INTERNAL MEDICINE | Admitting: INTERNAL MEDICINE
Payer: MEDICARE

## 2025-06-17 VITALS
RESPIRATION RATE: 16 BRPM | DIASTOLIC BLOOD PRESSURE: 66 MMHG | OXYGEN SATURATION: 92 % | HEART RATE: 57 BPM | WEIGHT: 210 LBS | TEMPERATURE: 98 F | SYSTOLIC BLOOD PRESSURE: 147 MMHG | BODY MASS INDEX: 27.83 KG/M2 | HEIGHT: 73 IN

## 2025-06-17 DIAGNOSIS — I35.0 AORTIC VALVE STENOSIS, ETIOLOGY OF CARDIAC VALVE DISEASE UNSPECIFIED: ICD-10-CM

## 2025-06-17 DIAGNOSIS — Z01.818 PREOPERATIVE TESTING: ICD-10-CM

## 2025-06-17 DIAGNOSIS — I25.118 ATHEROSCLEROSIS OF NATIVE CORONARY ARTERY OF NATIVE HEART WITH STABLE ANGINA PECTORIS: ICD-10-CM

## 2025-06-17 LAB
INDIRECT COOMBS: NORMAL
OHS QRS DURATION: 94 MS
OHS QRS DURATION: 96 MS
OHS QTC CALCULATION: 419 MS
OHS QTC CALCULATION: 448 MS
RH BLD: NORMAL
SPECIMEN OUTDATE: NORMAL

## 2025-06-17 PROCEDURE — C1724 CATH, TRANS ATHEREC,ROTATION: HCPCS | Performed by: INTERNAL MEDICINE

## 2025-06-17 PROCEDURE — 99152 MOD SED SAME PHYS/QHP 5/>YRS: CPT | Performed by: INTERNAL MEDICINE

## 2025-06-17 PROCEDURE — C1887 CATHETER, GUIDING: HCPCS | Performed by: INTERNAL MEDICINE

## 2025-06-17 PROCEDURE — C1760 CLOSURE DEV, VASC: HCPCS | Performed by: INTERNAL MEDICINE

## 2025-06-17 PROCEDURE — 25000003 PHARM REV CODE 250: Performed by: INTERNAL MEDICINE

## 2025-06-17 PROCEDURE — C1769 GUIDE WIRE: HCPCS | Performed by: INTERNAL MEDICINE

## 2025-06-17 PROCEDURE — 25500020 PHARM REV CODE 255: Performed by: INTERNAL MEDICINE

## 2025-06-17 PROCEDURE — 99152 MOD SED SAME PHYS/QHP 5/>YRS: CPT | Mod: ,,, | Performed by: INTERNAL MEDICINE

## 2025-06-17 PROCEDURE — C1874 STENT, COATED/COV W/DEL SYS: HCPCS | Performed by: INTERNAL MEDICINE

## 2025-06-17 PROCEDURE — 86901 BLOOD TYPING SEROLOGIC RH(D): CPT | Performed by: INTERNAL MEDICINE

## 2025-06-17 PROCEDURE — 85347 COAGULATION TIME ACTIVATED: CPT | Performed by: INTERNAL MEDICINE

## 2025-06-17 PROCEDURE — C1894 INTRO/SHEATH, NON-LASER: HCPCS | Performed by: INTERNAL MEDICINE

## 2025-06-17 PROCEDURE — 99153 MOD SED SAME PHYS/QHP EA: CPT | Performed by: INTERNAL MEDICINE

## 2025-06-17 PROCEDURE — C1725 CATH, TRANSLUMIN NON-LASER: HCPCS | Performed by: INTERNAL MEDICINE

## 2025-06-17 PROCEDURE — 27201423 OPTIME MED/SURG SUP & DEVICES STERILE SUPPLY: Performed by: INTERNAL MEDICINE

## 2025-06-17 PROCEDURE — 92943 PRQ TRLUML REVSC CH OCC ANT: CPT | Mod: LD,,, | Performed by: INTERNAL MEDICINE

## 2025-06-17 PROCEDURE — 93005 ELECTROCARDIOGRAM TRACING: CPT

## 2025-06-17 PROCEDURE — 63600175 PHARM REV CODE 636 W HCPCS: Performed by: INTERNAL MEDICINE

## 2025-06-17 PROCEDURE — 93010 ELECTROCARDIOGRAM REPORT: CPT | Mod: ,,, | Performed by: INTERNAL MEDICINE

## 2025-06-17 PROCEDURE — C9607 PERC D-E COR REVASC CHRO SIN: HCPCS | Mod: LD | Performed by: INTERNAL MEDICINE

## 2025-06-17 DEVICE — EVEROLIMUS-ELUTING PLATINUM CHROMIUM CORONARY STENT SYSTEM
Type: IMPLANTABLE DEVICE | Site: CORONARY | Status: FUNCTIONAL
Brand: SYNERGY™ XD

## 2025-06-17 RX ORDER — IBUPROFEN 200 MG
800 TABLET ORAL EVERY 6 HOURS PRN
COMMUNITY

## 2025-06-17 RX ORDER — ACETAMINOPHEN 325 MG/1
650 TABLET ORAL EVERY 4 HOURS PRN
Status: DISCONTINUED | OUTPATIENT
Start: 2025-06-17 | End: 2025-06-17 | Stop reason: HOSPADM

## 2025-06-17 RX ORDER — LIDOCAINE HYDROCHLORIDE 20 MG/ML
INJECTION, SOLUTION INFILTRATION; PERINEURAL
Status: DISCONTINUED | OUTPATIENT
Start: 2025-06-17 | End: 2025-06-17 | Stop reason: HOSPADM

## 2025-06-17 RX ORDER — CLOPIDOGREL BISULFATE 75 MG/1
75 TABLET ORAL DAILY
Qty: 90 TABLET | Refills: 3 | Status: SHIPPED | OUTPATIENT
Start: 2025-06-17 | End: 2026-06-17

## 2025-06-17 RX ORDER — ASPIRIN 325 MG
325 TABLET ORAL ONCE
Status: COMPLETED | OUTPATIENT
Start: 2025-06-17 | End: 2025-06-17

## 2025-06-17 RX ORDER — HEPARIN SOD,PORCINE/0.9 % NACL 1000/500ML
INTRAVENOUS SOLUTION INTRAVENOUS
Status: DISCONTINUED | OUTPATIENT
Start: 2025-06-17 | End: 2025-06-17 | Stop reason: HOSPADM

## 2025-06-17 RX ORDER — CLOPIDOGREL BISULFATE 300 MG/1
600 TABLET, FILM COATED ORAL ONCE
Status: COMPLETED | OUTPATIENT
Start: 2025-06-17 | End: 2025-06-17

## 2025-06-17 RX ORDER — FENTANYL CITRATE 50 UG/ML
INJECTION, SOLUTION INTRAMUSCULAR; INTRAVENOUS
Status: DISCONTINUED | OUTPATIENT
Start: 2025-06-17 | End: 2025-06-17 | Stop reason: HOSPADM

## 2025-06-17 RX ORDER — HEPARIN SODIUM 1000 [USP'U]/ML
INJECTION, SOLUTION INTRAVENOUS; SUBCUTANEOUS
Status: DISCONTINUED | OUTPATIENT
Start: 2025-06-17 | End: 2025-06-17 | Stop reason: HOSPADM

## 2025-06-17 RX ORDER — ASPIRIN 81 MG/1
81 TABLET ORAL DAILY
Qty: 90 TABLET | Refills: 3 | Status: SHIPPED | OUTPATIENT
Start: 2025-06-17 | End: 2026-06-17

## 2025-06-17 RX ORDER — SODIUM CHLORIDE 9 MG/ML
INJECTION, SOLUTION INTRAVENOUS CONTINUOUS
Status: DISCONTINUED | OUTPATIENT
Start: 2025-06-17 | End: 2025-06-17 | Stop reason: HOSPADM

## 2025-06-17 RX ORDER — ONDANSETRON 8 MG/1
8 TABLET, ORALLY DISINTEGRATING ORAL EVERY 8 HOURS PRN
Status: DISCONTINUED | OUTPATIENT
Start: 2025-06-17 | End: 2025-06-17 | Stop reason: HOSPADM

## 2025-06-17 RX ORDER — MIDAZOLAM HYDROCHLORIDE 1 MG/ML
INJECTION, SOLUTION INTRAMUSCULAR; INTRAVENOUS
Status: DISCONTINUED | OUTPATIENT
Start: 2025-06-17 | End: 2025-06-17 | Stop reason: HOSPADM

## 2025-06-17 RX ORDER — CEFAZOLIN SODIUM 1 G/3ML
INJECTION, POWDER, FOR SOLUTION INTRAMUSCULAR; INTRAVENOUS
Status: DISCONTINUED | OUTPATIENT
Start: 2025-06-17 | End: 2025-06-17 | Stop reason: HOSPADM

## 2025-06-17 RX ORDER — DIPHENHYDRAMINE HCL 50 MG
50 CAPSULE ORAL ONCE
Status: COMPLETED | OUTPATIENT
Start: 2025-06-17 | End: 2025-06-17

## 2025-06-17 RX ADMIN — CLOPIDOGREL BISULFATE 600 MG: 300 TABLET, FILM COATED ORAL at 08:06

## 2025-06-17 RX ADMIN — DIPHENHYDRAMINE HYDROCHLORIDE 50 MG: 50 CAPSULE ORAL at 08:06

## 2025-06-17 RX ADMIN — ASPIRIN 325 MG ORAL TABLET 325 MG: 325 PILL ORAL at 08:06

## 2025-06-17 NOTE — DISCHARGE INSTRUCTIONS
Instructions     1. Do not strain or lift anything greater than 5 lb for 1 week.   2. Do not drive or operate any dangerous machinery for 24 hours.   3. Keep the dressing on, clean, and dry for 24 hours.   4. After 24 hours, the dressing may be removed and a shower is allowed.   5. Clean the area with mild soap and water and then cover it with a bandage.   6. Once the skin has healed, bathing in a tub or swimming is allowed.   7. Inspect the groin site daily and report to the physician any swelling at the site that   cannot be controlled with manual pressure for 10 minutes, unusual pain at the   access site or affected extremity, unusual swelling at the access site, or signs or   symptoms of infection such as redness, pain, or fever.   Call 911 if you have:   Bleeding from the puncture site that you cannot stop by doing the following:   Relax and lie down right away. Keep your leg flat and apply firm pressure to the site using your fingers and a gauze pad. Keep the pressure on for 20 minutes. Continue this until the bleeding stops. This may take awhile. When bleeding stops, cover the site with a sterile bandage and keep your leg still as much as possible.  Instructions     1. Do not strain or lift anything greater than 5 lb for 1 week.   2. Do not drive or operate any dangerous machinery for 24 hours.   3. Keep the dressing on, clean, and dry for 24 hours.   4. After 24 hours, the dressing may be removed and a shower is allowed.   5. Clean the area with mild soap and water and then cover it with a bandage.   6. Once the skin has healed, bathing in a tub or swimming is allowed.   7. Inspect the groin site daily and report to the physician any swelling at the site that   cannot be controlled with manual pressure for 10 minutes, unusual pain at the   access site or affected extremity, unusual swelling at the access site, or signs or   symptoms of infection such as redness, pain, or fever.   Call 911 if you have:    Bleeding from the puncture site that you cannot stop by doing the following:   Relax and lie down right away. Keep your leg flat and apply firm pressure to the site using your fingers and a gauze pad. Keep the pressure on for 20 minutes. Continue this until the bleeding stops. This may take awhile. When bleeding stops, cover the site with a sterile bandage and keep your leg still as much as possible.

## 2025-06-17 NOTE — NURSING
Pt transferred from cath lab via stretcher.  Vss.  Post op orders and assessment initiated.  Pt aao, tolerating po.  Family called to bs.  Pt in no acute distress and verbalizes no complaints. Call bell within reach.

## 2025-06-17 NOTE — NURSING
Patient discharged per MD orders. Instructions given on medications, wound care, activity, signs of infection, when to call MD, and follow up appointments. Bedside delivery prescriptions rec'd x2.  Pt verbalized understanding.  Patient AAOx3, VSS, no c/o pain or discomfort at this time. Telemetry and PIV removed. Patient left unit via wheelchair with PCT to meet family who will drive him home.

## 2025-06-17 NOTE — HOSPITAL COURSE
Mr. Dorantes is s/p PCI to LAD with rotablation. He is doing well and is discharging in stable condition. DAPT script filled here prior to leaving the building. He will follow up with Dr. Ho in clinic for further TAVR work up.

## 2025-06-17 NOTE — TELEPHONE ENCOUNTER
Mr. Saunders appears to be taking simvastatin 80 mg hs. LDL goal < 70 mg/dL met.    ----- Message from KADY Negrete sent at 2025  7:42 AM CDT -----  Regarding: Order for JENNIFER SAUNDERS    Patient Name: JENNIFER SAUNDERS([id number])  Sex: Male  : 1945      PCP: KAVITHA CUEVAS    Center: [facility] CENTRAL BILLING OFFICE     Types of orders made on 2025: Activity, Diet, ECG, IV, Medications,                                       Nursing, Transfer    Order Date:2025  Ordering User:KASEY SCHAFFER [[id number]]  Attending Provider:Tyson Ho III, MD [[id number]]  Authorizing Provider: Tyson Ho III, MD [[id number]]  Department:[facility] SHORT STAY CARDIAC UNIT[[id number]]    Order Specific Information  Order: Notify [facility] [Custom: [id number]]  Order #: [id number]: 1    Priority: Routine  Class: Hospital Performed    Associated Diagnoses      I25.118 Atherosclerosis of native coronary artery of native heart with       stable angina pectoris      I35.0 Aortic valve stenosis, etiology of cardiac valve disease unspecified    Released on: 2025  7:42 AM        Priority: Routine  Class: Hospital Performed    Associated Diagnoses      I25.118 Atherosclerosis of native coronary artery of native heart with       stable angina pectoris      I35.0 Aortic valve stenosis, etiology of cardiac valve disease unspecified    Released on: 2025  7:42 AM

## 2025-06-17 NOTE — BRIEF OP NOTE
Brief Operative Note:    : Tyson Ho III, MD     Referring Physician: Tyson Ho III     All Operators: Surgeon(s):  Tyson Ho III, MD Gillies, DO Kyree Banuelos James S., MD  , MD Miguel A     Preoperative Diagnosis: Atherosclerosis of native coronary artery of native heart with stable angina pectoris [I25.118]  Aortic valve stenosis, etiology of cardiac valve disease unspecified [I35.0]     Postop Diagnosis: Atherosclerosis of native coronary artery of native heart with stable angina pectoris [I25.118]  Aortic valve stenosis, etiology of cardiac valve disease unspecified [I35.0]    Treatments/Procedures: Procedure(s) (LRB):  Stent, Drug Eluting, Single Vessel, Coronary (N/A)  Repair, Chronic Total Occlusion, Coronary (N/A)  Atherectomy-coronary (N/A)    Findings: Pt is s/p PCI to LAD with rotablation. See catheterization report for full details.    Estimated Blood loss: 20 cc    Specimens removed: No    Recommendations:   - Routine post-cath care as per orders  - IVF at 150cc/hr for 4 hrs  - Cardiac rehab referral after TAVR  - ASA, Plavix for 1 year and ASA indefinitely  - Follow up with Dr. Ho for further TAVR evaluation      Connor M Gillies

## 2025-06-17 NOTE — DISCHARGE SUMMARY
[facility] - Cath Lab  Interventional Cardiology  Discharge Summary      Patient Name: Av Dorantes  MRN: [id number]  Admission Date: 6/17/2025  Hospital Length of Stay: 0 days  Discharge Date and Time: 06/17/2025 11:39 AM  Attending Physician: Tyson Ho III, MD  Discharging Provider: Connor M Gillies, DO  Primary Care Physician: KAVITHA Larry MD    HPI:  CHIEF COMPLAINT:  Patient presents today with dyspnea and decreased stamina while walking.     CARDIOPULMONARY:  He reports dyspnea and decreased stamina that began in January during a trip to [place] while walking through the airport. He experiences near-syncope with postural changes. He denies chest pain, angina, and syncope.    He is here today for PCI to LAD .    Procedure(s) (LRB):  Stent, Drug Eluting, Single Vessel, Coronary (N/A)  Repair, Chronic Total Occlusion, Coronary (N/A)  Atherectomy-coronary (N/A)     Indwelling Lines/Drains at time of discharge:  Lines/Drains/Airways       None                   Hospital Course:  Mr. Dorantes is s/p PCI to LAD with rotablation. He is doing well and is discharging in stable condition. DAPT script filled here prior to leaving the building. He will follow up with Dr. Ho in clinic for further TAVR work up.    Goals of Care Treatment Preferences:             Significant Diagnostic Studies: N/A    Pending Diagnostic Studies:       None          No new Assessment & Plan notes have been filed under this hospital service since the last note was generated.  Service: Interventional Cardiology      Discharged Condition: stable    Follow Up:    Patient Instructions:   No discharge procedures on file.  Medications:  Reconciled Home Medications:      Medication List        START taking these medications      aspirin 81 MG EC tablet  Commonly known as: ECOTRIN  Take 1 tablet (81 mg total) by mouth once daily.     clopidogreL 75 mg tablet  Commonly known as: PLAVIX  Take 1 tablet (75 mg total) by mouth once daily.             CONTINUE taking these medications      cholecalciferol (vitamin D3) 25 mcg (1,000 unit) capsule  Commonly known as: VITAMIN D3  Take 1,000 Units by mouth once daily.     colchicine 0.6 mg Cap  Commonly known as: MITIGARE  Take 1 capsule (0.6 mg total) by mouth once daily.     febuxostat 80 mg Tab  Commonly known as: ULORIC  TAKE 1 TABLET DAILY     ibuprofen 200 MG tablet  Commonly known as: ADVIL,MOTRIN  Take 800 mg by mouth every 6 (six) hours as needed for Pain.     irbesartan 300 MG tablet  Commonly known as: AVAPRO  TAKE 1 TABLET BY MOUTH EVERY DAY     melatonin 5 mg Cap  Take 1 capsule by mouth nightly as needed (insomnia).     oxyCODONE-acetaminophen 7.5-325 mg per tablet  Commonly known as: PERCOCET  Take by mouth.     simvastatin 80 MG tablet  Commonly known as: ZOCOR  Take 1 tablet (80 mg total) by mouth every evening.            STOP taking these medications      celecoxib 200 MG capsule  Commonly known as: CeleBREX              Time spent on the discharge of patient: 25 minutes    Connor M Gillies, DO  Interventional Cardiology  [facility] - Cath Lab

## 2025-06-17 NOTE — NURSING
Pt prepped in room 2 on SSCU. Pt is AAOx4 and follows commands appropriately. VS taken and wnl and pt is free from s/s of pain/distress. IVs started, labs drawn, 12 lead EKG completed, sites clipped and preop questions completed. Consents completed and verified. Safety measures in place. Pt's wife is at bedside.

## 2025-06-18 LAB
POC ACTIVATED CLOTTING TIME K: 389 SEC (ref 74–137)
SAMPLE: ABNORMAL

## 2025-07-03 ENCOUNTER — OFFICE VISIT (OUTPATIENT)
Dept: CARDIOLOGY | Facility: CLINIC | Age: 80
End: 2025-07-03
Payer: MEDICARE

## 2025-07-03 ENCOUNTER — TELEPHONE (OUTPATIENT)
Dept: CARDIOLOGY | Facility: CLINIC | Age: 80
End: 2025-07-03
Payer: MEDICARE

## 2025-07-03 VITALS
SYSTOLIC BLOOD PRESSURE: 112 MMHG | DIASTOLIC BLOOD PRESSURE: 56 MMHG | HEART RATE: 72 BPM | BODY MASS INDEX: 28.25 KG/M2 | WEIGHT: 213.19 LBS | HEIGHT: 73 IN | OXYGEN SATURATION: 97 %

## 2025-07-03 DIAGNOSIS — I25.118 ATHEROSCLEROSIS OF NATIVE CORONARY ARTERY OF NATIVE HEART WITH STABLE ANGINA PECTORIS: ICD-10-CM

## 2025-07-03 DIAGNOSIS — I35.0 NONRHEUMATIC AORTIC VALVE STENOSIS: Primary | ICD-10-CM

## 2025-07-03 DIAGNOSIS — I35.0 AORTIC VALVE STENOSIS, ETIOLOGY OF CARDIAC VALVE DISEASE UNSPECIFIED: Primary | ICD-10-CM

## 2025-07-03 PROCEDURE — 99214 OFFICE O/P EST MOD 30 MIN: CPT | Mod: PBBFAC | Performed by: INTERNAL MEDICINE

## 2025-07-03 PROCEDURE — 99999 PR PBB SHADOW E&M-EST. PATIENT-LVL IV: CPT | Mod: PBBFAC,,, | Performed by: INTERNAL MEDICINE

## 2025-07-03 PROCEDURE — 99214 OFFICE O/P EST MOD 30 MIN: CPT | Mod: S$PBB,,, | Performed by: INTERNAL MEDICINE

## 2025-07-03 NOTE — ASSESSMENT & PLAN NOTE
Recent PCI to mid LAD    Plan-  -continue DAPT with aspirin and plavix  -continue statin, goal LDL <70.

## 2025-07-03 NOTE — PROGRESS NOTES
Interventional Cardiology Clinic Note  Reason for Visit: Follow up    HPI:   Mr Dorantes is a 79yr old male with past medical history of mod-sev aortic stenosis, CAD s/p PCI to mid-LAD (6/17/25), gout presents to the clinic for follow up. Patient was being followed for aortic stenosis and underwent a LHC on 6/4 that revealed  of LAD, underwent PCI to mid LAD on 6/13. Today, states he still has significant SOB with exertion along with chest discomfort. Denies chest pain, orthopnea, PND, lightheadedness, dizziness, syncope.  Last echo on 5/19/25 with midly reduced EF 45-50% with AV area 0.9 cm2, velocity 2.7m/s and gradient 15mm Hg.       Echo 5/2025      Left Ventricle: The left ventricle is normal in size. Normal wall thickness. Global hypokinesis present. There is mildly reduced systolic function with a visually estimated ejection fraction of 45 - 50%.  measuring -12.3% Grade I diastolic dysfunction.    Right Ventricle: The right ventricle is normal in size Wall thickness is normal. Systolic function is normal.    Aortic Valve: There is moderate to severe stenosis. Aortic valve peak velocity is 2.7 m/s. Mean gradient is 15 mmHg. The dimensionless index is 0.27.    Tricuspid Valve: There is mild regurgitation.      ROS:    Constitution: Negative for fever, chills, weight loss or gain.   HENT: Negative for sore throat, rhinorrhea, or headache.  Eyes: Negative for blurred or double vision.   Cardiovascular: See above  Pulmonary: MARTIN  Gastrointestinal: Negative for abdominal pain, nausea, vomiting, or diarrhea.   : Negative for dysuria.   Neurological: Negative for focal weakness or sensory changes.  PMH:     Past Medical History:   Diagnosis Date    Abnormal stress echo 10/12/2015    9/15/2015: Stress Echo: 6:00 min. No CP. ECG negative but frequent VPCs. Echo negative.     Aortic valve stenosis     Family history of premature CAD 01/22/2013    Gout, unspecified 01/22/2013    High cholesterol 01/22/2013     "Hypertension, benign 2013    1995: Diagnosed.     Normal cardiac stress test 2013    Ureteral stone 2013     Past Surgical History:   Procedure Laterality Date    ADENOIDECTOMY      ATHERECTOMY, CORONARY N/A 2025    Procedure: Atherectomy-coronary;  Surgeon: Tyson Ho III, MD;  Location: [facility] CATH LAB;  Service: Cardiology;  Laterality: N/A;    CORONARY ANGIOGRAPHY N/A 2025    Procedure: ANGIOGRAM, CORONARY ARTERY;  Surgeon: Alberto Montes MD;  Location: [facility] CATH LAB;  Service: Cardiology;  Laterality: N/A;  right radial    REPAIR, CHRONIC TOTAL OCCLUSION, CORONARY N/A 2025    Procedure: Repair, Chronic Total Occlusion, Coronary;  Surgeon: Tyson Ho III, MD;  Location: [facility] CATH LAB;  Service: Cardiology;  Laterality: N/A;    STENT, DRUG ELUTING, SINGLE VESSEL, CORONARY N/A 2025    Procedure: Stent, Drug Eluting, Single Vessel, Coronary;  Surgeon: Tyson Ho III, MD;  Location: [facility] CATH LAB;  Service: Cardiology;  Laterality: N/A;    TONSILLECTOMY       Allergies:   Review of patient's allergies indicates:  No Known Allergies  Medications:   Medications Ordered Prior to Encounter[1]  Social History:     Social History     Tobacco Use    Smoking status: Former     Current packs/day: 0.00     Average packs/day: 2.0 packs/day for 30.0 years (60.0 ttl pk-yrs)     Types: Cigarettes     Start date: 1962     Quit date: 1992     Years since quittin.5    Smokeless tobacco: Never   Substance Use Topics    Alcohol use: Yes     Alcohol/week: 15.0 standard drinks of alcohol     Types: 15 Shots of liquor per week     Comment: 1 drink a day.     Family History:     Family History   Problem Relation Name Age of Onset    Cancer Mother      Heart disease Father  51        MI    Diabetes Paternal Grandfather       Physical Exam:   BP (!) 112/56 (BP Location: Left arm, Patient Position: Sitting)   Pulse 72   Ht 6' 1" (1.854 m)   Wt 96.7 kg (213 lb 3 oz)   " SpO2 97%   BMI 28.13 kg/m²    Wt Readings from Last 4 Encounters:   07/03/25 96.7 kg (213 lb 3 oz)   06/17/25 95.3 kg (210 lb)   06/12/25 98 kg (216 lb 0.8 oz)   05/22/25 97.9 kg (215 lb 12.8 oz)   Vitals reviewed.    Constitutional: No distress, obese, conversant  HEENT: Sclera anicteric, PERRLA  Neck: No JVD, no masses, good movement  CV: RRR, S1 and S2 normal, systolic murmur present over aortic area. Pulses 2+ and equal bilaterally in radial arteries and femoral, DP and PT areas bilaterally  Pulm: Clear to auscultation bilaterally with symmetrical expansion.   GI: Abdomen soft, non-tender, good bowel sounds  Extremities: Both extremities intact and grossly normal, skin is warm, no edema noted  Skin: No ecchymosis, erythema, or ulcers  Psych: AOx3, appropriate affect  Neuro: CNII-XII intact, no focal deficits      Labs:     Lab Results   Component Value Date     06/12/2025     09/17/2024    K 4.1 06/12/2025    K 4.6 09/17/2024     06/12/2025     09/17/2024    CO2 24 06/12/2025    CO2 22 (L) 09/17/2024    BUN 21 06/12/2025    CREATININE 0.8 06/12/2025    ANIONGAP 6 (L) 06/12/2025     Lab Results   Component Value Date    HGBA1C 5.5 09/17/2024     Lab Results   Component Value Date     (H) 09/12/2023    BNP 91 07/25/2023    Lab Results   Component Value Date    WBC 7.62 06/12/2025    HGB 13.4 (L) 06/12/2025    HGB 13.7 (L) 09/17/2024    HCT 41.1 06/12/2025    HCT 41.3 09/17/2024     06/12/2025     (L) 09/17/2024    GRAN 4.8 09/17/2024    GRAN 67.8 09/17/2024     Lab Results   Component Value Date    CHOL 156 09/17/2024    HDL 79 (H) 09/17/2024    LDLCALC 66.4 09/17/2024    TRIG 53 09/17/2024          Imaging:        EF   Date Value Ref Range Status   07/17/2023 50 % Final   07/18/2022 50 % Final       EKG   Results for orders placed or performed during the hospital encounter of 06/17/25   EKG 12-lead    Collection Time: 06/17/25  2:18 PM   Result Value Ref Range    QRS  Duration 96 ms    [facility] QTC Calculation 419 ms    Narrative    Test Reason :    Vent. Rate :  54 BPM     Atrial Rate :  54 BPM     P-R Int : 258 ms          QRS Dur :  96 ms      QT Int : 442 ms       P-R-T Axes :  17 -51  48 degrees    QTcB Int : 419 ms    Sinus bradycardia with 1st degree A-V block  Left axis deviation  Anteroseptal infarct (cited on or before 16-Jun-2022)  Abnormal ECG  When compared with ECG of 17-Jun-2025 07:57,  No significant change was found  Confirmed by Edda Lake ([id number]) on 6/17/2025 2:36:56 PM    Referred By: TYSON JOY           Confirmed By: Edda Lake           Coronary Angiography 6/4/25  Chronic total occlusion of the mid left anterior descending artery with left-to-left and right-to-left collaterals.   6/17/25  The Mid LAD lesion was 100% stenosed with 0% stenosis post-intervention.       Assessment and Plan:     Nonrheumatic aortic valve stenosis  Last echo consistent with low flow, low gradient severe AS  Patient remains symptomatic with class III MARTIN despite LAD intervention.    Plan-  - TAVR workup:  -- DSE to confirm LFLGSAS  -- TAVR CTA  -- Dr. Arrington    Atherosclerosis of native coronary artery of native heart with stable angina pectoris  Recent PCI to mid LAD    Plan-  -continue DAPT with aspirin and plavix  -continue statin, goal LDL <70.       Case discussed with Dr. Joy.     Signed:  Dilcia Chacon M.D.  Cardiology Fellow  [facility]  7/3/2025 2:50 PM     Staff:    I have personally interviewed and examined the patient and concur with the fellow's history, physical, assessment, and plan.    Feeling a little, but not much, better since LAD PCI.  Moving forward with TAVR workup.    Tyson Joy MD  Cardiology          [1]   Current Outpatient Medications on File Prior to Visit   Medication Sig Dispense Refill    aspirin (ECOTRIN) 81 MG EC tablet Take 1 tablet (81 mg total) by mouth once daily. 90 tablet 3    cholecalciferol, vitamin D3, (VITAMIN D3) 25  mcg (1,000 unit) capsule Take 1,000 Units by mouth once daily.      clopidogreL (PLAVIX) 75 mg tablet Take 1 tablet (75 mg total) by mouth once daily. 90 tablet 3    colchicine (MITIGARE) 0.6 mg Cap Take 1 capsule (0.6 mg total) by mouth once daily. 30 capsule 5    febuxostat (ULORIC) 80 mg Tab TAKE 1 TABLET DAILY 90 tablet 3    ibuprofen (ADVIL,MOTRIN) 200 MG tablet Take 800 mg by mouth every 6 (six) hours as needed for Pain.      irbesartan (AVAPRO) 300 MG tablet TAKE 1 TABLET BY MOUTH EVERY DAY 90 tablet 1    melatonin 5 mg Cap Take 1 capsule by mouth nightly as needed (insomnia).      oxyCODONE-acetaminophen (PERCOCET) 7.5-325 mg per tablet Take by mouth.      simvastatin (ZOCOR) 80 MG tablet Take 1 tablet (80 mg total) by mouth every evening. 90 tablet 3     Current Facility-Administered Medications on File Prior to Visit   Medication Dose Route Frequency Provider Last Rate Last Admin    sodium chloride 0.9% flush 10 mL  10 mL Intravenous PRN Alberto Montes MD

## 2025-07-03 NOTE — ASSESSMENT & PLAN NOTE
Echo with evidence of severe AS.  Patient symptomatic despite LAD intervention.    Plan-  -will proceed with TAVR workup, will set up TAVR CT.  -refer to CTS for evaluation.

## 2025-07-03 NOTE — TELEPHONE ENCOUNTER
Confirmed appointments completed on patient behalf.  Appt letters to be sent to patient.  Review of pre procedure instructions with patient understanding.

## 2025-07-07 ENCOUNTER — EDUCATION (OUTPATIENT)
Dept: CARDIOLOGY | Facility: CLINIC | Age: 80
End: 2025-07-07
Payer: MEDICARE

## 2025-07-07 DIAGNOSIS — I35.0 AORTIC VALVE STENOSIS, ETIOLOGY OF CARDIAC VALVE DISEASE UNSPECIFIED: Primary | ICD-10-CM

## 2025-07-07 NOTE — PROGRESS NOTES
TRANSCATHETER AORTIC VALVE REPLACEMENT    For questions, concerns or rescheduling issues please call our office at [phone]    TENTATIVE DATE  ---- NOT OFFICIAL DATE      Your procedure has been scheduled on Wednesday August 5, 2025.     Please report to the Cardiology Waiting Area on the Third floor of the hospital at 10:00 AM.   Please be aware that this is not the time of your procedure but the time you are to arrive.     Preperations for your procedure:  Shower with Dial soap the night before and the morning of your procedure. Be sure to clean your groin area well.  Nothing to eat or drink after MIDNIGHT the night before.                                            Bring your cane and/or walker with you to the hospital if you routinely use one of these devices.  Bring CPAP/BiPAP, or oxygen if you are on oxygen at home.  Leave any jewelry and/or valuables at home  Remove all make-up and nail polish   You may wear your dentures, however, Anesthesia may require you to remove them.  Please bring case to keep them in if necessary.    Medications:    You may take your regular scheduled medications the morning of your procedure unless specified below:    DO NOT STOP ASPIRIN,  PLAVIX  FOR PROCEDURE.  Continue to take daily.    If you are diabetic and on Metformin (Glucophage), do not take it the day before, the day of, and 2 days after your procedure.    Hold  VITAMINS on the morning  of your procedure.      If you take a weekly GLP-1 Inhibitor  You must be off that medication for one full week prior to your procedure.  Anesthesia will cancel your procedure if you do not hold this medication for a minimum of 7 days prior to procedure.       How long will the procedure take?  The procedure takes approximately three to four hours.  After the procedure, you will go to an ICU or the Cardiac Step Down Unit.  The decision will be made by the valve team.  You will be monitored closely for the next several hours and remain  overnight.       When can I go home?  Your length of stay in the hospital, will be determined by your physician.  Be prepared to stay 1-3 days.  You will be discharged when your physician thinks it is safe for you to go home.  You must have someone to drive you home when you are discharged. It is strongly advised that you have a responsible adult to stay with you for the first 24 hours after discharge.       Follow Up After Valve Replacement:  You will be scheduled for follow up in one month and one year with an echo, lab work, and a clinic visit.    If you are in a research trial, your follow up will be slightly different and according to the trial requirements.  You can follow up with your regular cardiologist regarding any other heart issues.      Call the office for any signs of infection ( fever, cough, pneumonia, urinary tract, etc.) We cannot implant a device in an infected patient.    DO NOT SCHEDULE ANY ELECTIVE PROCEDURES FOR 6 MONTHS AFTER TAVR.  THIS INCLUDES DENTAL WORK, COLONOSCOPY,ETC.           Directions for Reporting to Cardiology Waiting Area in the Hospital    If you park in the Parking Garage:  Take elevators to the1st floor of the parking garage.  Continue past the gift shop, coffee shop, and piano.  Take Elevator B (gold elevators) to 3rd floor and follow signs to  Cath Lab Registration/EP Lab Registration.  Follow the long hallway all the way around until you come to the waiting area.  Check in at Reception desk.    OR    If family is dropping you off:  Have them drop you off at the front of the Hospital under the green overhang.  Enter through the doors and take a right.  Take the E elevators to the 3rd floor Cardiology Waiting Area.  Check in at the Reception Desk in the waiting room.    Wheelchairs are available on the first floor of the parking garage.                     Insurance Authorization/Personal Assistance    The [facility] pre-service team will submit information to your  "insurance carrier for authorization.  Please note that we do not handle any insurance issues in our office.  If there are any financial obligations, such as co-pays, deductibles, or out of pocket fees, you will be contacted by a representative prior to your procedure.  It is the patient's responsibility to assure that their procedure is cleared in advance.  Our expert financial counselors are available to provide  patients with assistance for personalized cost estimates.  Financial counselors can be reached the following ways:    Pre Service Representatives:  [phone]  Financial Counselors:  [phone]  ITS KOOLt messaging - accessed via the patient portal  Live chat accessed through [email or web address]      FMLA/Disability Forms    ALL FMLA/Disability forms and claims are  processed through the Disability office.  All claims must be initiated through this office.  Once the forms are completed, they will be sent to the physician for signature.  Please allow 10 - 14 working days to have forms completed and returned to you.   You may reach this office in  the following ways:      Email:  [email or web address]  Fax:  [phone]  Phone:  [phone]    Release of Information and Medical Records      Our office will send a copy of any office notes or procedure notes to your referring provider.   ALL other medical records are released through the Medical Records office.  Phone:  [phone]  Fax:  [phone]      Miscellaneous Information    On-line information:  [email or web address]    Parking at [facility] is free and open 24/7    [facility] parking is located on the first floor of the garage and is available for a small fee Monday - Friday from 6a - 6 pm    [facility] located at [facility]:  [phone], or [email or web address]    If you need assistance with setting up or troubleshooting "[employer]hart" please call [phone]                          "

## 2025-07-14 ENCOUNTER — LAB VISIT (OUTPATIENT)
Dept: LAB | Facility: HOSPITAL | Age: 80
End: 2025-07-14
Attending: INTERNAL MEDICINE
Payer: MEDICARE

## 2025-07-14 DIAGNOSIS — I35.0 AORTIC VALVE STENOSIS, ETIOLOGY OF CARDIAC VALVE DISEASE UNSPECIFIED: ICD-10-CM

## 2025-07-14 LAB
CREAT SERPL-MCNC: 0.8 MG/DL (ref 0.5–1.4)
GFR SERPLBLD CREATININE-BSD FMLA CKD-EPI: >60 ML/MIN/1.73/M2

## 2025-07-14 PROCEDURE — 36415 COLL VENOUS BLD VENIPUNCTURE: CPT

## 2025-07-14 PROCEDURE — 82565 ASSAY OF CREATININE: CPT

## 2025-07-18 ENCOUNTER — HOSPITAL ENCOUNTER (OUTPATIENT)
Dept: RADIOLOGY | Facility: HOSPITAL | Age: 80
Discharge: HOME | End: 2025-07-18
Attending: INTERNAL MEDICINE
Payer: MEDICARE

## 2025-07-18 DIAGNOSIS — I35.0 AORTIC VALVE STENOSIS, ETIOLOGY OF CARDIAC VALVE DISEASE UNSPECIFIED: ICD-10-CM

## 2025-07-18 PROCEDURE — 74174 CTA ABD&PLVS W/CONTRAST: CPT | Mod: TC

## 2025-07-18 PROCEDURE — 74174 CTA ABD&PLVS W/CONTRAST: CPT | Mod: 26,,, | Performed by: INTERNAL MEDICINE

## 2025-07-18 PROCEDURE — 71275 CT ANGIOGRAPHY CHEST: CPT | Mod: 26,,, | Performed by: INTERNAL MEDICINE

## 2025-07-18 PROCEDURE — 25500020 PHARM REV CODE 255: Performed by: INTERNAL MEDICINE

## 2025-07-18 RX ADMIN — IOHEXOL 120 ML: 350 INJECTION, SOLUTION INTRAVENOUS at 12:07

## 2025-07-21 ENCOUNTER — TELEPHONE (OUTPATIENT)
Dept: CARDIOLOGY | Facility: CLINIC | Age: 80
End: 2025-07-21
Payer: MEDICARE

## 2025-07-21 DIAGNOSIS — I35.0 NONRHEUMATIC AORTIC (VALVE) STENOSIS: Primary | ICD-10-CM

## 2025-07-21 RX ORDER — SODIUM CHLORIDE 9 MG/ML
INJECTION, SOLUTION INTRAVENOUS CONTINUOUS
OUTPATIENT
Start: 2025-07-21 | End: 2025-07-21

## 2025-07-21 RX ORDER — DIPHENHYDRAMINE HCL 50 MG
50 CAPSULE ORAL ONCE
OUTPATIENT
Start: 2025-07-21 | End: 2025-07-21

## 2025-07-22 ENCOUNTER — LAB VISIT (OUTPATIENT)
Dept: LAB | Facility: OTHER | Age: 80
End: 2025-07-22
Payer: MEDICARE

## 2025-07-22 ENCOUNTER — OFFICE VISIT (OUTPATIENT)
Dept: INTERNAL MEDICINE | Facility: CLINIC | Age: 80
End: 2025-07-22
Attending: INTERNAL MEDICINE
Payer: MEDICARE

## 2025-07-22 VITALS
SYSTOLIC BLOOD PRESSURE: 116 MMHG | BODY MASS INDEX: 28.63 KG/M2 | OXYGEN SATURATION: 93 % | DIASTOLIC BLOOD PRESSURE: 64 MMHG | WEIGHT: 216 LBS | HEART RATE: 80 BPM | HEIGHT: 73 IN

## 2025-07-22 DIAGNOSIS — D50.8 OTHER IRON DEFICIENCY ANEMIA: ICD-10-CM

## 2025-07-22 DIAGNOSIS — R06.02 SOB (SHORTNESS OF BREATH) ON EXERTION: ICD-10-CM

## 2025-07-22 DIAGNOSIS — R42 DIZZY SPELLS: ICD-10-CM

## 2025-07-22 DIAGNOSIS — R97.8 OTHER ABNORMAL TUMOR MARKERS: ICD-10-CM

## 2025-07-22 DIAGNOSIS — R59.0 RETROPERITONEAL LYMPHADENOPATHY: ICD-10-CM

## 2025-07-22 DIAGNOSIS — I25.118 ATHEROSCLEROSIS OF NATIVE CORONARY ARTERY OF NATIVE HEART WITH STABLE ANGINA PECTORIS: ICD-10-CM

## 2025-07-22 DIAGNOSIS — Z12.5 SCREENING FOR PROSTATE CANCER: ICD-10-CM

## 2025-07-22 DIAGNOSIS — E03.9 HYPOTHYROIDISM, UNSPECIFIED TYPE: ICD-10-CM

## 2025-07-22 DIAGNOSIS — E55.9 VITAMIN D DEFICIENCY: ICD-10-CM

## 2025-07-22 DIAGNOSIS — M1A.09X0 IDIOPATHIC CHRONIC GOUT OF MULTIPLE SITES WITHOUT TOPHUS: Primary | ICD-10-CM

## 2025-07-22 DIAGNOSIS — R79.89 OTHER SPECIFIED ABNORMAL FINDINGS OF BLOOD CHEMISTRY: ICD-10-CM

## 2025-07-22 DIAGNOSIS — E78.9 DISORDER OF LIPID METABOLISM: ICD-10-CM

## 2025-07-22 DIAGNOSIS — E78.00 HIGH CHOLESTEROL: ICD-10-CM

## 2025-07-22 DIAGNOSIS — I10 ESSENTIAL HYPERTENSION: ICD-10-CM

## 2025-07-22 DIAGNOSIS — R53.83 FATIGUE, UNSPECIFIED TYPE: ICD-10-CM

## 2025-07-22 DIAGNOSIS — R59.0 RETROPERITONEAL LYMPHADENOPATHY: Primary | ICD-10-CM

## 2025-07-22 DIAGNOSIS — K86.2 PANCREATIC CYST: ICD-10-CM

## 2025-07-22 DIAGNOSIS — D51.0 PERNICIOUS ANEMIA: ICD-10-CM

## 2025-07-22 DIAGNOSIS — I35.0 NONRHEUMATIC AORTIC VALVE STENOSIS: ICD-10-CM

## 2025-07-22 DIAGNOSIS — R59.0 LYMPHADENOPATHY, RETROPERITONEAL: ICD-10-CM

## 2025-07-22 DIAGNOSIS — I35.0 AORTIC VALVE STENOSIS, ETIOLOGY OF CARDIAC VALVE DISEASE UNSPECIFIED: ICD-10-CM

## 2025-07-22 DIAGNOSIS — J84.10 PULMONARY FIBROSIS DETERMINED BY HIGH RESOLUTION COMPUTED TOMOGRAPHY: ICD-10-CM

## 2025-07-22 DIAGNOSIS — I50.42 CHRONIC COMBINED SYSTOLIC AND DIASTOLIC HEART FAILURE: ICD-10-CM

## 2025-07-22 LAB
ABSOLUTE EOSINOPHIL (OHS): 0.11 K/UL
ABSOLUTE MONOCYTE (OHS): 0.82 K/UL (ref 0.3–1)
ABSOLUTE NEUTROPHIL COUNT (OHS): 5.65 K/UL (ref 1.8–7.7)
ALBUMIN SERPL BCP-MCNC: 3.9 G/DL (ref 3.5–5.2)
ALP SERPL-CCNC: 59 UNIT/L (ref 40–150)
ALT SERPL W/O P-5'-P-CCNC: 18 UNIT/L (ref 10–44)
ANION GAP (OHS): 11 MMOL/L (ref 8–16)
AST SERPL-CCNC: 20 UNIT/L (ref 11–45)
BASOPHILS # BLD AUTO: 0.05 K/UL
BASOPHILS NFR BLD AUTO: 0.7 %
BILIRUB SERPL-MCNC: 0.7 MG/DL (ref 0.1–1)
BUN SERPL-MCNC: 18 MG/DL (ref 8–23)
CALCIUM SERPL-MCNC: 9.3 MG/DL (ref 8.7–10.5)
CANCER AG19-9 SERPL-ACNC: 175.9 U/ML
CARCINOEMBRYONIC ANTIGEN (OHS): 4.3 NG/ML
CHLORIDE SERPL-SCNC: 109 MMOL/L (ref 95–110)
CHOLEST SERPL-MCNC: 165 MG/DL (ref 120–199)
CHOLEST/HDLC SERPL: 2.2 {RATIO} (ref 2–5)
CK SERPL-CCNC: 166 U/L (ref 20–200)
CO2 SERPL-SCNC: 20 MMOL/L (ref 23–29)
CREAT SERPL-MCNC: 1 MG/DL (ref 0.5–1.4)
CRP SERPL-MCNC: 1.1 MG/L
D DIMER PPP IA.FEU-MCNC: 1.7 MG/L FEU
EAG (OHS): 105 MG/DL (ref 68–131)
ERYTHROCYTE [DISTWIDTH] IN BLOOD BY AUTOMATED COUNT: 12.9 % (ref 11.5–14.5)
GFR SERPLBLD CREATININE-BSD FMLA CKD-EPI: >60 ML/MIN/1.73/M2
GLUCOSE SERPL-MCNC: 97 MG/DL (ref 70–110)
HBA1C MFR BLD: 5.3 % (ref 4–5.6)
HCT VFR BLD AUTO: 40.8 % (ref 40–54)
HDLC SERPL-MCNC: 75 MG/DL (ref 40–75)
HDLC SERPL: 45.5 % (ref 20–50)
HGB BLD-MCNC: 13.9 GM/DL (ref 14–18)
IMM GRANULOCYTES # BLD AUTO: 0.02 K/UL (ref 0–0.04)
IMM GRANULOCYTES NFR BLD AUTO: 0.3 % (ref 0–0.5)
LDLC SERPL CALC-MCNC: 76.8 MG/DL (ref 63–159)
LYMPHOCYTES # BLD AUTO: 0.99 K/UL (ref 1–4.8)
MCH RBC QN AUTO: 33.6 PG (ref 27–31)
MCHC RBC AUTO-ENTMCNC: 34.1 G/DL (ref 32–36)
MCV RBC AUTO: 99 FL (ref 82–98)
NONHDLC SERPL-MCNC: 90 MG/DL
NUCLEATED RBC (/100WBC) (OHS): 0 /100 WBC
PLATELET # BLD AUTO: 180 K/UL (ref 150–450)
PMV BLD AUTO: 10.1 FL (ref 9.2–12.9)
POTASSIUM SERPL-SCNC: 4.6 MMOL/L (ref 3.5–5.1)
PROT SERPL-MCNC: 7.5 GM/DL (ref 6–8.4)
PSA SERPL-MCNC: 49.55 NG/ML
RBC # BLD AUTO: 4.14 M/UL (ref 4.6–6.2)
RELATIVE EOSINOPHIL (OHS): 1.4 %
RELATIVE LYMPHOCYTE (OHS): 13 % (ref 18–48)
RELATIVE MONOCYTE (OHS): 10.7 % (ref 4–15)
RELATIVE NEUTROPHIL (OHS): 73.9 % (ref 38–73)
SODIUM SERPL-SCNC: 140 MMOL/L (ref 136–145)
TRIGL SERPL-MCNC: 66 MG/DL (ref 30–150)
TSH SERPL-ACNC: 3.76 UIU/ML (ref 0.4–4)
VIT B12 SERPL-MCNC: 423 PG/ML (ref 210–950)
WBC # BLD AUTO: 7.64 K/UL (ref 3.9–12.7)

## 2025-07-22 PROCEDURE — 86301 IMMUNOASSAY TUMOR CA 19-9: CPT

## 2025-07-22 PROCEDURE — 83036 HEMOGLOBIN GLYCOSYLATED A1C: CPT

## 2025-07-22 PROCEDURE — 84450 TRANSFERASE (AST) (SGOT): CPT

## 2025-07-22 PROCEDURE — 82378 CARCINOEMBRYONIC ANTIGEN: CPT

## 2025-07-22 PROCEDURE — 85379 FIBRIN DEGRADATION QUANT: CPT

## 2025-07-22 PROCEDURE — 82465 ASSAY BLD/SERUM CHOLESTEROL: CPT

## 2025-07-22 PROCEDURE — 86140 C-REACTIVE PROTEIN: CPT

## 2025-07-22 PROCEDURE — 82607 VITAMIN B-12: CPT

## 2025-07-22 PROCEDURE — 85025 COMPLETE CBC W/AUTO DIFF WBC: CPT

## 2025-07-22 PROCEDURE — 82550 ASSAY OF CK (CPK): CPT

## 2025-07-22 PROCEDURE — 36415 COLL VENOUS BLD VENIPUNCTURE: CPT

## 2025-07-22 PROCEDURE — 84443 ASSAY THYROID STIM HORMONE: CPT

## 2025-07-22 PROCEDURE — 84153 ASSAY OF PSA TOTAL: CPT

## 2025-07-22 NOTE — PROGRESS NOTES
Subjective     Patient ID: Av Dorantes is a 79 y.o. male.    Chief Complaint: Follow-up (Discuss results )    He developed shortness of breath with activity in January of 2025.  Last month he had an angioplasty and stent of his mid LAD.  Echo revealed moderate to severe aortic valve stenosis an EF of 45-50%.    He had a CTA today to further evaluate his aortic valve.  There were multiple significant incidental findings on the CTA.  His lungs revealed probable interstitial lung disease, there was a 1.4 cm non malignant appearing pancreatic cyst, he had an enlarged prostate, and they were enlarged mediastinal, retroperitoneal, and iliac lymph nodes.    He continues to have shortness of breath with activity.  He states he gets short of breath after walking 1/2 block.  He also has some dizziness and fatigue.    He has a 60 pack-year history of smoking, however he has not smoked in over 30 years.  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      Hypertension  This is a chronic problem. The current episode started more than 1 year ago. The problem is controlled.             Review of Systems   Constitutional:  Positive for activity change and fatigue.   Respiratory:  Positive for shortness of breath.    Cardiovascular: Negative.  Negative for chest pain.   Neurological:  Positive for dizziness.          Objective     Physical Exam  Constitutional:       Appearance: He is well-developed.   HENT:      Head: Normocephalic and atraumatic.   Eyes:      Pupils: Pupils are equal, round, and reactive to light.   Cardiovascular:      Rate and Rhythm: Normal rate and regular rhythm.      Heart sounds: Murmur heard.      Crescendo decrescendo systolic murmur is present with a grade of 2/6.      Comments: @RUSB  Pulmonary:      Effort: Pulmonary effort is normal.   Neurological:      Mental Status: He is alert.            Assessment and Plan     1. Idiopathic  chronic gout of multiple sites without tophus  Overview:  Right foot      2. High cholesterol  Overview:  LDL: 220      3. Essential hypertension    4. Lymphadenopathy, retroperitoneal    5. Pulmonary fibrosis determined by high resolution computed tomography    6. Pancreatic cyst    7. SOB (shortness of breath) on exertion  -     Complete PFT with bronchodilator; Future    8. Dizzy spells    9. Fatigue, unspecified type    10. Nonrheumatic aortic valve stenosis  Overview:  6/22 - Mild  1/23 - Moderate  increase LA  5/24 - Mod/Severe. EF=45-50%      11. Atherosclerosis of native coronary artery of native heart with stable angina pectoris  Overview:  PTCA mLAD - 6/25      12. Chronic combined systolic and diastolic heart failure        PLAN:  Per orders and D/C instructions.  Continue diet and/or meds for gout, HTN, and high cholesterol, which are stable.  Follow-up with cardiology for CAD, combined systolic and diastolic heart failure, and aortic valve stenosis.  Check labs today to further evaluate lymphadenopathy.  If labs are unrevealing we will consider doing a PET scan.  I have ordered PFTs to evaluate for interstitial lung disease.  I discussed with him in depth the meaning and significance of each of the abnormal findings from his CT scan from yesterday.    Between 40-54 minutes of total time for evaluation and management services were spent on the patient today.  The medical problems and treatment options were discussed, and all questions were answered.           Follow up in 8 weeks (on 9/16/2025).

## 2025-07-23 ENCOUNTER — TELEPHONE (OUTPATIENT)
Dept: UROLOGY | Facility: CLINIC | Age: 80
End: 2025-07-23
Payer: MEDICARE

## 2025-07-23 NOTE — TELEPHONE ENCOUNTER
----- Message from Juarez Perez MD sent at 7/23/2025 12:40 PM CDT -----  Regarding: RE: Prostate Ca  Bryan,    I am out for the next 10 days.      Meghan and Amena, please see him and schedule a standard prostate biopsy in the office on the afternoon of 8/4.  Please see if we can get a PSMA PET prior to the biopsy.  Please use prostate cancer as the diagnosis even though we don't have the path yet    Thanks!!!    Sc  ----- Message -----  From: KAVITHA Larry MD  Sent: 7/23/2025  12:05 PM CDT  To: Juarez Perez MD  Subject: Prostate Ca                                      NYLA,  I just did a referral for you to see him.  He had a CT scan which showed enlarged retroperitoneal, iliac, and mediastinal lymph nodes.  His PSA is 50.

## 2025-07-23 NOTE — TELEPHONE ENCOUNTER
Appointment scheduled.    ----- Message from Juarez Perez MD sent at 7/23/2025 12:40 PM CDT -----  Regarding: RE: Prostate Ca  Bryan,    I am out for the next 10 days.      Meghan and Amena, please see him and schedule a standard prostate biopsy in the office on the afternoon of 8/4.  Please see if we can get a PSMA PET prior to the biopsy.  Please use prostate cancer as the diagnosis even though we don't have the path yet    Thanks!!!    Sc  ----- Message -----  From: KAVITHA Larry MD  Sent: 7/23/2025  12:05 PM CDT  To: Juarez Perez MD  Subject: Prostate Ca                                      NYLA,  I just did a referral for you to see him.  He had a CT scan which showed enlarged retroperitoneal, iliac, and mediastinal lymph nodes.  His PSA is 50.

## 2025-07-24 LAB — NT-PROBNP SERPL IA-MCNC: 1564 PG/ML

## 2025-07-25 ENCOUNTER — OFFICE VISIT (OUTPATIENT)
Dept: UROLOGY | Facility: CLINIC | Age: 80
End: 2025-07-25
Attending: INTERNAL MEDICINE
Payer: MEDICARE

## 2025-07-25 VITALS
RESPIRATION RATE: 12 BRPM | OXYGEN SATURATION: 98 % | SYSTOLIC BLOOD PRESSURE: 133 MMHG | HEIGHT: 73 IN | DIASTOLIC BLOOD PRESSURE: 68 MMHG | HEART RATE: 69 BPM | BODY MASS INDEX: 28.63 KG/M2 | WEIGHT: 216.06 LBS

## 2025-07-25 DIAGNOSIS — R63.4 WEIGHT LOSS: ICD-10-CM

## 2025-07-25 DIAGNOSIS — R59.0 PELVIC LYMPHADENOPATHY: ICD-10-CM

## 2025-07-25 DIAGNOSIS — R35.0 URINARY FREQUENCY: ICD-10-CM

## 2025-07-25 DIAGNOSIS — C61 PROSTATE CANCER: Primary | ICD-10-CM

## 2025-07-25 PROCEDURE — 87086 URINE CULTURE/COLONY COUNT: CPT | Performed by: NURSE PRACTITIONER

## 2025-07-25 RX ORDER — CIPROFLOXACIN 500 MG/1
500 TABLET, FILM COATED ORAL ONCE
Qty: 1 TABLET | Refills: 0 | Status: SHIPPED | OUTPATIENT
Start: 2025-07-25 | End: 2025-07-25

## 2025-07-25 NOTE — PROGRESS NOTES
"Subjective:      Av Dorantes is a 79 y.o. male who was referred by Dr. Larry for evaluation of his elevated PSA.      The patient recently completed a cardiac CTA that demonstrated "Prostatomegaly. Pelvic retroperitoneal, and mediastinal lymphadenopathy concerning for metastases.  A reactive or inflammatory process is less likely.  Consider PET/CT and/or biopsy." PSA was subsequently ordered.     Elevated PSA  Patient is here with an elevated PSA. He has no personal history and no family history of prostate cancer. He denies bothersome LUTS. He has no prior genitourinary history of hematuria, hematospermia, prostatitis, UTI, erectile dysfunction, urolithiasis, epididymal orchitis, previous  surgery.  He reports a 30 # weight loss over the last year. Reports lower back pain- treated for sciatica with PT- improved.   Previous PSA values are :  Lab Results   Component Value Date    PSA 49.55 (H) 07/22/2025    PSA 2.3 09/10/2019    PSA 1.5 09/04/2018     The following portions of the patient's history were reviewed and updated as appropriate: allergies, current medications, past family history, past medical history, past social history, past surgical history and problem list.    Review of Systems  Constitutional: no fever or chills  ENT: no nasal congestion or sore throat  Respiratory: no cough or shortness of breath  Cardiovascular: no chest pain or palpitations  Gastrointestinal: no nausea or vomiting, tolerating diet  Genitourinary: as per HPI  Hematologic/Lymphatic: no easy bruising or lymphadenopathy  Musculoskeletal: no arthralgias or myalgias  Neurological: no seizures or tremors  Behavioral/Psych: no auditory or visual hallucinations     Objective:   Vitals:   Vitals:    07/25/25 0820   BP: 133/68   Pulse: 69   Resp: 12     Physical Exam   General: alert and oriented, no acute distress  Head: normocephalic, atraumatic  Neck: supple, normal ROM  Respiratory: Symmetric expansion, non-labored " breathing  Cardiovascular: regular rate and rhythm  Abdomen: soft, non tender, non distended  Genitourinary: deferred  Skin: normal coloration and turgor, no rashes, no suspicious skin lesions noted  Neuro: alert and oriented x3, no gross deficits  Psych: normal judgment and insight, normal mood/affect, and non-anxious    Lab Review   Urinalysis demonstrates : trace leukocytes  Lab Results   Component Value Date    WBC 7.64 07/22/2025    HGB 13.9 (L) 07/22/2025    HGB 13.7 (L) 09/17/2024    HCT 40.8 07/22/2025    HCT 41.3 09/17/2024    MCV 99 (H) 07/22/2025     (H) 09/17/2024     07/22/2025     (L) 09/17/2024     Lab Results   Component Value Date    CREATININE 1.0 07/22/2025    BUN 18 07/22/2025     Lab Results   Component Value Date    PSA 49.55 (H) 07/22/2025    PSA 2.3 09/10/2019     Imaging   None    Assessment:     1. Prostate cancer    2. Urinary frequency    3. Pelvic lymphadenopathy    4. Weight loss      Plan:   Diagnoses and all orders for this visit:    Prostate cancer  -     ciprofloxacin HCl (CIPRO) 500 MG tablet; Take 1 tablet (500 mg total) by mouth once. for 1 dose  -     Transrectal Ultrasound w/ Biopsy; Future  -     NM PET CT F 18 PYL PSMA, Midthigh to Vertex; Future  -     Urine Culture High Risk ($$)  -     Ambulatory referral/consult to Urology    Urinary frequency  -     Urine Culture High Risk ($$)    Pelvic lymphadenopathy    Weight loss    Plan:  --Reviewed imaging and lab results- concerning for metastatic prostate cancer  --Will proceed with TRUS/bx in office with Dr. Perez on 8/4  --PSMA PET prior   --Urine culture   --Reviewed biopsy instruction sheet   --Urgent message sent to Dr. Ho for plavix hold

## 2025-07-25 NOTE — PATIENT INSTRUCTIONS
Instructions:    Blood Thinners:  Stop taking all blood thinners 7 days prior to the procedure if okay with your doctor. These include prescription drugs (Xarelto, Coumadin, Eliquis, Plavix, Pradaxa, Brilinta) and over the counter supplements and medications (full-dose aspirin, fish oil, multivitamins, vitamin E, ibuprofen/advil, aleve, Naproxen). Baby aspirin (81 mg) is okay to take. If you are unsure if a medication is a blood thinner, please let us know.    Antibiotics: You need to take an antibiotic tablet before the procedure to reduce the risk of infection. Your doctor has called in an antibiotic to your local pharmacy. Instructions:  Morning of Biopsy, 1-2 hours before the biopsy: Take one tablet    Fleets Enema: You will need to use 2 fleets enemas to clean out your bowels. Do the fleets enemas the morning of the procedure. You can buy this at any pharmacy.    Diet/Fasting: You do not need to fast. Please have a light breakfast.    Follow-Up:   Biopsy results take about a week to come back. You will be scheduled for an appointment to discuss the results with your doctor.

## 2025-07-26 LAB — BACTERIA UR CULT: ABNORMAL

## 2025-07-28 ENCOUNTER — TELEPHONE (OUTPATIENT)
Dept: UROLOGY | Facility: CLINIC | Age: 80
End: 2025-07-28
Payer: MEDICARE

## 2025-07-28 DIAGNOSIS — N30.00 ACUTE CYSTITIS WITHOUT HEMATURIA: Primary | ICD-10-CM

## 2025-07-28 RX ORDER — CIPROFLOXACIN 500 MG/1
500 TABLET, FILM COATED ORAL 2 TIMES DAILY
Qty: 14 TABLET | Refills: 0 | Status: SHIPPED | OUTPATIENT
Start: 2025-07-28 | End: 2025-08-04

## 2025-07-28 NOTE — TELEPHONE ENCOUNTER
Discussed with Dr. Ho by phone- unable to hold plavix given very recent stent placement. Will check with Dr. Perez if possible to proceed with biopsy on plavix. Hold on biopsy scheduling for now. Proceed with PSMA PET as scheduled.     ----- Message from Tyson Ho MD sent at 7/27/2025  2:15 PM CDT -----  Regarding: RE: plavix  Mr. Dorantes has a fresh stent in his proximal left anterior descending coronary artery.  Holding antiplatelet therapy (aspirin or Plavix) would be associated with a high risk of acute stent thrombosis.    Please give me a call to discuss options.    [facility]  [phone]  ----- Message -----  From: Amena Ortiz NP  Sent: 7/25/2025   9:00 AM CDT  To: Tyson Ho III, MD  Subject: plavix                                           Hi Dr. Ho- We are trying to schedule Mr. Dorantes for urgent prostate biopsy for his suspected metastatic prostate cancer. Would it be okay for him to hold his plavix starting 7/28 for biopsy scheduled 8/4?   Thanks  Amena Ortiz

## 2025-07-28 NOTE — TELEPHONE ENCOUNTER
Staff called pt to schedule appt. Pt did not answer, staff left  instructing call back   
(3) walks occasionally

## 2025-07-28 NOTE — TELEPHONE ENCOUNTER
Pt returned staff's phone call. Pt was informed that appt has been schedule for urine test. Pt informed staff that he had tested positive for COVID and wants to pass the word on to EKATERINA Beckwith. Staff confirmed and pt voiced understanding.

## 2025-07-29 ENCOUNTER — TELEPHONE (OUTPATIENT)
Dept: CARDIOLOGY | Facility: CLINIC | Age: 80
End: 2025-07-29
Payer: MEDICARE

## 2025-07-29 NOTE — TELEPHONE ENCOUNTER
TAVR procedure to be delayed at this time.  Pt has other health issues with emergent needs and require additional testing.  TAVR case removed from surgery snapboard.

## 2025-08-01 ENCOUNTER — TELEPHONE (OUTPATIENT)
Dept: UROLOGY | Facility: CLINIC | Age: 80
End: 2025-08-01
Payer: MEDICARE

## 2025-08-01 NOTE — TELEPHONE ENCOUNTER
Patient had COVID this week. He will call on Monday to report how he is feeling. PET scheduled for Tuesday.

## 2025-08-04 DIAGNOSIS — I35.0 NONRHEUMATIC AORTIC (VALVE) STENOSIS: Primary | ICD-10-CM

## 2025-08-05 ENCOUNTER — TUMOR BOARD CONFERENCE (OUTPATIENT)
Dept: UROLOGY | Facility: HOSPITAL | Age: 80
End: 2025-08-05
Payer: OTHER GOVERNMENT

## 2025-08-05 ENCOUNTER — TELEPHONE (OUTPATIENT)
Dept: CARDIOLOGY | Facility: CLINIC | Age: 80
End: 2025-08-05
Payer: OTHER GOVERNMENT

## 2025-08-05 ENCOUNTER — HOSPITAL ENCOUNTER (OUTPATIENT)
Dept: RADIOLOGY | Facility: HOSPITAL | Age: 80
Discharge: HOME | End: 2025-08-05
Attending: NURSE PRACTITIONER
Payer: OTHER GOVERNMENT

## 2025-08-05 DIAGNOSIS — C61 PROSTATE CANCER: ICD-10-CM

## 2025-08-05 PROCEDURE — 78815 PET IMAGE W/CT SKULL-THIGH: CPT | Mod: 26,PI,, | Performed by: NUCLEAR MEDICINE

## 2025-08-05 PROCEDURE — 78815 PET IMAGE W/CT SKULL-THIGH: CPT | Mod: TC

## 2025-08-05 PROCEDURE — A9595 HC PIFLUFOLASTAT F-18, DX, PER 1 MCI: HCPCS | Mod: TB | Performed by: NURSE PRACTITIONER

## 2025-08-05 RX ADMIN — PIFLUFOLASTAT F-18 9.6 MILLICURIE: 80 INJECTION INTRAVENOUS at 12:08

## 2025-08-05 NOTE — TELEPHONE ENCOUNTER
Valve CTA TAVR Sent for valve measurements.  At this time, case is delayed and on hold until patient gets further work up for rule out prostate cancer.

## 2025-08-05 NOTE — PROGRESS NOTES
[facility]      GENITOURINARY MULTIDISCIPLINARY TUMOR BOARD  PATIENT REVIEW FORM     CLINIC #: [id number]  DATE: 08/05/2025    TUMOR SITE:   prostate    ATTENDING:   Juarez Perez MD     PATIENT SUMMARY:   Av Dorantes is a 79 y.o. male with a history of CAD s/p PCI 06/2025, PVCs, HF, aortic stenosis planned for TAVR 08/06/2025 who was recently found to have pelvic, retroperitoneal, and mediastinal lymphadenopathy on CTA. Subsequent PSA was 49.55. He was scheduled for prostate biopsy with Dr. Perez however was unable to hold Plavix for prostate biopsy due to high risk of cardiac stent thrombosis. He has a PSMA PET scheduled.     DISCUSSION:  Would it be ok to perform a biopsy while the patient is actively on plavix? Can we treat him empirically without tissue diagnosis?    PERFORMANCE STATUS:  ECOG 1    Estimated GFR/CKD Stage: > 60 (Cr 1)    Clinical/Pathologic Stage (TNM): N/A, no tissue diagnosis or staging     FACULTY IN ATTENDANCE:    Urologic Oncology: Terry Martin MD; Juarze Perez MD; Miguel Barnett MD, Jack Posadas MD    Radiation Oncology: Damien Rios MD    Hematology/Oncology: Tresa Ramon MD; Zaire Florentino MD    Pathology: Raymon Cordoba MD    CONSULT NEEDED:     [x] Urologic Oncology    [x] Hem/Onc    [] Rad/Onc   []     [] Physical/Occupational Therapy    [] Psychology  [] Other: ___________________    [x] Treatment Guidelines (NCCN and AUA) reviewed and care planned is consistent with guidelines.    PRESENTATION AT CANCER CONFERENCE:         [] Prospective    [] Retrospective     [] Follow-Up          [] Eligible for clinical trial    TUMOR BOARD RECOMMENDATIONS/PLAN/CONSENSUS:     Case discussed among group. Pathology and radiologic images were reviewed (if applicable).    Recommend not doing biopsy while on Plavix.  Will see what his PSMA pet scan shows before deciding on a path forward.   If he has a PIRADs-5 lesion or PSMA pet scan is positive, would  likely be able to give ADT without tissue diagnosis.

## 2025-08-06 ENCOUNTER — TELEPHONE (OUTPATIENT)
Dept: HEMATOLOGY/ONCOLOGY | Facility: CLINIC | Age: 80
End: 2025-08-06
Payer: OTHER GOVERNMENT

## 2025-08-06 ENCOUNTER — DOCUMENTATION ONLY (OUTPATIENT)
Dept: INTERNAL MEDICINE | Facility: CLINIC | Age: 80
End: 2025-08-06
Payer: OTHER GOVERNMENT

## 2025-08-06 ENCOUNTER — HOSPITAL ENCOUNTER (OUTPATIENT)
Dept: RADIOLOGY | Facility: OTHER | Age: 80
Discharge: HOME | End: 2025-08-06
Attending: INTERNAL MEDICINE
Payer: OTHER GOVERNMENT

## 2025-08-06 ENCOUNTER — TELEPHONE (OUTPATIENT)
Dept: UROLOGY | Facility: CLINIC | Age: 80
End: 2025-08-06
Payer: OTHER GOVERNMENT

## 2025-08-06 DIAGNOSIS — Z78.9 KNOWN MEDICAL PROBLEMS: ICD-10-CM

## 2025-08-06 DIAGNOSIS — I10 ESSENTIAL HYPERTENSION: Primary | ICD-10-CM

## 2025-08-06 DIAGNOSIS — C61 PROSTATE CANCER: ICD-10-CM

## 2025-08-06 DIAGNOSIS — C61 PROSTATE CANCER: Primary | ICD-10-CM

## 2025-08-06 PROBLEM — J43.1 PANLOBULAR EMPHYSEMA: Status: ACTIVE | Noted: 2025-08-06

## 2025-08-06 PROCEDURE — 25500020 PHARM REV CODE 255: Performed by: INTERNAL MEDICINE

## 2025-08-06 PROCEDURE — 71275 CT ANGIOGRAPHY CHEST: CPT | Mod: 26,,, | Performed by: RADIOLOGY

## 2025-08-06 PROCEDURE — 71275 CT ANGIOGRAPHY CHEST: CPT | Mod: TC

## 2025-08-06 RX ADMIN — IOHEXOL 75 ML: 350 INJECTION, SOLUTION INTRAVENOUS at 11:08

## 2025-08-06 NOTE — TELEPHONE ENCOUNTER
----- Message from Juarez Perez MD sent at 8/6/2025 11:10 AM CDT -----  Meghan    As we discussed in tumor board, can we have Mr Dorantes see heme onc asap please?  Thanks    Sc  ----- Message -----  From: Amena Ortiz NP  Sent: 8/6/2025   6:39 AM CDT  To: Juarez Perez MD

## 2025-08-06 NOTE — PROGRESS NOTES
Advanced primary care management (APCM) billing requirements have been met for this month.  Written consent was obtained and scanned in the electronic medical record.    My office is prepared and capable of delivering all the following APCM service elements to this Medicare beneficiary:     The patient is aware that only one practitioner can furnish and be paid for the service during a calendar month; that they have the right to stop the services at any time; and that cost sharing may apply.  Provide 24/7 access to care team/practitioner for urgent needs.  Provide continuity of care with the PCP, whom the patient can schedule successive routine appointments.  Deliver care in alternative ways to traditional office visits to best meet the patient's needs, such as Telemedecine visits.  There is overall comprehensive care management, systematic needs assessment (medical and psychosocial), and system-based approaches to ensure receipt of preventive services.    Perform medication management, reconciliation, and oversight of self-management.  Ensure timely follow-up communication with the patient and/or caregiver after an emergency department visit, discharge from hospital, skilled nursing facility, or other health care facility, within 7 calendar days of discharge.  Provide coordination of care transitions between and among health care facilities.  Ensure timely exchange of electronic health information with other practitioners and providers to support continuity of care.  Provide ongoing communication and coordination with other practitioners and other health care facilities, and document communication regarding the patient's psychosocial needs, functional deficits, goals, preferences, and desired outcomes, including cultural and linguistic factors, in the patient's medical record.  Provide enhanced opportunities for the patient or caregiver to communicate with the care team regarding the patient's care using  non-face-to-face consultation methods, such as secure messaging, patient portal, or other patient-initiated digital communications that require a clinical decision.  Analyze patient population data to identify gaps in care and offer interventions.  Risk stratify the practice population based on defined diagnoses, claims, or other electronic data to identify and target services to patients.  Be assessed through performance measurement of primary care quality, total cost of care, and meaningful use of Certified EHR Technology.  Development, implementation, revision, and maintenance of an electronic patient-centered comprehensive care plan.  The patient centered comprehensive care plan is listed under Patient Instructions for the 1st visit (or occasionally the 2nd visit) with me each calendar year. A copy is printed for the patient and it can be accessed at any time through the patient portal.  A typical comprehensive care plan includes the following recommendations:  1. Exercise  Exercise aerobically with a target heart rate of (220-age) x 0.8  Exercise 30-45 minutes on most days of the week  2. Diet and Supplements- All supplements can be obtained through a varied, healthy diet  Calcium: 1,000 - 1,200 mg each day  8 oz milk or Calcium fortified O.J. = 300, 8 oz Yogurt = 400 mg, 1 oz of cheese =100-200 mg              8 oz Oatmeal = 215 mg, 3 oz [place] = 240 mg  Vitamin D: 800 iu each day- Can probably be obtained by 30 min. of direct sunlight    each day             3 oz. [place] = 800 iu,  3 oz. Tuna =150 iu, Milk or fortified O.J. = 120 iu  Fish oil: 1-2 grams each day or about 840 mg of EPA and DHA (Omega-3 fatty acids) each day             3 oz. [place]=2 grams,  3 oz. Tuna=1.3 grams,  3 oz. drained light Tuna= 0.25 grams  Folic acid 800 mcg each day for all women planning or capable of pregnancy  3. Lifestyle  Alcohol: 1 drink = 12 oz. domestic beer, 4 oz. wine, or 1 oz. hard (80 proof) liquor             Males:  </= 14 drinks per week with no more than 4 in any one day             Females: </= 7 drinks per week with no more than 3 in any one day  Salt: 1.2 - 3 grams of Sodium each day.  Tobacco: Don't smoke, or quit smoking (discuss with your doctor)  Depression: If you feel depressed discuss with your doctor  Weight: Maintain a healthy body weight. Stay within 10% of:             Males: 106 lbs. + 6 lbs per inch height above 5 feet             Females: 100 lbs + 5 lbs per inch height above 5 feet  4. Routine tests  Blood pressure check at each visit, or at least once each year  HIV screening (one time) if less than 65 years old  Hepatitis C screen (one time) if less than 80 years old  Cholesterol screening every 3 years starting at age 21  Glucose/Hemaglobin A1C check every 3 years starting at age 35.  TSH (thyroid screen) every 2 years starting at age 50  Colonoscopy at age 45, and repeat every 10 years until age 75. Consider screening until age 85. DNA stool test (Cologuard) every 3 years is an acceptable alternative.  Vision screen at age 65  Females:  Gyn exam with cervical HPV test every 3 years or Pap smear every three years starting at age 25                  Stop screening at age 65 if past 3 exams were normal                  No screening for women who have had a hysterectomy with removal of cervix  Mammogram every 1-2 years starting at age 40 until age 75  Consider continuing Mammograms every other year for those older than 75 with a life expectancy of more than 10 years  Bone density scan at about age 65  Males:  PSA screening annually at age 50, age 45 for  Americans, until age 75. Consider annual screening after age 75          5. Immunizations  Influenza vaccine every year in the fall, especially if >50 or with a chronic disease  Consider getting a Covid booster vaccine annually  Tetanus/Diphtheria/Pertussis (Tdap) vaccine once (after the age of 18), then Tetanus/Diphtheria (Td) or Tdap vaccine every  10 years  Shingles (Shingrix) vaccine after age 50 and get a 2nd dose after 2-6 months  RSV (respiratory syncytial virus) vaccine after age 60  Pneumonia vaccine (Prevnar-20) at age 65    6. Advanced Directive/End of life care planning  You should consider having a signed document which informs physicians and family of your end of life care wishes.  You can go to [employer]/DNR/[place]. Under Step 1 click download AdobePDF and print.  This is the [place] physician order for scope of Treatment ([employer]) form.  You can also request a blank copy of the [employer] form from my office.  Bring a copy of the signed document to my office so we can scan it in your medical chart.

## 2025-08-06 NOTE — NURSING
Nurse navigator spoke with patient to coordinate appt with Dr. Florentino on 8/13/25.  Patient states understanding.  All questions answered and contact info given for any future questions.  Oncology Navigation   Intake  Cancer Type:   Type of Referral: Internal  Multiple appointments: No     Treatment     Surgical Oncologist: Juarez Perez    Medical Oncologist: Zaire Florentino                       Acuity      Follow Up  No follow-ups on file.

## 2025-08-11 ENCOUNTER — LAB VISIT (OUTPATIENT)
Dept: LAB | Facility: OTHER | Age: 80
End: 2025-08-11
Attending: NURSE PRACTITIONER
Payer: MEDICARE

## 2025-08-11 DIAGNOSIS — N30.00 ACUTE CYSTITIS WITHOUT HEMATURIA: ICD-10-CM

## 2025-08-11 PROCEDURE — 87086 URINE CULTURE/COLONY COUNT: CPT

## 2025-08-13 ENCOUNTER — OFFICE VISIT (OUTPATIENT)
Dept: HEMATOLOGY/ONCOLOGY | Facility: CLINIC | Age: 80
End: 2025-08-13
Payer: MEDICARE

## 2025-08-13 ENCOUNTER — LAB VISIT (OUTPATIENT)
Dept: LAB | Facility: HOSPITAL | Age: 80
End: 2025-08-13
Attending: INTERNAL MEDICINE
Payer: MEDICARE

## 2025-08-13 VITALS
RESPIRATION RATE: 18 BRPM | BODY MASS INDEX: 28.75 KG/M2 | HEIGHT: 73 IN | TEMPERATURE: 98 F | WEIGHT: 216.94 LBS | DIASTOLIC BLOOD PRESSURE: 62 MMHG | HEART RATE: 73 BPM | SYSTOLIC BLOOD PRESSURE: 111 MMHG | OXYGEN SATURATION: 98 %

## 2025-08-13 DIAGNOSIS — Z79.818 ENCOUNTER FOR MONITORING ANDROGEN DEPRIVATION THERAPY: ICD-10-CM

## 2025-08-13 DIAGNOSIS — K86.2 PANCREATIC CYST: ICD-10-CM

## 2025-08-13 DIAGNOSIS — Z79.899 LONG TERM CURRENT USE OF THERAPEUTIC DRUG: ICD-10-CM

## 2025-08-13 DIAGNOSIS — C61 PROSTATE CANCER: ICD-10-CM

## 2025-08-13 DIAGNOSIS — C61 PROSTATE CANCER: Primary | ICD-10-CM

## 2025-08-13 DIAGNOSIS — C77.8 MALIGNANT NEOPLASM METASTATIC TO LYMPH NODES OF MULTIPLE SITES: ICD-10-CM

## 2025-08-13 PROBLEM — C77.9 METASTASIS TO LYMPH NODES: Status: ACTIVE | Noted: 2025-08-13

## 2025-08-13 LAB
25(OH)D3+25(OH)D2 SERPL-MCNC: 37 NG/ML (ref 30–96)
ABSOLUTE NEUTROPHIL COUNT (OHS): 6.12 K/UL (ref 1.8–7.7)
ALBUMIN SERPL BCP-MCNC: 3.8 G/DL (ref 3.5–5.2)
ALP SERPL-CCNC: 59 UNIT/L (ref 40–150)
ALT SERPL W/O P-5'-P-CCNC: 19 UNIT/L (ref 0–55)
ANION GAP (OHS): 9 MMOL/L (ref 8–16)
AST SERPL-CCNC: 27 UNIT/L (ref 0–50)
BACTERIA UR CULT: NO GROWTH
BILIRUB SERPL-MCNC: 0.8 MG/DL (ref 0.1–1)
BUN SERPL-MCNC: 18 MG/DL (ref 8–23)
CALCIUM SERPL-MCNC: 9.3 MG/DL (ref 8.7–10.5)
CHLORIDE SERPL-SCNC: 108 MMOL/L (ref 95–110)
CO2 SERPL-SCNC: 23 MMOL/L (ref 23–29)
CREAT SERPL-MCNC: 0.9 MG/DL (ref 0.5–1.4)
ERYTHROCYTE [DISTWIDTH] IN BLOOD BY AUTOMATED COUNT: 12.4 % (ref 11.5–14.5)
GFR SERPLBLD CREATININE-BSD FMLA CKD-EPI: >60 ML/MIN/1.73/M2
GLUCOSE SERPL-MCNC: 89 MG/DL (ref 70–110)
HCT VFR BLD AUTO: 42.6 % (ref 40–54)
HGB BLD-MCNC: 13.8 GM/DL (ref 14–18)
IMM GRANULOCYTES # BLD AUTO: 0.03 K/UL (ref 0–0.04)
MCH RBC QN AUTO: 33.3 PG (ref 27–31)
MCHC RBC AUTO-ENTMCNC: 32.4 G/DL (ref 32–36)
MCV RBC AUTO: 103 FL (ref 82–98)
PLATELET # BLD AUTO: 151 K/UL (ref 150–450)
PMV BLD AUTO: 10.1 FL (ref 9.2–12.9)
POTASSIUM SERPL-SCNC: 4.5 MMOL/L (ref 3.5–5.1)
PROT SERPL-MCNC: 6.9 GM/DL (ref 6–8.4)
PSA SERPL-MCNC: 55.73 NG/ML
RBC # BLD AUTO: 4.15 M/UL (ref 4.6–6.2)
SODIUM SERPL-SCNC: 140 MMOL/L (ref 136–145)
TESTOST SERPL-MCNC: 541 NG/DL (ref 304–1227)
WBC # BLD AUTO: 8.08 K/UL (ref 3.9–12.7)

## 2025-08-13 PROCEDURE — 99417 PROLNG OP E/M EACH 15 MIN: CPT | Mod: S$PBB,,, | Performed by: HOSPITALIST

## 2025-08-13 PROCEDURE — 36415 COLL VENOUS BLD VENIPUNCTURE: CPT

## 2025-08-13 PROCEDURE — 84153 ASSAY OF PSA TOTAL: CPT

## 2025-08-13 PROCEDURE — 82040 ASSAY OF SERUM ALBUMIN: CPT

## 2025-08-13 PROCEDURE — G2211 COMPLEX E/M VISIT ADD ON: HCPCS | Mod: ,,, | Performed by: HOSPITALIST

## 2025-08-13 PROCEDURE — 99999 PR PBB SHADOW E&M-EST. PATIENT-LVL V: CPT | Mod: PBBFAC,,, | Performed by: HOSPITALIST

## 2025-08-13 PROCEDURE — 99215 OFFICE O/P EST HI 40 MIN: CPT | Mod: PBBFAC | Performed by: HOSPITALIST

## 2025-08-13 PROCEDURE — 84403 ASSAY OF TOTAL TESTOSTERONE: CPT

## 2025-08-13 PROCEDURE — 99205 OFFICE O/P NEW HI 60 MIN: CPT | Mod: S$PBB,,, | Performed by: HOSPITALIST

## 2025-08-13 PROCEDURE — 82306 VITAMIN D 25 HYDROXY: CPT

## 2025-08-13 PROCEDURE — 85027 COMPLETE CBC AUTOMATED: CPT

## 2025-08-13 RX ORDER — BICALUTAMIDE 50 MG/1
50 TABLET, FILM COATED ORAL DAILY
Qty: 30 TABLET | Refills: 0 | Status: SHIPPED | OUTPATIENT
Start: 2025-08-13 | End: 2026-08-13

## 2025-08-18 LAB
GENE DIS ANL INTERP-IMP: NORMAL
TEMPUS GENES ANALYZED: NORMAL
TEMPUS INTERPRETATION REPORT: NORMAL
TEMPUS PORTAL: NORMAL

## 2025-08-19 LAB
DNA RANGE(S) EXAMINED NAR: NORMAL
GENE DIS ANL INTERP-IMP: NORMAL
GENE DIS ASSESSED: NORMAL
REASON FOR STUDY: NORMAL
TEMPUS BLOOD TUMOR MUTATIONAL BURDEN NOTE: NORMAL
TEMPUS GENOMIC NOTE: NORMAL
TEMPUS LCA: NORMAL
TEMPUS MSI NOTE: NORMAL
TEMPUS PORTAL: NORMAL
TEMPUS TREATMENT IMPLICATIONS NOTE: NORMAL

## 2025-08-21 ENCOUNTER — INFUSION (OUTPATIENT)
Dept: INFUSION THERAPY | Facility: HOSPITAL | Age: 80
End: 2025-08-21
Attending: INTERNAL MEDICINE
Payer: MEDICARE

## 2025-08-21 ENCOUNTER — OFFICE VISIT (OUTPATIENT)
Dept: HEMATOLOGY/ONCOLOGY | Facility: CLINIC | Age: 80
End: 2025-08-21
Payer: MEDICARE

## 2025-08-21 VITALS
TEMPERATURE: 98 F | RESPIRATION RATE: 18 BRPM | SYSTOLIC BLOOD PRESSURE: 121 MMHG | WEIGHT: 214 LBS | DIASTOLIC BLOOD PRESSURE: 60 MMHG | HEART RATE: 73 BPM | BODY MASS INDEX: 28.36 KG/M2 | OXYGEN SATURATION: 90 % | HEIGHT: 73 IN

## 2025-08-21 DIAGNOSIS — Z79.899 LONG TERM CURRENT USE OF THERAPEUTIC DRUG: ICD-10-CM

## 2025-08-21 DIAGNOSIS — Z79.818 ENCOUNTER FOR MONITORING ANDROGEN DEPRIVATION THERAPY: ICD-10-CM

## 2025-08-21 DIAGNOSIS — C77.5 MALIGNANT NEOPLASM METASTATIC TO INTRAPELVIC LYMPH NODE: ICD-10-CM

## 2025-08-21 DIAGNOSIS — C61 PROSTATE CANCER: Primary | ICD-10-CM

## 2025-08-21 DIAGNOSIS — K86.2 PANCREATIC CYST: ICD-10-CM

## 2025-08-21 PROCEDURE — 99999 PR PBB SHADOW E&M-EST. PATIENT-LVL III: CPT | Mod: PBBFAC,,, | Performed by: NURSE PRACTITIONER

## 2025-08-21 PROCEDURE — 99215 OFFICE O/P EST HI 40 MIN: CPT | Mod: S$PBB,,, | Performed by: NURSE PRACTITIONER

## 2025-08-21 PROCEDURE — G2211 COMPLEX E/M VISIT ADD ON: HCPCS | Mod: ,,, | Performed by: NURSE PRACTITIONER

## 2025-08-21 PROCEDURE — 99213 OFFICE O/P EST LOW 20 MIN: CPT | Mod: PBBFAC,25 | Performed by: NURSE PRACTITIONER

## 2025-08-21 PROCEDURE — 96402 CHEMO HORMON ANTINEOPL SQ/IM: CPT

## 2025-08-21 PROCEDURE — 63600175 PHARM REV CODE 636 W HCPCS: Mod: JZ,TB | Performed by: NURSE PRACTITIONER

## 2025-08-21 RX ADMIN — LEUPROLIDE ACETATE 22.5 MG: KIT at 01:08

## (undated) DEVICE — CATH DXTERITY JL40 100CM 6FR

## (undated) DEVICE — CATH INFINITI 4F 3DRC 100CM

## (undated) DEVICE — LUBRICANT VIPERSLIDE 100ML BAG

## (undated) DEVICE — GUIDEWIRE ROTAWIRE 330X0.014CM

## (undated) DEVICE — DRESSING TEGADERM IV 3.5 X 4.5

## (undated) DEVICE — KIT COPILOT VALVE HEMO TOOL

## (undated) DEVICE — WIRE WHISPER MS 014 X 190

## (undated) DEVICE — PAD DEFIB CADENCE ADULT R2

## (undated) DEVICE — TRANSDUCER ADULT DISP

## (undated) DEVICE — OMNIPAQUE CONTRAST 350MG/100ML

## (undated) DEVICE — DRAPE FEMORAL 82 X 124 IN

## (undated) DEVICE — GUIDEWIRE MIRACLEBROS 6 180CM

## (undated) DEVICE — GUIDE WIRE BMW 014 X190

## (undated) DEVICE — DRAPE ANGIO BRACH 38X44IN

## (undated) DEVICE — GUIDEWIRE ANGIO 1.5MM .035X180

## (undated) DEVICE — FLUID DELIVERY SET WITH FILTER

## (undated) DEVICE — TRAY ANGIO BAPTIST

## (undated) DEVICE — BURR ADVANCER ROTAPRO 1.25X135

## (undated) DEVICE — GUIDEWIRE MIRACLEBROS 3 180CM

## (undated) DEVICE — TRAY CATH LAB OMC

## (undated) DEVICE — COVER PROBE US 5.5X58L NON LTX

## (undated) DEVICE — KIT GLIDESHEATH SLEND 6FR 10CM

## (undated) DEVICE — HEMOSTAT VASC BAND REG 24CM

## (undated) DEVICE — SET MICROPUNCTUREECHO STIFF

## (undated) DEVICE — GUIDEWIRE EMERALD 150CM PTFE

## (undated) DEVICE — KIT PROBE COVER WITH GEL

## (undated) DEVICE — CATH EMERGE MR 40X3.0MM

## (undated) DEVICE — INTRODUCER CATH 6F 11CM

## (undated) DEVICE — CATH EMERGE MR 40X2.5MM

## (undated) DEVICE — SUT PERCLOSE PROSTYLE MEDIATE

## (undated) DEVICE — INTRODUCER CATH 4F 11CM

## (undated) DEVICE — PAD RADI FEMORAL

## (undated) DEVICE — KIT CUSTOM MANIFOLD

## (undated) DEVICE — WIRE GUIDE SAFE-T-J .035 260CM

## (undated) DEVICE — BAG SNAP KOVER BAND 36 X 28 IN

## (undated) DEVICE — CATH OPTITORQUE RADIAL 5FR

## (undated) DEVICE — CATH INFINITI 4F JL4 .042X100

## (undated) DEVICE — INFLATOR ENCORE 26 BLLN INFL

## (undated) DEVICE — CATH GUID EBU4 LAUNCH 6FRX100

## (undated) DEVICE — MICROCATHETER MAMBA FLEX 135CM

## (undated) DEVICE — KIT MICROINTRO 4F .018X40X7CM

## (undated) DEVICE — WIRE BHW 014X300

## (undated) DEVICE — SPIKE SHORT LG BORE 1-WAY 2IN